# Patient Record
Sex: MALE | Race: WHITE | NOT HISPANIC OR LATINO | Employment: OTHER | URBAN - METROPOLITAN AREA
[De-identification: names, ages, dates, MRNs, and addresses within clinical notes are randomized per-mention and may not be internally consistent; named-entity substitution may affect disease eponyms.]

---

## 2020-03-05 ENCOUNTER — OFFICE VISIT (OUTPATIENT)
Dept: DERMATOLOGY | Facility: IMAGING CENTER | Age: 75
End: 2020-03-05
Payer: MEDICARE

## 2020-03-05 VITALS — TEMPERATURE: 98.2 F | BODY MASS INDEX: 28.89 KG/M2 | WEIGHT: 218 LBS | HEIGHT: 73 IN

## 2020-03-05 DIAGNOSIS — L57.0 ACTINIC KERATOSES: ICD-10-CM

## 2020-03-05 DIAGNOSIS — L30.9 DERMATITIS: ICD-10-CM

## 2020-03-05 PROCEDURE — 99203 OFFICE O/P NEW LOW 30 MIN: CPT | Performed by: DERMATOLOGY

## 2020-03-05 RX ORDER — HYDROCHLOROTHIAZIDE 25 MG/1
25 TABLET ORAL DAILY
COMMUNITY
End: 2023-10-23

## 2020-03-05 RX ORDER — TRIAMCINOLONE ACETONIDE 1 MG/G
OINTMENT TOPICAL
Qty: 454 G | Refills: 1 | Status: SHIPPED | OUTPATIENT
Start: 2020-03-05 | End: 2021-06-18

## 2020-03-05 RX ORDER — FLUOROURACIL 50 MG/G
CREAM TOPICAL
Qty: 1 TUBE | Refills: 1 | Status: SHIPPED | OUTPATIENT
Start: 2020-03-05 | End: 2021-08-02

## 2020-03-05 RX ORDER — TAMSULOSIN HYDROCHLORIDE 0.4 MG/1
0.4 CAPSULE ORAL 2 TIMES DAILY
COMMUNITY

## 2020-03-05 RX ORDER — CLOPIDOGREL BISULFATE 75 MG/1
75 TABLET ORAL DAILY
COMMUNITY
End: 2020-10-26

## 2020-03-05 RX ORDER — ATORVASTATIN CALCIUM 20 MG/1
80 TABLET, FILM COATED ORAL NIGHTLY
COMMUNITY
End: 2020-10-26 | Stop reason: SDUPTHER

## 2020-03-05 RX ORDER — CLOBETASOL PROPIONATE 0.5 MG/G
CREAM TOPICAL 2 TIMES DAILY
COMMUNITY
End: 2021-06-18

## 2020-03-05 RX ORDER — LIDOCAINE 50 MG/G
1 PATCH TOPICAL EVERY 24 HOURS
COMMUNITY
End: 2021-08-02

## 2020-03-05 RX ORDER — LORAZEPAM 1 MG/1
0.5 TABLET ORAL EVERY 4 HOURS PRN
COMMUNITY
End: 2021-08-02

## 2020-03-05 RX ORDER — NITROGLYCERIN 0.4 MG/1
0.4 TABLET SUBLINGUAL
COMMUNITY
End: 2020-10-26

## 2020-03-05 RX ORDER — PANTOPRAZOLE SODIUM 40 MG/1
40 TABLET, DELAYED RELEASE ORAL DAILY
COMMUNITY

## 2020-03-05 RX ORDER — ZOLPIDEM TARTRATE 5 MG/1
10 TABLET ORAL NIGHTLY PRN
COMMUNITY
End: 2021-08-02

## 2020-03-05 RX ORDER — AMLODIPINE BESYLATE AND ATORVASTATIN CALCIUM 10; 80 MG/1; MG/1
1 TABLET, FILM COATED ORAL NIGHTLY
COMMUNITY
End: 2020-10-26

## 2020-03-05 RX ORDER — LISINOPRIL 20 MG/1
20 TABLET ORAL DAILY
COMMUNITY
End: 2023-12-22

## 2020-03-05 RX ORDER — CETIRIZINE HYDROCHLORIDE 10 MG/1
10 TABLET ORAL DAILY
COMMUNITY
End: 2021-09-01

## 2020-03-05 RX ORDER — CYCLOBENZAPRINE HCL 10 MG
10 TABLET ORAL 3 TIMES DAILY PRN
COMMUNITY

## 2020-03-05 SDOH — HEALTH STABILITY: MENTAL HEALTH: HOW MANY STANDARD DRINKS CONTAINING ALCOHOL DO YOU HAVE ON A TYPICAL DAY?: 1 OR 2

## 2020-03-05 SDOH — HEALTH STABILITY: MENTAL HEALTH: HOW OFTEN DO YOU HAVE A DRINK CONTAINING ALCOHOL?: 2-3 TIMES A WEEK

## 2020-03-05 NOTE — PROGRESS NOTES
DERMATOLOGY NOTE  NEW VISIT       Chief complaint: Establish Care       Carl Blount is a 75 y.o. male who presents for   Wants scalp and head looked at.  Also has a rash.    HPI/location: LT neck  Time present: 6 weeks  Painful lesion: No  Itching lesion: No  Enlarging lesion: No  Anything make it better or worse?no    HPI:possible eczema on Arms and legs  Onset: almost 1 year  Symptoms: dry, itchy skin   Aggravating factors: no  Alleviating factors: dove soap  New creams/topicals: no  New medications (up to last 6 months): no  New travel: no  Other exposures: no  Treatments: Cerave and Eucerin     History of skin cancer: Yes, Details: scalp type unknown  History of precancers/actinic keratoses: Yes, Details: cryac  History of biopsies:Yes, Details: see above  History of blistering/severe sunburns:Yes, Details: several  Family history of skin cancer:No  Family history of atypical moles:No      History reviewed. No pertinent past medical history.     History reviewed. No pertinent family history.     Social History     Socioeconomic History   • Marital status:      Spouse name: Not on file   • Number of children: Not on file   • Years of education: Not on file   • Highest education level: Not on file   Occupational History   • Not on file   Social Needs   • Financial resource strain: Not on file   • Food insecurity     Worry: Not on file     Inability: Not on file   • Transportation needs     Medical: Not on file     Non-medical: Not on file   Tobacco Use   • Smoking status: Former Smoker   • Smokeless tobacco: Never Used   Substance and Sexual Activity   • Alcohol use: Yes     Frequency: 2-3 times a week     Drinks per session: 1 or 2   • Drug use: Not on file   • Sexual activity: Not on file   Lifestyle   • Physical activity     Days per week: Not on file     Minutes per session: Not on file   • Stress: Not on file   Relationships   • Social connections     Talks on phone: Not on file     Gets  "together: Not on file     Attends Shinto service: Not on file     Active member of club or organization: Not on file     Attends meetings of clubs or organizations: Not on file     Relationship status: Not on file   • Intimate partner violence     Fear of current or ex partner: Not on file     Emotionally abused: Not on file     Physically abused: Not on file     Forced sexual activity: Not on file   Other Topics Concern   • Not on file   Social History Narrative   • Not on file        No Known Allergies     MEDICATIONS:  Medications relevant to specialty reviewed.    Current Outpatient Medications:   •  triamcinolone acetonide (KENALOG) 0.1 % Ointment, Apply twice daily to affected areas of body until the skin is smooth. Repeat as needed. Do not use on face or genitals., Disp: 454 g, Rfl: 1  •  fluorouracil (EFUDEX) 5 % cream, Twice daily to area on the SCALP for 2-4 weeks until red and scabbed. AVOID eyes., Disp: 1 Tube, Rfl: 1     REVIEW OF SYSTEMS:   Positive for skin (see HPI)  Negative for fevers, chills and cough  All other systems reviewed and are negative.     EXAM:  Temp 36.8 °C (98.2 °F)   Ht 1.854 m (6' 1\")   Wt 98.9 kg (218 lb)   BMI 28.76 kg/m²   Constitutional: Well-developed, well-nourished, and in no distress.   HENT:   Head: normocephalic and atraumatic.  Right Ear: External ear normal.   Left Ear: External ear normal.   Nose: Nose normal.   Eyes: Conjunctivae and lids are normal.   Neck: Normal range of motion. Neck supple.   Pulmonary/Chest: Effort normal.   Neurological: Alert and oriented.    A focused cutaneous exam was completed including: scalp, hair, ears, face, eyelids, lips, neck, chest, abdomen, back  and left and right upper and lower extremities with the following pertinent findings listed below. Remaining above-listed examined areas within normal limits / negative for rashes or lesions.   - scalp, forehead, cheeks, ears, lt neck (submental) with pink gritty papules and diffuse " actinic damage  - bilateral arms, legs and lower back with ill defined pink scaly papules and thin plaques with underlying diffuse xerosis       IMPRESSION / PLAN:    1. Actinic keratoses  - Discussed precancerous nature of the condition, relationship to ultraviolet light exposure and need for safe sun practices and daily broad spectrum sun protection.   - Discussed treatment options including topical therapies, cryotherapy and photodynamic therapy.   - After discussing treatment options, patient wishes to pursue cryotherapy to thicker lesions today and then field treatment as below. Patient was advised that if actinic keratoses persist at one month after treatment, should come in for further evaluation and possible biopsy.     Procedure Note: Informed verbal consent including risk of redness, swelling, pain, blistering, scar, bleeding, infection and need for possible further treatment was obtained. The location(s) was/were identified by the patient and the physician. The lesion(s) was/were treated with cryotherapy for a total of 1 freeze/thaw cycles with the open spray technique. The patient tolerated the procedure well.  Wound care was discussed.   Location: as noted in exam  Total # of lesions treated:  14    - patient to start Efudex 5% cream to the AA of the SCALP BID x 2-3 weeks (pending response). AVOID eye area. Side effects of cream (significant irritation, erythema, scaling, itching, weeping, crusting, pain) discussed.   - Patient instructed to protect the skin from the sun.  - Aquaphor for itching/small open sores  - Patient was instructed to call me at any point if he has any concerns about proceeding with treatment or treatment response.    2. Dermatitis  - xerotic eczema appearing on exam today  - We reviewed dry skin care in detail, including short showers or baths with luke warm water, limited use of fragrance-free soap, generous use of bland fragrance-free emollients within 3 min of exiting the  shower or bath, and fragrance free laundry products.  - triamcinolone 0.1%  ointment BID to affected areas of the body until the skin is smooth.  - Application of topical steroid should be followed immediately by a thick layer of vaseline, hydrolatum, or aquaphor.  - We reviewed risks/benefits/expectations of treatment in detail, including side effects of long term topical steroid use (such as striae, atrophy and telangiectasias).       Return to clinic in: Return in about 3 months (around 6/5/2020) for Field tx f/u. and as needed for any new or changing skin lesions.    Marion Chapman M.D.

## 2020-03-16 ENCOUNTER — TELEPHONE (OUTPATIENT)
Dept: CARDIOLOGY | Facility: MEDICAL CENTER | Age: 75
End: 2020-03-16

## 2020-03-16 NOTE — TELEPHONE ENCOUNTER
Patient called to inform us of previous Cardio, was seen in [place]. Patient states they have records for previous care. Will request records additionally for care.

## 2020-07-07 ENCOUNTER — OFFICE VISIT (OUTPATIENT)
Dept: DERMATOLOGY | Facility: IMAGING CENTER | Age: 75
End: 2020-07-07
Payer: MEDICARE

## 2020-07-07 DIAGNOSIS — L57.0 ACTINIC KERATOSES: ICD-10-CM

## 2020-07-07 DIAGNOSIS — L30.9 DERMATITIS: ICD-10-CM

## 2020-07-07 DIAGNOSIS — D48.5 NEOPLASM OF UNCERTAIN BEHAVIOR OF SKIN: ICD-10-CM

## 2020-07-07 PROCEDURE — 17000 DESTRUCT PREMALG LESION: CPT | Mod: XS | Performed by: DERMATOLOGY

## 2020-07-07 PROCEDURE — 17003 DESTRUCT PREMALG LES 2-14: CPT | Performed by: DERMATOLOGY

## 2020-07-07 PROCEDURE — 11102 TANGNTL BX SKIN SINGLE LES: CPT | Performed by: DERMATOLOGY

## 2020-07-07 PROCEDURE — 99213 OFFICE O/P EST LOW 20 MIN: CPT | Mod: 25 | Performed by: DERMATOLOGY

## 2020-07-07 NOTE — PROGRESS NOTES
DERMATOLOGY NOTE  FOLLOW UP VISIT       Chief complaint: Actinic Keratosis       Carl Blount is a 75 y.o. male who presents for follow up management of AKs.  AK treatment, Used Efudex on scalp once a week because didn't remember the instructions.  Dermatitis on legs gets better with Triamcinolone use x 1 day but always getting new spots.     History of skin cancer: Yes, Details: scalp type unknown  History of precancers/actinic keratoses: Yes, Details: cryac  History of biopsies:Yes, Details: see above  History of blistering/severe sunburns:Yes, Details: several  Family history of skin cancer:No  Family history of atypical moles:No      Past Medical History:   Diagnosis Date   • Hyperlipidemia    • Hypertension         History reviewed. No pertinent family history.     Social History     Socioeconomic History   • Marital status:      Spouse name: Not on file   • Number of children: Not on file   • Years of education: Not on file   • Highest education level: Not on file   Occupational History   • Not on file   Social Needs   • Financial resource strain: Not on file   • Food insecurity     Worry: Not on file     Inability: Not on file   • Transportation needs     Medical: Not on file     Non-medical: Not on file   Tobacco Use   • Smoking status: Former Smoker   • Smokeless tobacco: Never Used   Substance and Sexual Activity   • Alcohol use: Yes     Frequency: 2-3 times a week     Drinks per session: 1 or 2   • Drug use: Not on file   • Sexual activity: Not on file   Lifestyle   • Physical activity     Days per week: Not on file     Minutes per session: Not on file   • Stress: Not on file   Relationships   • Social connections     Talks on phone: Not on file     Gets together: Not on file     Attends Sikhism service: Not on file     Active member of club or organization: Not on file     Attends meetings of clubs or organizations: Not on file     Relationship status: Not on file   • Intimate partner  violence     Fear of current or ex partner: Not on file     Emotionally abused: Not on file     Physically abused: Not on file     Forced sexual activity: Not on file   Other Topics Concern   • Not on file   Social History Narrative   • Not on file        No Known Allergies     MEDICATIONS:  Medications relevant to specialty reviewed.    Current Outpatient Medications:   •  triamcinolone acetonide (KENALOG) 0.1 % Ointment, Apply twice daily to affected areas of body until the skin is smooth. Repeat as needed. Do not use on face or genitals., Disp: 454 g, Rfl: 1  •  fluorouracil (EFUDEX) 5 % cream, Twice daily to area on the SCALP for 2-4 weeks until red and scabbed. AVOID eyes., Disp: 1 Tube, Rfl: 1  •  amlodipine-atorvastatatin (CADUET) 10-80 MG per tablet, Take 1 Tab by mouth every evening., Disp: , Rfl:   •  atorvastatin (LIPITOR) 20 MG Tab, Take 80 mg by mouth every evening., Disp: , Rfl:   •  cetirizine (ZYRTEC) 10 MG Tab, Take 10 mg by mouth every day., Disp: , Rfl:   •  clopidogrel (PLAVIX) 75 MG Tab, Take 75 mg by mouth every day., Disp: , Rfl:   •  cyclobenzaprine (FLEXERIL) 10 MG Tab, Take 10 mg by mouth 3 times a day as needed., Disp: , Rfl:   •  hydroCHLOROthiazide (HYDRODIURIL) 25 MG Tab, Take 25 mg by mouth every day., Disp: , Rfl:   •  clobetasol (TEMOVATE) 0.05 % Cream, Apply  to affected area(s) 2 times a day., Disp: , Rfl:   •  lidocaine (LIDODERM) 5 % Patch, Apply 1 Patch to skin as directed every 24 hours., Disp: , Rfl:   •  lisinopril (PRINIVIL) 20 MG Tab, Take 20 mg by mouth every day., Disp: , Rfl:   •  LORazepam (ATIVAN) 1 MG Tab, Take 0.5 mg by mouth every four hours as needed for Anxiety., Disp: , Rfl:   •  pantoprazole (PROTONIX) 40 MG Tablet Delayed Response, Take 40 mg by mouth every day., Disp: , Rfl:   •  nitroglycerin (NITROSTAT) 0.4 MG SL Tab, Place 0.4 mg under tongue every 5 minutes as needed for Chest Pain., Disp: , Rfl:   •  rivaroxaban (XARELTO) 20 MG Tab tablet, Take 20 mg by  mouth with dinner., Disp: , Rfl:   •  tamsulosin (FLOMAX) 0.4 MG capsule, Take 0.4 mg by mouth ONE-HALF HOUR AFTER BREAKFAST., Disp: , Rfl:   •  zolpidem (AMBIEN) 5 MG Tab, Take 5 mg by mouth at bedtime as needed for Sleep., Disp: , Rfl:      REVIEW OF SYSTEMS:   Positive for skin (see HPI)  Negative for fevers, chills and cough     EXAM:  There were no vitals taken for this visit.  Constitutional: Well-developed, well-nourished, and in no distress.   HENT: Head normocephalic and atraumatic.   Neurological: Alert and oriented.    A focused skin exam was performed including the affected areas of the head (including face), neck and bilateral upper and lower extremities. Notable findings on exam today listed below. Remaining above-listed examined areas within normal limits / negative for rashes or lesions.  - left hand with 0.9 cm pink hyperkeratotic papule  - scalp, forehead with pink gritty papules and diffuse actinic damage  - bilateral arms, legs with ill defined pink scaly papules and thin plaques with underlying diffuse xerosis    IMPRESSION / PLAN:    1. Actinic keratoses  - Discussed precancerous nature of the condition, relationship to ultraviolet light exposure and need for safe sun practices and daily broad spectrum sun protection.   - Discussed treatment options including topical therapies, cryotherapy and photodynamic therapy.   - After discussing treatment options, patient wishes to pursue cryotherapy to thicker lesions today and then field tx as below. Patient was advised that if actinic keratoses persist at one month after treatment, should come in for further evaluation and possible biopsy.     Procedure Note: Informed verbal consent including risk of redness, swelling, pain, blistering, scar, bleeding, infection and need for possible further treatment was obtained. The location(s) was/were identified by the patient and the physician. The lesion(s) was/were treated with cryotherapy for a total of 1  freeze/thaw cycles with the open spray technique. The patient tolerated the procedure well.  Wound care was discussed.   Location: as noted in exam  Total # of lesions treated:  5    - patient to start Efudex 5% cream to the AA of the SCALP BID x 2-3 weeks (pending response). AVOID eye area. Side effects of cream (significant irritation, erythema, scaling, itching, weeping, crusting, pain) discussed.   - Patient instructed to protect the skin from the sun.  - Aquaphor for itching/small open sores  - Patient was instructed to call me at any point if he has any concerns about proceeding with treatment or treatment response.    2. Dermatitis  - xerotic eczema appearing on exam today  - We reviewed dry skin care in detail, including short showers or baths with luke warm water, limited use of fragrance-free soap, generous use of bland fragrance-free emollients within 3 min of exiting the shower or bath, and fragrance free laundry products.  - cont triamcinolone 0.1%  ointment BID to affected areas of the body until the skin is smooth.  - Application of topical steroid should be followed immediately by a thick layer of vaseline, hydrolatum, or aquaphor.  - We reviewed risks/benefits/expectations of treatment in detail, including side effects of long term topical steroid use (such as striae, atrophy and telangiectasias).    3. Neoplasm of uncertain behavior of skin  - Discussed monitoring vs biopsy, patient agreeable to biopsy today, see procedure note below  - Biopsy Procedure Note: biopsy by shave technique  - Location: left hand  - Size: as noted in exam  - Total specimens sent for pathology: 1  - Photo taken with patient permission (see media tab)  - Preoperative diagnosis: r/o scc vs hak  - Plan if positive for scc then WLE    Shave bx x1   Risks, benefits and alternatives of procedure discussed, verbal consent obtained for photo and written informed consent obtained for procedure. Time out completed. Area of biopsy  prepped with alcohol. Anesthesia with 1% lidocaine with epinephrine administered with 30 gauge needle. Shave biopsy of the site performed. Hemostasis achieved with aluminum chloride. Vaseline applied to wound with bandage. Patient tolerated procedure well and there were no complications. The specimen was sent to the pathology lab by the staff. Wound care was discussed.       Return to clinic in: Return 3 mo for field tx follow up. and as needed for any new or changing skin lesions.    Marion Chapman M.D.

## 2020-07-13 ENCOUNTER — TELEPHONE (OUTPATIENT)
Dept: DERMATOLOGY | Facility: IMAGING CENTER | Age: 75
End: 2020-07-13

## 2020-07-13 NOTE — TELEPHONE ENCOUNTER
Called patient. Informed that pathology was consistent with a precancerous lesion (AK). Can treat with cryo at 3 mo follow up or if patient prefers may treat with Efudex at home while treating scalp and will re-eval at 3 mo follow up.     See media tab for path report (D-path).

## 2020-07-20 ENCOUNTER — TELEPHONE (OUTPATIENT)
Dept: CARDIOLOGY | Facility: MEDICAL CENTER | Age: 75
End: 2020-07-20

## 2020-07-20 NOTE — TELEPHONE ENCOUNTER
Received clearance request from [facility] for pt's EGD, procedure date TBD. They would also like clearance for pt to hold his OAC. Pt has appt to establish with  VR on 7/24. Will address at that time. Added to appt notes.

## 2020-07-21 ENCOUNTER — TELEPHONE (OUTPATIENT)
Dept: CARDIOLOGY | Facility: MEDICAL CENTER | Age: 75
End: 2020-07-21

## 2020-07-21 NOTE — TELEPHONE ENCOUNTER
Called patient to see if he has undergone any cardiac testing recently to get records prior to new patient appt with [facility]. Unable to reach patient, left voicemail for call back.    Of note, records from [facility] (Dr. Maurer, cardiologist) are available through [facility].

## 2020-07-24 ENCOUNTER — OFFICE VISIT (OUTPATIENT)
Dept: CARDIOLOGY | Facility: MEDICAL CENTER | Age: 75
End: 2020-07-24
Payer: MEDICARE

## 2020-07-24 VITALS
BODY MASS INDEX: 30.39 KG/M2 | OXYGEN SATURATION: 95 % | DIASTOLIC BLOOD PRESSURE: 72 MMHG | RESPIRATION RATE: 16 BRPM | SYSTOLIC BLOOD PRESSURE: 140 MMHG | HEART RATE: 88 BPM | HEIGHT: 73 IN | WEIGHT: 229.28 LBS

## 2020-07-24 DIAGNOSIS — Z01.810 PREOPERATIVE CARDIOVASCULAR EXAMINATION: ICD-10-CM

## 2020-07-24 DIAGNOSIS — E78.2 MIXED HYPERLIPIDEMIA: ICD-10-CM

## 2020-07-24 DIAGNOSIS — Z98.890 HISTORY OF CAROTID ENDARTERECTOMY: ICD-10-CM

## 2020-07-24 DIAGNOSIS — I65.23 BILATERAL CAROTID ARTERY STENOSIS: ICD-10-CM

## 2020-07-24 DIAGNOSIS — I25.10 CORONARY ARTERY DISEASE INVOLVING NATIVE HEART WITHOUT ANGINA PECTORIS, UNSPECIFIED VESSEL OR LESION TYPE: ICD-10-CM

## 2020-07-24 DIAGNOSIS — I77.72 ILIAC DISSECTION (HCC): ICD-10-CM

## 2020-07-24 DIAGNOSIS — I10 ESSENTIAL HYPERTENSION: ICD-10-CM

## 2020-07-24 DIAGNOSIS — Z86.73 HISTORY OF CARDIOEMBOLIC CEREBROVASCULAR ACCIDENT (CVA): ICD-10-CM

## 2020-07-24 LAB — EKG IMPRESSION: NORMAL

## 2020-07-24 PROCEDURE — 93000 ELECTROCARDIOGRAM COMPLETE: CPT | Performed by: INTERNAL MEDICINE

## 2020-07-24 PROCEDURE — 99205 OFFICE O/P NEW HI 60 MIN: CPT | Performed by: INTERNAL MEDICINE

## 2020-07-24 RX ORDER — METOPROLOL SUCCINATE 25 MG/1
25 TABLET, EXTENDED RELEASE ORAL DAILY
Qty: 90 TAB | Refills: 3 | Status: SHIPPED | OUTPATIENT
Start: 2020-07-24 | End: 2021-02-22 | Stop reason: SDUPTHER

## 2020-07-24 ASSESSMENT — ENCOUNTER SYMPTOMS
NEAR-SYNCOPE: 0
WEIGHT GAIN: 0
ORTHOPNEA: 0
COUGH: 0
ABDOMINAL PAIN: 0
DIZZINESS: 0
CONSTIPATION: 0
BLURRED VISION: 0
HEARTBURN: 0
PND: 0
DECREASED APPETITE: 0
FEVER: 0
SYNCOPE: 0
CLAUDICATION: 0
IRREGULAR HEARTBEAT: 0
BACK PAIN: 0
DYSPNEA ON EXERTION: 0
FLANK PAIN: 0
NAUSEA: 0
DEPRESSION: 0
DIARRHEA: 0
WEIGHT LOSS: 0
SHORTNESS OF BREATH: 0
ALTERED MENTAL STATUS: 0
PALPITATIONS: 0
VOMITING: 0

## 2020-07-24 NOTE — TELEPHONE ENCOUNTER
Per VR, pt explained to him that he hasn't even seen [facility] and he's not sure if he'll even need EGD. VR recommends waiting until pt sees [facility]. If they do decide to proceed, pt will need bridging to hold Xarelto.

## 2020-07-24 NOTE — PATIENT INSTRUCTIONS
Metoprolol extended-release capsules  What is this medicine?  METOPROLOL (me TOE proe lole) is a beta-blocker. Beta-blockers reduce the workload on the heart and help it to beat more regularly. This medicine is used to treat high blood pressure and to prevent chest pain. It is also used after a heart attack to prevent an additional heart attack from occurring.  This medicine may be used for other purposes; ask your health care provider or pharmacist if you have questions.  COMMON BRAND NAME(S): SCOTT  What should I tell my health care provider before I take this medicine?  They need to know if you have any of these conditions:  · diabetes  · heart disease  · liver disease  · lung or breathing disease, like asthma  · pheochromocytoma  · thyroid disease  · an unusual or allergic reaction to metoprolol, other beta-blockers, medicines, foods, dyes, or preservatives  · pregnant or trying to get pregnant  · breast-feeding  How should I use this medicine?  Take this medicine by mouth. The capsules can be swallowed whole or opened carefully and the contents sprinkled over a small amount (teaspoonful) of soft food, such as applesauce, pudding, or yogurt. This mixture must be swallowed within 60 minutes and not stored for future use. Do not chew this medicine. You can take it with or without food. If it upsets your stomach, take it with food. Take your medicine at regular intervals. Do not take it more often than directed. Do not stop taking except on your doctor's advice.  Talk to your pediatrician regarding the use of this medicine in children. While this drug may be prescribed for children as young as 6 for selected conditions, precautions do apply.  Overdosage: If you think you have taken too much of this medicine contact a poison control center or emergency room at once.  NOTE: This medicine is only for you. Do not share this medicine with others.  What if I miss a dose?  If you miss a dose, take it as soon as you  can. If it is almost time for your next dose, take only that dose. Do not take double or extra doses.  What may interact with this medicine?  This medicine may interact with the following medications:  · certain medicines for blood pressure, heart disease, irregular heart beat  · epinephrine  · fluoxetine  · MAOIs like Carbex, Eldepryl, Marplan, Nardil, and Parnate  · paroxetine  · reserpine  This list may not describe all possible interactions. Give your health care provider a list of all the medicines, herbs, non-prescription drugs, or dietary supplements you use. Also tell them if you smoke, drink alcohol, or use illegal drugs. Some items may interact with your medicine.  What should I watch for while using this medicine?  You may get drowsy or dizzy. Do not drive, use machinery, or do anything that needs mental alertness until you know how this medicine affects you. Do not stand or sit up quickly, especially if you are an older patient. This reduces the risk of dizzy or fainting spells. Alcohol may interfere with the effect of this medicine. Avoid alcoholic drinks.  Visit your doctor or health care professional for regular checks on your progress. Check your blood pressure as directed. Ask your doctor or health care professional what your blood pressure should be and when you should contact him or her.  Do not treat yourself for coughs, colds, or pain while you are using this medicine without asking your doctor or health care professional for advice. Some ingredients may increase your blood pressure.  This medicine may increase blood sugar. Ask your healthcare provider if changes in diet or medicines are needed if you have diabetes.  What side effects may I notice from receiving this medicine?  Side effects that you should report to your doctor or health care professional as soon as possible:  · allergic reactions like skin rash, itching or hives, swelling of the face, lips, or tongue  · cold hands or  feet  · signs and symptoms of high blood sugar such as being more thirsty or hungry or having to urinate more than normal. You may also feel very tired or have blurry vision.  · signs and symptoms of low blood pressure like dizziness; feeling faint or lightheaded, falls; unusually weak or tired  · signs of worsening heart failure like breathing problems, swelling in your legs and feet  · suicidal thoughts or other mood changes  · unusually slow heartbeat  Side effects that usually do not require medical attention (report these to your doctor or health care professional if they continue or are bothersome):  · anxious  · change in sex drive or performance  · diarrhea  · headache  · trouble sleeping  · upset stomach  This list may not describe all possible side effects. Call your doctor for medical advice about side effects. You may report side effects to FDA at 0-063-FDA-2474.  Where should I keep my medicine?  Keep out of the reach of children.  Store at room temperature between 15 and 30 degrees C (59 and 86 degrees F). Throw away any unused medicine after the expiration date.  NOTE: This sheet is a summary. It may not cover all possible information. If you have questions about this medicine, talk to your doctor, pharmacist, or health care provider.  © 2020 [employer] (2019-10-08 11:07:10)

## 2020-07-24 NOTE — PROGRESS NOTES
Cardiology Note    Chief Complaint   Patient presents with   • Hypertension     New patient        History of Present Illness: Carl Blount is a 75 y.o. male PMH CAD/PCI LONG RCA 4/2018, L CEA c/b dissection chronic, CVA ischemic multiple, paroxysmal atrial fibrillation found on loop recorder, HLD, HTN, chronic R iliac dissection who presents to establish care after moving to [place].     No cardiac complaints this visit. Active with walking. Compliant with medications and denies adverse effects. He is pending GI scopes and wanting aid in managing anticoagulation. In the past has used lovenox to bridge doac to procedure.     Review of Systems   Constitution: Negative for decreased appetite, fever, malaise/fatigue, weight gain and weight loss.   HENT: Negative for congestion and nosebleeds.    Eyes: Negative for blurred vision.   Cardiovascular: Negative for chest pain, claudication, dyspnea on exertion, irregular heartbeat, leg swelling, near-syncope, orthopnea, palpitations, paroxysmal nocturnal dyspnea and syncope.   Respiratory: Negative for cough and shortness of breath.    Endocrine: Negative for cold intolerance and heat intolerance.   Skin: Negative for rash.   Musculoskeletal: Negative for back pain.   Gastrointestinal: Negative for abdominal pain, constipation, diarrhea, heartburn, melena, nausea and vomiting.   Genitourinary: Negative for dysuria, flank pain and hematuria.   Neurological: Negative for dizziness.   Psychiatric/Behavioral: Negative for altered mental status and depression.         Past Medical History:   Diagnosis Date   • Hyperlipidemia    • Hypertension          History reviewed. No pertinent surgical history.      Current Outpatient Medications   Medication Sig Dispense Refill   • metoprolol SR (TOPROL XL) 25 MG TABLET SR 24 HR Take 1 Tab by mouth every day. 90 Tab 3   • triamcinolone acetonide (KENALOG) 0.1 % Ointment Apply twice daily to affected areas of body until the skin is  smooth. Repeat as needed. Do not use on face or genitals. 454 g 1   • fluorouracil (EFUDEX) 5 % cream Twice daily to area on the SCALP for 2-4 weeks until red and scabbed. AVOID eyes. 1 Tube 1   • amlodipine-atorvastatatin (CADUET) 10-80 MG per tablet Take 1 Tab by mouth every evening.     • atorvastatin (LIPITOR) 20 MG Tab Take 80 mg by mouth every evening.     • cetirizine (ZYRTEC) 10 MG Tab Take 10 mg by mouth every day.     • clopidogrel (PLAVIX) 75 MG Tab Take 75 mg by mouth every day.     • cyclobenzaprine (FLEXERIL) 10 MG Tab Take 10 mg by mouth 3 times a day as needed.     • hydroCHLOROthiazide (HYDRODIURIL) 25 MG Tab Take 25 mg by mouth every day.     • clobetasol (TEMOVATE) 0.05 % Cream Apply  to affected area(s) 2 times a day.     • lidocaine (LIDODERM) 5 % Patch Apply 1 Patch to skin as directed every 24 hours.     • lisinopril (PRINIVIL) 20 MG Tab Take 20 mg by mouth every day.     • LORazepam (ATIVAN) 1 MG Tab Take 0.5 mg by mouth every four hours as needed for Anxiety.     • rivaroxaban (XARELTO) 20 MG Tab tablet Take 20 mg by mouth with dinner.     • tamsulosin (FLOMAX) 0.4 MG capsule Take 0.4 mg by mouth ONE-HALF HOUR AFTER BREAKFAST.     • zolpidem (AMBIEN) 5 MG Tab Take 5 mg by mouth at bedtime as needed for Sleep.     • pantoprazole (PROTONIX) 40 MG Tablet Delayed Response Take 40 mg by mouth every day.     • nitroglycerin (NITROSTAT) 0.4 MG SL Tab Place 0.4 mg under tongue every 5 minutes as needed for Chest Pain.       No current facility-administered medications for this visit.          No Known Allergies      History reviewed. No pertinent family history.      Social History     Socioeconomic History   • Marital status:      Spouse name: Not on file   • Number of children: Not on file   • Years of education: Not on file   • Highest education level: Not on file   Occupational History   • Not on file   Social Needs   • Financial resource strain: Not on file   • Food insecurity     Worry:  "Not on file     Inability: Not on file   • Transportation needs     Medical: Not on file     Non-medical: Not on file   Tobacco Use   • Smoking status: Former Smoker   • Smokeless tobacco: Never Used   Substance and Sexual Activity   • Alcohol use: Yes     Frequency: 2-3 times a week     Drinks per session: 1 or 2   • Drug use: Not on file   • Sexual activity: Not on file   Lifestyle   • Physical activity     Days per week: Not on file     Minutes per session: Not on file   • Stress: Not on file   Relationships   • Social connections     Talks on phone: Not on file     Gets together: Not on file     Attends [employer] service: Not on file     Active member of club or organization: Not on file     Attends meetings of clubs or organizations: Not on file     Relationship status: Not on file   • Intimate partner violence     Fear of current or ex partner: Not on file     Emotionally abused: Not on file     Physically abused: Not on file     Forced sexual activity: Not on file   Other Topics Concern   • Not on file   Social History Narrative   • Not on file         Physical Exam:  Ambulatory Vitals  /72 (BP Location: Left arm, Patient Position: Sitting, BP Cuff Size: Adult)   Pulse 88   Resp 16   Ht 1.854 m (6' 1\")   Wt 104 kg (229 lb 4.5 oz)   SpO2 95%    BP Readings from Last 4 Encounters:   07/24/20 140/72     Weight/BMI:   Vitals:    07/24/20 1416   BP: 140/72   Weight: 104 kg (229 lb 4.5 oz)   Height: 1.854 m (6' 1\")    Body mass index is 30.25 kg/m².  Wt Readings from Last 4 Encounters:   07/24/20 104 kg (229 lb 4.5 oz)   03/05/20 98.9 kg (218 lb)       Physical Exam   Constitutional: He is oriented to person, place, and time and well-developed, well-nourished, and in no distress. No distress.   HENT:   Head: Normocephalic and atraumatic.   Eyes: Pupils are equal, round, and reactive to light. Conjunctivae are normal.   Neck: Normal range of motion. Neck supple. No JVD present.   Cardiovascular: Normal " rate, regular rhythm, normal heart sounds and intact distal pulses. Exam reveals no gallop and no friction rub.   No murmur heard.  Pulmonary/Chest: Effort normal and breath sounds normal. No respiratory distress. He has no wheezes. He has no rales. He exhibits no tenderness.   Abdominal: Soft. Bowel sounds are normal. He exhibits no distension.   Musculoskeletal:         General: No edema.   Neurological: He is alert and oriented to person, place, and time.   Skin: Skin is warm and dry.   Psychiatric: Affect and judgment normal.       Lab Data Review:  No results found for: CHOLSTRLTOT, LDL, HDL, TRIGLYCERIDE    No results found for: SODIUM, POTASSIUM, CHLORIDE, CO2, GLUCOSE, BUN, CREATININE, BUNCREATRAT, GLOMRATE  CrCl cannot be calculated (No successful lab value found.).  No results found for: ALKPHOSPHAT, ASTSGOT, ALTSGPT, TBILIRUBIN   No results found for: WBC, HCT  No results found for: HBA1C  No components found for: TROP    Outside labs 06/2020  Tot chol 128, trig 40, HDL 57, LDL 63  U/a wnl, a1c 5.7, cbc wnl, bmp wnl, micro alb / creat <3.6 mg/g wnl, TSH wnl    Cardiac Imaging and Procedures Review:      [facility] 2/2018 outside study  1. Quality of study: good.  2. There s is TID.  3. The LV volumes are enlarged..  4. SPECT images demonstrate infarct of the inferolateral wall with minor ama-infarct ischemia.  5. The gate stress LVEF is 53%    [facility] 04/2018 outside study  Procedure Performed:  1. R femoral artery access  2. Left heart catheterization with selective coronary angiography  3. PCI ostial RCA - XIENCE 2.25 x 12mm post dilated NC trek 2.5 x 12 to 20 narciso  4. Perclose R CFA  Summary of Findings:  1. 99.9% ostial RCA  2. Left main no disease  3. LAD minimal disease  4. LCx no disease  5. LVEDP 8mmHg  6. Chronic dissection R external iliac artery dual lumen    Carotid artery ultrasound 2013 [facility] Shanks outside study  1.  Compared to carotid arterial ultrasonography performed 2/15/2012-     a)   Left carotid  arterial bifurcation has returned to normal  appearance status post endarterectomy.     b)   Mild-moderate degree right carotid bulb and proximal right  internal carotid arterial plaque without evidence of hemodynamically  significant disease is present and not substantially changed.    c)  There remains normal antegrade flow within the cervical right  and left vertebral arteries.    CTA neck s/p CEA 2012  1. Postoperative change compatible with interval left carotid  endarterectomy. The left common and internal carotid arteries appear  patent along their course.  2. Focal dissection flap is present within the proximal left external  carotid artery, without evidence of distal occlusion.  3. Stable atherosclerotic disease and stenosis of the right common  and internal carotid arteries, as described above.  4. Stable atherosclerotic disease of the origin of the right  vertebral artery.    TTE 2012  Interpretation Summary  1.  Negative contrast study for interatrial shunt. 2.    Mild LVH with normal LV size and systolic function. No   significant valvular abnormality noted. 3.  No prior   study for comparison.    Medical Decision Making:  Problem List Items Addressed This Visit     Essential hypertension    Relevant Medications    metoprolol SR (TOPROL XL) 25 MG TABLET SR 24 HR    Other Relevant Orders    EKG (Completed)    Bilateral carotid artery stenosis    Relevant Medications    metoprolol SR (TOPROL XL) 25 MG TABLET SR 24 HR    Other Relevant Orders    US-CAROTID DOPPLER BILAT    Coronary artery disease involving native heart without angina pectoris    Relevant Medications    metoprolol SR (TOPROL XL) 25 MG TABLET SR 24 HR    History of cardioembolic cerebrovascular accident (CVA)    Preoperative cardiovascular examination    Mixed hyperlipidemia    Relevant Medications    metoprolol SR (TOPROL XL) 25 MG TABLET SR 24 HR    History of carotid endarterectomy    Iliac dissection (HCC) history    Relevant Medications     metoprolol SR (TOPROL XL) 25 MG TABLET SR 24 HR        CAD / HLD / hx CVA - asymptomatic. Would like to d/c plavix and simply continue on daoc. Readdress next visit, patient not sure yet. Continue statin. LDL at goal <70.    Paroxysmal AF - chadsvasc 6 - continue xarelto for cva prevention. Added metoprolol for rate control. He will need bridging with lovenox for procedures; 1mg/kg bid or option 1.5mg/kg daily. Although, patient not sure if needs screening GI scopes as he is now 75 and he will further discuss with GI.    HTN - elevated. Goal 120/80. He will check at home. Did add metop for now and will reassess. Titrate acei next visit.    Carotid / iliac chronic dissections - serial testing.     It was my pleasure to meet with Mr. Blount.    50 min spent reviewing outside records.

## 2020-07-29 ENCOUNTER — HOSPITAL ENCOUNTER (OUTPATIENT)
Dept: RADIOLOGY | Facility: MEDICAL CENTER | Age: 75
End: 2020-07-29
Attending: INTERNAL MEDICINE
Payer: MEDICARE

## 2020-07-29 DIAGNOSIS — I65.23 BILATERAL CAROTID ARTERY STENOSIS: ICD-10-CM

## 2020-07-29 PROCEDURE — 93880 EXTRACRANIAL BILAT STUDY: CPT

## 2020-07-29 PROCEDURE — 93880 EXTRACRANIAL BILAT STUDY: CPT | Mod: 26 | Performed by: INTERNAL MEDICINE

## 2020-08-05 ENCOUNTER — OFFICE VISIT (OUTPATIENT)
Dept: DERMATOLOGY | Facility: IMAGING CENTER | Age: 75
End: 2020-08-05
Payer: MEDICARE

## 2020-08-05 DIAGNOSIS — L57.0 ACTINIC KERATOSES: ICD-10-CM

## 2020-08-05 PROCEDURE — 99213 OFFICE O/P EST LOW 20 MIN: CPT | Mod: 25 | Performed by: DERMATOLOGY

## 2020-08-05 PROCEDURE — 17004 DESTROY PREMAL LESIONS 15/>: CPT | Performed by: DERMATOLOGY

## 2020-08-05 NOTE — PROGRESS NOTES
DERMATOLOGY NOTE  FOLLOW UP VISIT       Chief complaint: Follow-Up, Actinic Keratosis, and Skin Lesion     Completed 2 week course of efudex to scalp x 1 week ago. Doing well. Feels it cleared up a lot of rough spots. Also has some lesions of concern.  HPI:  3 skin lesions on left temple ,  Left jaw gabriela and right jaw line .   All non healing scaly , rough   Time present: 2-3 months   Painful lesion: No  Itching lesion: No  Enlarging lesion: No  Anything make it better or worse?no     History of skin cancer: Yes, Details: scalp type unknown  History of precancers/actinic keratoses: Yes, Details: cryac  History of biopsies:Yes, Details: see above  History of blistering/severe sunburns:Yes, Details: several  Family history of skin cancer:No  Family history of atypical moles:No      Past Medical History:   Diagnosis Date   • Hyperlipidemia    • Hypertension         History reviewed. No pertinent family history.     Social History     Socioeconomic History   • Marital status:      Spouse name: Not on file   • Number of children: Not on file   • Years of education: Not on file   • Highest education level: Not on file   Occupational History   • Not on file   Social Needs   • Financial resource strain: Not on file   • Food insecurity     Worry: Not on file     Inability: Not on file   • Transportation needs     Medical: Not on file     Non-medical: Not on file   Tobacco Use   • Smoking status: Former Smoker   • Smokeless tobacco: Never Used   Substance and Sexual Activity   • Alcohol use: Yes     Frequency: 2-3 times a week     Drinks per session: 1 or 2   • Drug use: Not on file   • Sexual activity: Not on file   Lifestyle   • Physical activity     Days per week: Not on file     Minutes per session: Not on file   • Stress: Not on file   Relationships   • Social connections     Talks on phone: Not on file     Gets together: Not on file     Attends [employer] service: Not on file     Active member of club or  organization: Not on file     Attends meetings of clubs or organizations: Not on file     Relationship status: Not on file   • Intimate partner violence     Fear of current or ex partner: Not on file     Emotionally abused: Not on file     Physically abused: Not on file     Forced sexual activity: Not on file   Other Topics Concern   • Not on file   Social History Narrative   • Not on file        No Known Allergies     MEDICATIONS:  Medications relevant to specialty reviewed.    Current Outpatient Medications:   •  metoprolol SR (TOPROL XL) 25 MG TABLET SR 24 HR, Take 1 Tab by mouth every day., Disp: 90 Tab, Rfl: 3  •  triamcinolone acetonide (KENALOG) 0.1 % Ointment, Apply twice daily to affected areas of body until the skin is smooth. Repeat as needed. Do not use on face or genitals., Disp: 454 g, Rfl: 1  •  fluorouracil (EFUDEX) 5 % cream, Twice daily to area on the SCALP for 2-4 weeks until red and scabbed. AVOID eyes., Disp: 1 Tube, Rfl: 1  •  amlodipine-atorvastatatin (CADUET) 10-80 MG per tablet, Take 1 Tab by mouth every evening., Disp: , Rfl:   •  atorvastatin (LIPITOR) 20 MG Tab, Take 80 mg by mouth every evening., Disp: , Rfl:   •  cetirizine (ZYRTEC) 10 MG Tab, Take 10 mg by mouth every day., Disp: , Rfl:   •  clopidogrel (PLAVIX) 75 MG Tab, Take 75 mg by mouth every day., Disp: , Rfl:   •  cyclobenzaprine (FLEXERIL) 10 MG Tab, Take 10 mg by mouth 3 times a day as needed., Disp: , Rfl:   •  hydroCHLOROthiazide (HYDRODIURIL) 25 MG Tab, Take 25 mg by mouth every day., Disp: , Rfl:   •  clobetasol (TEMOVATE) 0.05 % Cream, Apply  to affected area(s) 2 times a day., Disp: , Rfl:   •  lidocaine (LIDODERM) 5 % Patch, Apply 1 Patch to skin as directed every 24 hours., Disp: , Rfl:   •  lisinopril (PRINIVIL) 20 MG Tab, Take 20 mg by mouth every day., Disp: , Rfl:   •  LORazepam (ATIVAN) 1 MG Tab, Take 0.5 mg by mouth every four hours as needed for Anxiety., Disp: , Rfl:   •  pantoprazole (PROTONIX) 40 MG Tablet  Delayed Response, Take 40 mg by mouth every day., Disp: , Rfl:   •  nitroglycerin (NITROSTAT) 0.4 MG SL Tab, Place 0.4 mg under tongue every 5 minutes as needed for Chest Pain., Disp: , Rfl:   •  rivaroxaban (XARELTO) 20 MG Tab tablet, Take 20 mg by mouth with dinner., Disp: , Rfl:   •  tamsulosin (FLOMAX) 0.4 MG capsule, Take 0.4 mg by mouth ONE-HALF HOUR AFTER BREAKFAST., Disp: , Rfl:   •  zolpidem (AMBIEN) 5 MG Tab, Take 5 mg by mouth at bedtime as needed for Sleep., Disp: , Rfl:      REVIEW OF SYSTEMS:   Positive for skin (see HPI)  Negative for fevers, chills and cough     EXAM:  There were no vitals taken for this visit.  Constitutional: Well-developed, well-nourished, and in no distress.   HENT: Head normocephalic and atraumatic.   Neurological: Alert and oriented.    A focused skin exam was performed including the affected areas of the head (including face), neck and bilateral upper extremities. Notable findings on exam today listed below. Remaining above-listed examined areas within normal limits / negative for rashes or lesions.    - left hand with healing biopsy site (still with small scab)  - scalp with scattered erythematous papules and some areas of mild crusting  - b/l temples and cheeks, jawline, forehead with with pink gritty papules and diffuse underlying actinic damage    IMPRESSION / PLAN:    1. Actinic keratoses  - Discussed precancerous nature of the condition, relationship to ultraviolet light exposure and need for safe sun practices and daily broad spectrum sun protection.   - Discussed treatment options including topical therapies, cryotherapy and photodynamic therapy.   - After discussing treatment options, patient wishes to pursue cryotherapy to thicker lesions today and then field tx as below. Patient was advised that if actinic keratoses persist at one month after treatment, should come in for further evaluation and possible biopsy.     Procedure Note: Informed verbal consent including  risk of redness, swelling, pain, blistering, scar, bleeding, infection and need for possible further treatment was obtained. The location(s) was/were identified by the patient and the physician. The lesion(s) was/were treated with cryotherapy for a total of 1 freeze/thaw cycles with the open spray technique. The patient tolerated the procedure well.  Wound care was discussed.   Location: as noted in exam  Total # of lesions treated:  15 (on face)    - patient to repeat Efudex 5% cream to the AA of the SCALP BID x 2 more weeks, then allow to heal. Then do treatment x2 weeks to face. AVOID eye area. Side effects of cream (significant irritation, erythema, scaling, itching, weeping, crusting, pain) discussed.   - Patient instructed to protect the skin from the sun.  - Aquaphor for itching/small open sores  - Patient was instructed to call me at any point if he has any concerns about proceeding with treatment or treatment response.    2. Biopsy proven AK - left hand  - healing biopsy site today  - will re-eval at next follow up and treat with cryo if there is any residual    3. Dermatitis - not discussed today  - xerotic eczema   - using triamcinolone 0.1%  ointment       Return to clinic in: Return 2-3 months as scheduled for field treatment follow up. and as needed for any new or changing skin lesions.    Marion Chapman M.D.

## 2020-08-11 NOTE — TELEPHONE ENCOUNTER
Received another clearance request from [facility]. Called pt to discuss. He still has not had appt with [facility] and plans to call them this week. He hasn't decided if he wants to proceed, but he is aware that if he does proceed and needs to hold Xarelto, he will need bridging with Lovenox. Faxed note to [facility] at [phone] notifying that we will a/w pt's decision.

## 2020-09-17 ENCOUNTER — TELEPHONE (OUTPATIENT)
Dept: CARDIOLOGY | Facility: MEDICAL CENTER | Age: 75
End: 2020-09-17

## 2020-09-17 NOTE — TELEPHONE ENCOUNTER
I called Marcin to clarify the [facility] surgery request form and the Lovenox/Coumadin bridging.  Marcin says that he is checking into bridging- he asked his PCP and may have this set up through [facility]; otherwise, he will call and let us know that he needs [facility] to do it.   I informed him that Dr. Bose would be back next week and he will be able to sign the clearance form at that time.  The procedure will not be scheduled until these details are taken care of.

## 2020-09-22 NOTE — TELEPHONE ENCOUNTER
[facility] calling in for instructions regarding lovenox, coumadin bridging. Deferred to anti-coag clinic.

## 2020-09-22 NOTE — TELEPHONE ENCOUNTER
Pt transferred by Maricruz. He is calling for instructions for Lovenox as [facility] is requesting this information from [facility]. Resent [facility] message per pt request w/ [facility] instructions from the last office visit note.

## 2020-09-23 ENCOUNTER — TELEPHONE (OUTPATIENT)
Dept: CARDIOLOGY | Facility: MEDICAL CENTER | Age: 75
End: 2020-09-23

## 2020-09-23 NOTE — TELEPHONE ENCOUNTER
Pt called in yesterday w/ more questions, possibly regarding lovenox instructions. LVM today asking pt to call back if he still needed assistance.

## 2020-10-26 ENCOUNTER — OFFICE VISIT (OUTPATIENT)
Dept: CARDIOLOGY | Facility: MEDICAL CENTER | Age: 75
End: 2020-10-26
Payer: MEDICARE

## 2020-10-26 VITALS
OXYGEN SATURATION: 91 % | BODY MASS INDEX: 30.75 KG/M2 | DIASTOLIC BLOOD PRESSURE: 60 MMHG | HEART RATE: 72 BPM | WEIGHT: 232 LBS | HEIGHT: 73 IN | SYSTOLIC BLOOD PRESSURE: 102 MMHG

## 2020-10-26 DIAGNOSIS — I10 ESSENTIAL HYPERTENSION: ICD-10-CM

## 2020-10-26 DIAGNOSIS — I25.10 CORONARY ARTERY DISEASE INVOLVING NATIVE HEART WITHOUT ANGINA PECTORIS, UNSPECIFIED VESSEL OR LESION TYPE: ICD-10-CM

## 2020-10-26 DIAGNOSIS — I48.0 PAROXYSMAL ATRIAL FIBRILLATION (HCC): ICD-10-CM

## 2020-10-26 DIAGNOSIS — E78.2 MIXED HYPERLIPIDEMIA: ICD-10-CM

## 2020-10-26 DIAGNOSIS — I65.23 BILATERAL CAROTID ARTERY STENOSIS: ICD-10-CM

## 2020-10-26 DIAGNOSIS — Z86.73 HISTORY OF CARDIOEMBOLIC CEREBROVASCULAR ACCIDENT (CVA): ICD-10-CM

## 2020-10-26 PROCEDURE — 99214 OFFICE O/P EST MOD 30 MIN: CPT | Performed by: INTERNAL MEDICINE

## 2020-10-26 RX ORDER — ATORVASTATIN CALCIUM 80 MG/1
80 TABLET, FILM COATED ORAL
Qty: 90 TAB | Refills: 3 | Status: SHIPPED | OUTPATIENT
Start: 2020-10-26 | End: 2021-02-04 | Stop reason: SDUPTHER

## 2020-10-26 ASSESSMENT — ENCOUNTER SYMPTOMS
FEVER: 0
PALPITATIONS: 0
ALTERED MENTAL STATUS: 0
IRREGULAR HEARTBEAT: 0
NAUSEA: 0
WEIGHT GAIN: 0
VOMITING: 0
SHORTNESS OF BREATH: 0
PND: 0
NEAR-SYNCOPE: 0
DEPRESSION: 0
FLANK PAIN: 0
SYNCOPE: 0
CLAUDICATION: 0
DYSPNEA ON EXERTION: 0
DIZZINESS: 0
ABDOMINAL PAIN: 0
BACK PAIN: 0
COUGH: 0
BLURRED VISION: 0
ORTHOPNEA: 0
CONSTIPATION: 0
HEARTBURN: 0
DECREASED APPETITE: 0
DIARRHEA: 0
WEIGHT LOSS: 0

## 2020-10-26 NOTE — PROGRESS NOTES
Cardiology Note    Chief Complaint   Patient presents with   • Coronary Artery Disease     F/V Dx: Coronary artery disease involving native heart without angina pectoris   • Carotid Artery Stenosis     F/V Dx: Bilateral & History of carotid endarterectomy   • Hyperlipidemia     F/V Dx: Mixed    • HTN (Controlled)       History of Present Illness: Carl Blount is a 75 y.o. male PMH CAD/PCI LONG RCA 4/2018, L CEA c/b dissection chronic, CVA ischemic multiple, paroxysmal atrial fibrillation found on loop recorder, HLD, HTN, chronic R iliac dissection who presents to establish care after moving to [place].     Still pending GI scopes. No cardiac complaints this visit. Less bruising since stopping plavix and continuing doac only. Compliant with medications and denies adverse effects. Home blood pressure reads higher than in office. He will take machine to next physician's appointment to compare. Not much activity. He will look into finding recumbent stationary bike.     Review of Systems   Constitution: Negative for decreased appetite, fever, malaise/fatigue, weight gain and weight loss.   HENT: Negative for congestion and nosebleeds.    Eyes: Negative for blurred vision.   Cardiovascular: Negative for chest pain, claudication, dyspnea on exertion, irregular heartbeat, leg swelling, near-syncope, orthopnea, palpitations, paroxysmal nocturnal dyspnea and syncope.   Respiratory: Negative for cough and shortness of breath.    Endocrine: Negative for cold intolerance and heat intolerance.   Skin: Negative for rash.   Musculoskeletal: Negative for back pain.   Gastrointestinal: Negative for abdominal pain, constipation, diarrhea, heartburn, melena, nausea and vomiting.   Genitourinary: Negative for dysuria, flank pain and hematuria.   Neurological: Negative for dizziness.   Psychiatric/Behavioral: Negative for altered mental status and depression.         Past Medical History:   Diagnosis Date   • Hyperlipidemia    •  Hypertension          History reviewed. No pertinent surgical history.      Current Outpatient Medications   Medication Sig Dispense Refill   • atorvastatin (LIPITOR) 80 MG tablet Take 1 Tab by mouth every bedtime. 90 Tab 3   • metoprolol SR (TOPROL XL) 25 MG TABLET SR 24 HR Take 1 Tab by mouth every day. 90 Tab 3   • triamcinolone acetonide (KENALOG) 0.1 % Ointment Apply twice daily to affected areas of body until the skin is smooth. Repeat as needed. Do not use on face or genitals. 454 g 1   • fluorouracil (EFUDEX) 5 % cream Twice daily to area on the SCALP for 2-4 weeks until red and scabbed. AVOID eyes. 1 Tube 1   • cetirizine (ZYRTEC) 10 MG Tab Take 10 mg by mouth every day.     • cyclobenzaprine (FLEXERIL) 10 MG Tab Take 10 mg by mouth 3 times a day as needed.     • hydroCHLOROthiazide (HYDRODIURIL) 25 MG Tab Take 25 mg by mouth every day.     • clobetasol (TEMOVATE) 0.05 % Cream Apply  to affected area(s) 2 times a day.     • lidocaine (LIDODERM) 5 % Patch Apply 1 Patch to skin as directed every 24 hours.     • lisinopril (PRINIVIL) 20 MG Tab Take 20 mg by mouth every day.     • LORazepam (ATIVAN) 1 MG Tab Take 0.5 mg by mouth every four hours as needed for Anxiety.     • pantoprazole (PROTONIX) 40 MG Tablet Delayed Response Take 40 mg by mouth every day.     • rivaroxaban (XARELTO) 20 MG Tab tablet Take 20 mg by mouth with dinner.     • tamsulosin (FLOMAX) 0.4 MG capsule Take 0.4 mg by mouth ONE-HALF HOUR AFTER BREAKFAST.     • zolpidem (AMBIEN) 5 MG Tab Take 10 mg by mouth at bedtime as needed for Sleep.       No current facility-administered medications for this visit.          No Known Allergies      History reviewed. No pertinent family history.      Social History     Socioeconomic History   • Marital status:      Spouse name: Not on file   • Number of children: Not on file   • Years of education: Not on file   • Highest education level: Not on file   Occupational History   • Not on file   Social  "Needs   • Financial resource strain: Not on file   • Food insecurity     Worry: Not on file     Inability: Not on file   • Transportation needs     Medical: Not on file     Non-medical: Not on file   Tobacco Use   • Smoking status: Former Smoker   • Smokeless tobacco: Never Used   Substance and Sexual Activity   • Alcohol use: Yes     Frequency: 2-3 times a week     Drinks per session: 1 or 2   • Drug use: Not on file   • Sexual activity: Not on file   Lifestyle   • Physical activity     Days per week: Not on file     Minutes per session: Not on file   • Stress: Not on file   Relationships   • Social connections     Talks on phone: Not on file     Gets together: Not on file     Attends [employer] service: Not on file     Active member of club or organization: Not on file     Attends meetings of clubs or organizations: Not on file     Relationship status: Not on file   • Intimate partner violence     Fear of current or ex partner: Not on file     Emotionally abused: Not on file     Physically abused: Not on file     Forced sexual activity: Not on file   Other Topics Concern   • Not on file   Social History Narrative   • Not on file         Physical Exam:  Ambulatory Vitals  /60 (BP Location: Left arm, Patient Position: Sitting, BP Cuff Size: Adult)   Pulse 72   Ht 1.854 m (6' 1\")   Wt 105.2 kg (232 lb)   SpO2 91%    BP Readings from Last 4 Encounters:   10/26/20 102/60   07/24/20 140/72     Weight/BMI:   Vitals:    10/26/20 1036   BP: 102/60   Weight: 105.2 kg (232 lb)   Height: 1.854 m (6' 1\")    Body mass index is 30.61 kg/m².  Wt Readings from Last 4 Encounters:   10/26/20 105.2 kg (232 lb)   07/24/20 104 kg (229 lb 4.5 oz)   03/05/20 98.9 kg (218 lb)       Physical Exam   Constitutional: He is oriented to person, place, and time and well-developed, well-nourished, and in no distress. No distress.   HENT:   Head: Normocephalic and atraumatic.   Eyes: Pupils are equal, round, and reactive to light. " Conjunctivae are normal.   Neck: Normal range of motion. Neck supple. No JVD present.   Cardiovascular: Normal rate, regular rhythm, normal heart sounds and intact distal pulses. Exam reveals no gallop and no friction rub.   No murmur heard.  Pulmonary/Chest: Effort normal and breath sounds normal. No respiratory distress. He has no wheezes. He has no rales. He exhibits no tenderness.   Abdominal: Soft. Bowel sounds are normal. He exhibits no distension.   Musculoskeletal:         General: No edema.   Neurological: He is alert and oriented to person, place, and time.   Skin: Skin is warm and dry.   Psychiatric: Affect and judgment normal.       Lab Data Review:  No results found for: CHOLSTRLTOT, LDL, HDL, TRIGLYCERIDE    No results found for: SODIUM, POTASSIUM, CHLORIDE, CO2, GLUCOSE, BUN, CREATININE, BUNCREATRAT, GLOMRATE  CrCl cannot be calculated (No successful lab value found.).  No results found for: ALKPHOSPHAT, ASTSGOT, ALTSGPT, TBILIRUBIN   No results found for: WBC, HCT  No results found for: HBA1C  No components found for: TROP    Outside labs 06/2020  Tot chol 128, trig 40, HDL 57, LDL 63  U/a wnl, a1c 5.7, cbc wnl, bmp wnl, micro alb / creat <3.6 mg/g wnl, TSH wnl    Cardiac Imaging and Procedures Review:      Carotid doppler 7/29/20  FINDINGS   RIGHT:   Moderate appearing calcific plaque of the bifurcation extending into the    internal carotid yet velocities are consistent with < 50% stenosis of the    internal carotid artery.    LEFT:   Patent internal carotid artery with no hemodynamically significant plaque    following endarterectomy.   Bilateral subclavian and vertebral artery waveforms are antegrade and    waveforms are normal in character and velocity.     [facility]I 2/2018 outside study  1. Quality of study: good.  2. There s is TID.  3. The LV volumes are enlarged..  4. SPECT images demonstrate infarct of the inferolateral wall with minor ama-infarct ischemia.  5. The gate stress LVEF is 53%    [facility]  04/2018 outside study  Procedure Performed:  1. R femoral artery access  2. Left heart catheterization with selective coronary angiography  3. PCI ostial RCA - XIENCE 2.25 x 12mm post dilated NC trek 2.5 x 12 to 20 narciso  4. Perclose R CFA  Summary of Findings:  1. 99.9% ostial RCA  2. Left main no disease  3. LAD minimal disease  4. LCx no disease  5. LVEDP 8mmHg  6. Chronic dissection R external iliac artery dual lumen    Carotid artery ultrasound 2013 [facility] outside study  1.  Compared to carotid arterial ultrasonography performed 2/15/2012-     a)   Left carotid arterial bifurcation has returned to normal  appearance status post endarterectomy.     b)   Mild-moderate degree right carotid bulb and proximal right  internal carotid arterial plaque without evidence of hemodynamically  significant disease is present and not substantially changed.    c)  There remains normal antegrade flow within the cervical right  and left vertebral arteries.    CTA neck s/p CEA 2012  1. Postoperative change compatible with interval left carotid  endarterectomy. The left common and internal carotid arteries appear  patent along their course.  2. Focal dissection flap is present within the proximal left external  carotid artery, without evidence of distal occlusion.  3. Stable atherosclerotic disease and stenosis of the right common  and internal carotid arteries, as described above.  4. Stable atherosclerotic disease of the origin of the right  vertebral artery.    TTE 2012  Interpretation Summary  1.  Negative contrast study for interatrial shunt. 2.    Mild LVH with normal LV size and systolic function. No   significant valvular abnormality noted. 3.  No prior   study for comparison.    Medical Decision Making:  Problem List Items Addressed This Visit     Essential hypertension    Relevant Medications    atorvastatin (LIPITOR) 80 MG tablet    Bilateral carotid artery stenosis    Relevant Medications    atorvastatin (LIPITOR) 80  MG tablet    Coronary artery disease involving native heart without angina pectoris    Relevant Medications    atorvastatin (LIPITOR) 80 MG tablet    History of cardioembolic cerebrovascular accident (CVA)    Mixed hyperlipidemia    Relevant Medications    atorvastatin (LIPITOR) 80 MG tablet    Paroxysmal atrial fibrillation (HCC)    Relevant Medications    atorvastatin (LIPITOR) 80 MG tablet        CAD / HLD / hx CVA - asymptomatic. Continue doac. No additional antiplatelet needed. Continue statin. LDL at goal <70.    Paroxysmal AF - chadsvasc 6 - continue xarelto for cva prevention. Continue metoprolol for rate control. He will need bridging with lovenox for procedures; 1mg/kg bid or option 1.5mg/kg daily.     HTN - controlled. Goal 120/80. Continue current regimen.    Carotid / iliac chronic dissections - serial testing.     It was my pleasure to meet with Mr. Blount.

## 2020-11-05 ENCOUNTER — OFFICE VISIT (OUTPATIENT)
Dept: DERMATOLOGY | Facility: IMAGING CENTER | Age: 75
End: 2020-11-05
Payer: MEDICARE

## 2020-11-05 DIAGNOSIS — D48.5 NEOPLASM OF UNCERTAIN BEHAVIOR OF SKIN: ICD-10-CM

## 2020-11-05 DIAGNOSIS — L57.0 ACTINIC KERATOSES: ICD-10-CM

## 2020-11-05 PROCEDURE — 99213 OFFICE O/P EST LOW 20 MIN: CPT | Mod: 25 | Performed by: DERMATOLOGY

## 2020-11-05 PROCEDURE — 11102 TANGNTL BX SKIN SINGLE LES: CPT | Mod: 59 | Performed by: DERMATOLOGY

## 2020-11-05 PROCEDURE — 17004 DESTROY PREMAL LESIONS 15/>: CPT | Performed by: DERMATOLOGY

## 2020-11-05 RX ORDER — BETAMETHASONE DIPROPIONATE 0.05 %
OINTMENT (GRAM) TOPICAL
Qty: 45 G | Refills: 2 | Status: SHIPPED | OUTPATIENT
Start: 2020-11-05 | End: 2021-06-18

## 2020-11-05 NOTE — PROGRESS NOTES
DERMATOLOGY NOTE  FOLLOW UP VISIT       Chief complaint: Follow-Up and Actinic Keratosis     AK follow up on scalp. Feels like it is healing decently, however after using efudex they come back. He uses for 1.5-2 weeks and they seem to go away but then come back. Freezing seems to work better.    History of skin cancer: Yes, Details: scalp type unknown  History of precancers/actinic keratoses: Yes, Details: cryac  History of biopsies:Yes, Details: see above  History of blistering/severe sunburns:Yes, Details: several  Family history of skin cancer:No  Family history of atypical moles:No      Past Medical History:   Diagnosis Date   • Hyperlipidemia    • Hypertension         History reviewed. No pertinent family history.     Social History     Socioeconomic History   • Marital status:      Spouse name: Not on file   • Number of children: Not on file   • Years of education: Not on file   • Highest education level: Not on file   Occupational History   • Not on file   Social Needs   • Financial resource strain: Not on file   • Food insecurity     Worry: Not on file     Inability: Not on file   • Transportation needs     Medical: Not on file     Non-medical: Not on file   Tobacco Use   • Smoking status: Former Smoker   • Smokeless tobacco: Never Used   Substance and Sexual Activity   • Alcohol use: Yes     Frequency: 2-3 times a week     Drinks per session: 1 or 2   • Drug use: Not on file   • Sexual activity: Not on file   Lifestyle   • Physical activity     Days per week: Not on file     Minutes per session: Not on file   • Stress: Not on file   Relationships   • Social connections     Talks on phone: Not on file     Gets together: Not on file     Attends [employer] service: Not on file     Active member of club or organization: Not on file     Attends meetings of clubs or organizations: Not on file     Relationship status: Not on file   • Intimate partner violence     Fear of current or ex partner: Not on file      Emotionally abused: Not on file     Physically abused: Not on file     Forced sexual activity: Not on file   Other Topics Concern   • Not on file   Social History Narrative   • Not on file        No Known Allergies     MEDICATIONS:  Medications relevant to specialty reviewed.    Current Outpatient Medications:   •  atorvastatin (LIPITOR) 80 MG tablet, Take 1 Tab by mouth every bedtime., Disp: 90 Tab, Rfl: 3  •  metoprolol SR (TOPROL XL) 25 MG TABLET SR 24 HR, Take 1 Tab by mouth every day., Disp: 90 Tab, Rfl: 3  •  triamcinolone acetonide (KENALOG) 0.1 % Ointment, Apply twice daily to affected areas of body until the skin is smooth. Repeat as needed. Do not use on face or genitals., Disp: 454 g, Rfl: 1  •  fluorouracil (EFUDEX) 5 % cream, Twice daily to area on the SCALP for 2-4 weeks until red and scabbed. AVOID eyes., Disp: 1 Tube, Rfl: 1  •  cetirizine (ZYRTEC) 10 MG Tab, Take 10 mg by mouth every day., Disp: , Rfl:   •  cyclobenzaprine (FLEXERIL) 10 MG Tab, Take 10 mg by mouth 3 times a day as needed., Disp: , Rfl:   •  hydroCHLOROthiazide (HYDRODIURIL) 25 MG Tab, Take 25 mg by mouth every day., Disp: , Rfl:   •  clobetasol (TEMOVATE) 0.05 % Cream, Apply  to affected area(s) 2 times a day., Disp: , Rfl:   •  lidocaine (LIDODERM) 5 % Patch, Apply 1 Patch to skin as directed every 24 hours., Disp: , Rfl:   •  lisinopril (PRINIVIL) 20 MG Tab, Take 20 mg by mouth every day., Disp: , Rfl:   •  LORazepam (ATIVAN) 1 MG Tab, Take 0.5 mg by mouth every four hours as needed for Anxiety., Disp: , Rfl:   •  pantoprazole (PROTONIX) 40 MG Tablet Delayed Response, Take 40 mg by mouth every day., Disp: , Rfl:   •  rivaroxaban (XARELTO) 20 MG Tab tablet, Take 20 mg by mouth with dinner., Disp: , Rfl:   •  tamsulosin (FLOMAX) 0.4 MG capsule, Take 0.4 mg by mouth ONE-HALF HOUR AFTER BREAKFAST., Disp: , Rfl:   •  zolpidem (AMBIEN) 5 MG Tab, Take 10 mg by mouth at bedtime as needed for Sleep., Disp: , Rfl:      REVIEW OF SYSTEMS:    Positive for skin (see HPI)  Negative for fevers, chills and cough     EXAM:  There were no vitals taken for this visit.  Constitutional: Well-developed, well-nourished, and in no distress.   HENT: Head normocephalic and atraumatic.   Neurological: Alert and oriented.    A focused skin exam was performed including the affected areas of the head (including face), neck and bilateral upper extremities. Notable findings on exam today listed below. Remaining above-listed examined areas within normal limits / negative for rashes or lesions.    - right forearm with 1 cm pink hyperkeratotic papule  - scalp, forehead and temples with scattered pink gritty papules and diffuse underlying actinic damage  - scalp with well healed scar(s) and no evidence of recurrence on inspection or palpation  - lower legs with ill defined pink scaly papules and plaques with excoriations    IMPRESSION / PLAN:    1. Neoplasm of uncertain behavior of skin  - Discussed monitoring vs biopsy, patient agreeable to biopsy today, see procedure note below  - Biopsy Procedure Note: biopsy by shave technique  - Location: right forearm  - Size: as noted in exam  - Total specimens sent for pathology: 1  - Photo taken with patient permission (see media tab)  - Preoperative diagnosis: r/o scc vs hak  - Plan if scc then wle. Is Ak then recheck at 6 week follow up.    Shave bx x1   Risks, benefits and alternatives of procedure discussed, verbal consent obtained for photo and written informed consent obtained for procedure. Time out completed. Area of biopsy prepped with alcohol. Anesthesia with 1% lidocaine with epinephrine administered with 30 gauge needle. Shave biopsy of the site performed. Hemostasis achieved with aluminum chloride. Vaseline applied to wound with bandage. Patient tolerated procedure well and there were no complications. The specimen was sent to the pathology lab by the staff. Wound care was discussed.      2. Actinic keratoses  -  previously treated with several round of Efudex for 2 week on scalp - gets some AKs but come back quickly, patient thinks cryo works better, will try serial cryo every 6-8 weeks until actinic damage improved    Procedure Note: Informed verbal consent including risk of redness, swelling, pain, blistering, scar, bleeding, infection and need for possible further treatment was obtained. The location(s) was/were identified by the patient and the physician. The lesion(s) was/were treated with cryotherapy for a total of 1 freeze/thaw cycles with the open spray technique. The patient tolerated the procedure well.  Wound care was discussed.   Location: as noted in exam  Total # of lesions treated:  15+    3. 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 and as needed for any new or changing skin lesions.    Marion Chapman M.D.

## 2020-11-10 ENCOUNTER — TELEPHONE (OUTPATIENT)
Dept: DERMATOLOGY | Facility: IMAGING CENTER | Age: 75
End: 2020-11-10

## 2020-11-10 NOTE — TELEPHONE ENCOUNTER
Called patient. No answer. Left voicemail for patient to call back for results.    Pathology was consistent with a malignant lesion (squamous cell carcinoma, invasive). Further treatment is recommended with wide local excision (WLE).     See media tab for path report (D-path).

## 2020-11-10 NOTE — TELEPHONE ENCOUNTER
Patient returned call and per Dr. Chapman, was given pathology results from 11/5/20 and recommendation for WLE in office.  Patient expressed understanding and was agreeable to plan of care.      PA will be obtained and we will contact patient to schedule WLE with Dr. Taylor.

## 2020-11-11 ENCOUNTER — TELEPHONE (OUTPATIENT)
Dept: DERMATOLOGY | Facility: IMAGING CENTER | Age: 75
End: 2020-11-11

## 2020-11-17 ENCOUNTER — OFFICE VISIT (OUTPATIENT)
Dept: DERMATOLOGY | Facility: IMAGING CENTER | Age: 75
End: 2020-11-17
Payer: MEDICARE

## 2020-11-17 DIAGNOSIS — D48.5 NEOPLASM OF UNCERTAIN BEHAVIOR OF SKIN: ICD-10-CM

## 2020-11-17 PROCEDURE — 11102 TANGNTL BX SKIN SINGLE LES: CPT | Performed by: NURSE PRACTITIONER

## 2020-11-17 ASSESSMENT — ENCOUNTER SYMPTOMS
FEVER: 0
CHILLS: 0

## 2020-11-17 NOTE — PROGRESS NOTES
Dermatology Return Patient Visit    Chief Complaint   Patient presents with   • Skin Lesion       Subjective:     HPI:   Carl Blount is a 75 y.o. 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 Repeat as prn. Avoid face or genitals. 45 g 2   • atorvastatin (LIPITOR) 80 MG tablet Take 1 Tab by mouth every bedtime. 90 Tab 3   • metoprolol SR (TOPROL XL) 25 MG TABLET SR 24 HR Take 1 Tab by mouth every day. 90 Tab 3   • triamcinolone acetonide (KENALOG) 0.1 % Ointment Apply twice daily to affected areas of body until the skin is smooth. Repeat as needed. Do not use on face or genitals. 454 g 1   • fluorouracil (EFUDEX) 5 % cream Twice daily to area on the SCALP for 2-4 weeks until red and scabbed. AVOID eyes. 1 Tube 1   • cetirizine (ZYRTEC) 10 MG Tab Take 10 mg by mouth every day.     • cyclobenzaprine (FLEXERIL) 10 MG Tab Take 10 mg by mouth 3 times a day as needed.     • hydroCHLOROthiazide (HYDRODIURIL) 25 MG Tab Take 25 mg by mouth every day.     • clobetasol (TEMOVATE) 0.05 % Cream Apply  to affected area(s) 2 times a day.     • lidocaine (LIDODERM) 5 % Patch Apply 1 Patch to  skin as directed every 24 hours.     • lisinopril (PRINIVIL) 20 MG Tab Take 20 mg by mouth every day.     • LORazepam (ATIVAN) 1 MG Tab Take 0.5 mg by mouth every four hours as needed for Anxiety.     • pantoprazole (PROTONIX) 40 MG Tablet Delayed Response Take 40 mg by mouth every day.     • rivaroxaban (XARELTO) 20 MG Tab tablet Take 20 mg by mouth with dinner.     • tamsulosin (FLOMAX) 0.4 MG capsule Take 0.4 mg by mouth ONE-HALF HOUR AFTER BREAKFAST.     • zolpidem (AMBIEN) 5 MG Tab Take 10 mg by mouth at bedtime as needed for Sleep.       No current facility-administered medications on file prior to visit.        No Known Allergies    History reviewed. No pertinent family history.    Social History     Socioeconomic History   • Marital status:      Spouse name: Not on file   • Number of children: Not on file   • Years of education: Not on file   • Highest education level: Not on file   Occupational History   • Not on file   Social Needs   • Financial resource strain: Not on file   • Food insecurity     Worry: Not on file     Inability: Not on file   • Transportation needs     Medical: Not on file     Non-medical: Not on file   Tobacco Use   • Smoking status: Former Smoker   • Smokeless tobacco: Never Used   Substance and Sexual Activity   • Alcohol use: Yes     Frequency: 2-3 times a week     Drinks per session: 1 or 2   • Drug use: Not on file   • Sexual activity: Not on file   Lifestyle   • Physical activity     Days per week: Not on file     Minutes per session: Not on file   • Stress: Not on file   Relationships   • Social connections     Talks on phone: Not on file     Gets together: Not on file     Attends Confucianism service: Not on file     Active member of club or organization: Not on file     Attends meetings of clubs or organizations: Not on file     Relationship status: Not on file   • Intimate partner violence     Fear of current or ex partner: Not on file     Emotionally abused: Not on file      Physically abused: Not on file     Forced sexual activity: Not on file   Other Topics Concern   • Not on file   Social History Narrative   • Not on file       Review of Systems   Constitutional: Negative for chills and fever.   Skin: Negative for itching and rash.        Objective:     A focused cutaneous exam was completed including: left lower ext and face above mask with the following pertinent findings listed below. Remaining above-listed examined areas within normal limits / negative for rashes or lesions.      There were no vitals taken for this visit.    Physical Exam   Constitutional: He is oriented to person, place, and time and well-developed, well-nourished, and in no distress. No distress.   HENT:   Head: Normocephalic.   Pulmonary/Chest: Effort normal.   Neurological: He is alert and oriented to person, place, and time.   Skin: Skin is warm and dry. No rash noted. No erythema.        Psychiatric: Mood normal.       DATA: none applicable to review    Assessment and Plan:     1. Neoplasm of uncertain behavior of skin  Procedure Note   Procedure: Biopsy by shave technique  Location: left lower leg  Size: as noted in exam  Preoperative diagnosis:SCc, KA, vs. other  Risks, benefits and alternatives of procedure discussed and written informed consent obtained for procedure and verbal consent for photos. Time out completed. Area of biopsy prepped with alcohol. Anesthesia with 1% lidocaine with epinephrine administered with 30 gauge needle. Shave biopsy of the site performed. Hemostasis achieved with pressure and aluminum chloride. Vaseline applied to wound with bandage. Patient tolerated procedure well and there were no complications. The specimen was sent to the pathology lab by the staff. Wound care was discussed.        Followup: Return for Pending biopsy results or for any growing and changing skin lesions.    DONOVAN Esqueda.

## 2020-11-25 ENCOUNTER — TELEPHONE (OUTPATIENT)
Dept: DERMATOLOGY | Facility: IMAGING CENTER | Age: 75
End: 2020-11-25

## 2020-11-25 NOTE — TELEPHONE ENCOUNTER
Pt notified of path results, scanned into chart. Pt aware referral will be faxed to [facility]. Waiting for [facility] to place referral.

## 2020-12-01 ENCOUNTER — TELEPHONE (OUTPATIENT)
Dept: DERMATOLOGY | Facility: IMAGING CENTER | Age: 75
End: 2020-12-01

## 2020-12-01 DIAGNOSIS — C44.729 SQUAMOUS CELL CARCINOMA OF SKIN OF LEFT LOWER LIMB, INCLUDING HIP: ICD-10-CM

## 2020-12-03 ENCOUNTER — OFFICE VISIT (OUTPATIENT)
Dept: DERMATOLOGY | Facility: IMAGING CENTER | Age: 75
End: 2020-12-03
Payer: MEDICARE

## 2020-12-03 VITALS
WEIGHT: 231 LBS | TEMPERATURE: 97.5 F | SYSTOLIC BLOOD PRESSURE: 126 MMHG | HEIGHT: 73 IN | BODY MASS INDEX: 30.62 KG/M2 | DIASTOLIC BLOOD PRESSURE: 72 MMHG

## 2020-12-03 DIAGNOSIS — C44.622 SCC (SQUAMOUS CELL CARCINOMA), ARM, RIGHT: ICD-10-CM

## 2020-12-03 PROCEDURE — 12032 INTMD RPR S/A/T/EXT 2.6-7.5: CPT | Performed by: DERMATOLOGY

## 2020-12-03 PROCEDURE — 11602 EXC TR-EXT MAL+MARG 1.1-2 CM: CPT | Performed by: DERMATOLOGY

## 2020-12-03 NOTE — PROGRESS NOTES
"PROCEDURE NOTE:    MALIGNANT LESION - WIDE LOCAL EXCISION    After patient received diagnosis of squamous cell carcinoma, invasive, further management was discussed, including wide local excision vs other. Patient opted for wide local excision. Risks, benefits and alternatives of procedure, including, but not limited to scar, bleeding, pain, infection, nerve damage, recurrence of tumor, failed surgery, and need for further surgery, were discussed and written informed consent obtained. Correct site was verified by patient and myself, and verbal time out completed.     Allergies reviewed: Yes  Pacemaker/defibrillator: No  Artificial joints: Yes, 7-8 years ago, hip  Antibiotics given: No  Blood thinners/anticoagulants: Yes  Xarelto, last night    Pre-op diagnosis:SCC (requisition number: see [facility])  Post-op diagnosis: Same  Site:right arm  Pre-op size: 1cm + 4mm margins = 1.8cm  Antibiotics: no    /72   Temp 36.4 °C (97.5 °F)   Ht 1.854 m (6' 1\")   Wt 104.8 kg (231 lb)     Procedure: Area of surgery was prepped with alcohol, marked with 4mm margins, and with sterile marking pen. Anesthesia with 1% lidocaine with epinephrine administered with 30 gauge needle. The area was again cleaned with chlorhexidine swab. With sterile technique, a 15 blade scalpel was used to make an elliptical incision around the tumor to the level of the subcutaneous fat. The tumor was removed. Bleeding was minimal, and hemostasis was achieved with pressure, hyfrecation. Specimen was placed into biopsy container and sent to pathology by staff.    Intermediate Closure:  Buried vertical mattress sutures were placed with 4.0 monocryl to close dead space. 4.0 prolene running epidermal sutures were placed to approximate wound edges.  Vaseline applied to wound with bandage. Patient tolerated procedure well and there were no complications, blood loss was minimal.     Final wound size: 5.4 cm    Bandage was placed with vaseline, telfa, gauze and " tape. Wound care was discussed with the patient, and written instructions were provided. Patient to return to clinic in 10-14 days for suture removal. Patient to call us if any problems or concerns with the procedure site arise prior to scheduled appointment.     Additional follow-up will be planned for ~6 months for total skin exam    Idania Taylor MD

## 2020-12-16 ENCOUNTER — NON-PROVIDER VISIT (OUTPATIENT)
Dept: DERMATOLOGY | Facility: IMAGING CENTER | Age: 75
End: 2020-12-16
Payer: MEDICARE

## 2020-12-16 NOTE — PROGRESS NOTES
Marcin Blount is a 75 y.o. male here for a Non-Provider Visit for Suture Removal.    Wound check of lower left leg biopsy site.  Patient referred to [facility] for [facility]s.   Per HA Powell, applied mupirocin ointment and bandaged.    Sutures were placed by Dr. Taylor on date: 12/3/20  Skin is healed: Yes  Provider notified if skin is not healed, or if there is redness, heat, pain, or drainage from incision: N\A  Sutures removed.   Mastisol and steristips are placed: Yes    Advised to use emollient (vaseline, aquaphor, etc.) as needed, avoid peroxide and antibiotic ointment to reduce irritation.     Path report has been reviewed by provider.  Path report has reviewed with patient.

## 2020-12-22 ENCOUNTER — OFFICE VISIT (OUTPATIENT)
Dept: DERMATOLOGY | Facility: IMAGING CENTER | Age: 75
End: 2020-12-22
Payer: MEDICARE

## 2020-12-22 DIAGNOSIS — L57.0 ACTINIC KERATOSES: ICD-10-CM

## 2020-12-22 PROCEDURE — 17003 DESTRUCT PREMALG LES 2-14: CPT | Performed by: NURSE PRACTITIONER

## 2020-12-22 PROCEDURE — 17000 DESTRUCT PREMALG LESION: CPT | Performed by: NURSE PRACTITIONER

## 2020-12-22 ASSESSMENT — ENCOUNTER SYMPTOMS
FEVER: 0
CHILLS: 0

## 2020-12-22 NOTE — PROGRESS NOTES
Dermatology Return Patient Visit    Chief Complaint   Patient presents with   • Follow-Up   • Actinic Keratosis       Subjective:     HPI:   Carl Blount is a 75 y.o. male presenting for    Here today for AK follow up.  Comes for serial AKs cyro on face and scalp  Declines field treatment.     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, SCC 12/3/20, invasive SCC excised on 12/21/2020 at [facility]  History of precancers/actinic keratoses: Yes, Details: cryac  History of biopsies:Yes, Details: see above  History of blistering/severe sunburns:Yes, Details: several  Family history of skin cancer:No  Family history of atypical moles:No           Past Medical History:   Diagnosis Date   • Hyperlipidemia    • Hypertension        Current Outpatient Medications on File Prior to Visit   Medication Sig Dispense Refill   • betamethasone dipropionate (DIPROLENE) 0.05 % Ointment Apply twice daily to rash areas of the arms and leg until the skin is smooth or up to 3 weeks, then stop 1 week. Repeat as prn. Avoid face or genitals. 45 g 2   • atorvastatin (LIPITOR) 80 MG tablet Take 1 Tab by mouth every bedtime. 90 Tab 3   • metoprolol SR (TOPROL XL) 25 MG TABLET SR 24 HR Take 1 Tab by mouth every day. 90 Tab 3   • triamcinolone acetonide (KENALOG) 0.1 % Ointment Apply twice daily to affected areas of body until the skin is smooth. Repeat as needed. Do not use on face or genitals. 454 g 1   • fluorouracil (EFUDEX) 5 % cream Twice daily to area on the SCALP for 2-4 weeks until red and scabbed. AVOID eyes. 1 Tube 1   • cetirizine (ZYRTEC) 10 MG Tab Take 10 mg by mouth every day.     • cyclobenzaprine (FLEXERIL) 10 MG Tab Take 10 mg by mouth 3 times a day as needed.     • hydroCHLOROthiazide (HYDRODIURIL) 25 MG Tab Take 25 mg by mouth every day.     • clobetasol (TEMOVATE) 0.05 % Cream Apply  to affected area(s) 2 times a day.     • lidocaine (LIDODERM) 5 % Patch Apply 1 Patch to skin as  directed every 24 hours.     • lisinopril (PRINIVIL) 20 MG Tab Take 20 mg by mouth every day.     • LORazepam (ATIVAN) 1 MG Tab Take 0.5 mg by mouth every four hours as needed for Anxiety.     • pantoprazole (PROTONIX) 40 MG Tablet Delayed Response Take 40 mg by mouth every day.     • rivaroxaban (XARELTO) 20 MG Tab tablet Take 20 mg by mouth with dinner.     • tamsulosin (FLOMAX) 0.4 MG capsule Take 0.4 mg by mouth ONE-HALF HOUR AFTER BREAKFAST.     • zolpidem (AMBIEN) 5 MG Tab Take 10 mg by mouth at bedtime as needed for Sleep.       No current facility-administered medications on file prior to visit.        No Known Allergies    History reviewed. No pertinent family history.    Social History     Socioeconomic History   • Marital status:      Spouse name: Not on file   • Number of children: Not on file   • Years of education: Not on file   • Highest education level: Not on file   Occupational History   • Not on file   Social Needs   • Financial resource strain: Not on file   • Food insecurity     Worry: Not on file     Inability: Not on file   • Transportation needs     Medical: Not on file     Non-medical: Not on file   Tobacco Use   • Smoking status: Former Smoker   • Smokeless tobacco: Never Used   Substance and Sexual Activity   • Alcohol use: Yes     Frequency: 2-3 times a week     Drinks per session: 1 or 2   • Drug use: Not on file   • Sexual activity: Not on file   Lifestyle   • Physical activity     Days per week: Not on file     Minutes per session: Not on file   • Stress: Not on file   Relationships   • Social connections     Talks on phone: Not on file     Gets together: Not on file     Attends Scientology service: Not on file     Active member of club or organization: Not on file     Attends meetings of clubs or organizations: Not on file     Relationship status: Not on file   • Intimate partner violence     Fear of current or ex partner: Not on file     Emotionally abused: Not on file      Physically abused: Not on file     Forced sexual activity: Not on file   Other Topics Concern   • Not on file   Social History Narrative   • Not on file       Review of Systems   Constitutional: Negative for chills and fever.   Skin: Negative for itching and rash.        Objective:     A focused cutaneous exam was completed including: scalp, hair, ears, face, eyelids and lips with the following pertinent findings listed below. Remaining above-listed examined areas within normal limits / negative for rashes or lesions.      There were no vitals taken for this visit.    Physical Exam   Constitutional: He is oriented to person, place, and time and well-developed, well-nourished, and in no distress. No distress.   HENT:   Head: Normocephalic.   Ill-defined erythematous gritty/scaly papules over the forehead cheek scalp     Pulmonary/Chest: Effort normal.   Neurological: He is alert and oriented to person, place, and time.   Skin: Skin is warm and dry. No rash noted. No erythema.   Psychiatric: Mood normal.       DATA: none applicable to review    Assessment and Plan:     1. Actinic keratoses  CRYOTHERAPY:  Risks (including, but not limited to: hypo or hyperpigmentation, redness, blister, blood blister, recurrence, need for further treatment, infection, scar) and benefits of cryotherapy discussed. Patient verbally agreed to proceed with treatment. 2 cryotherapy freeze thaw cycles of 10 seconds were applied to 9 lesions on scalp and face with cryac. Patient tolerated procedure well. Aftercare instructions given.    I have performed a physical exam and reviewed and updated ROS and Plan today (12/22/2020). In review of dermatology visit (11/17/2020), there are no changes except as documented above.     Followup: Return in about 3 months (around 3/22/2021) for AKs re-check. sooner for any growing or changing skin lesions.    DONOVAN Esqueda.

## 2020-12-22 NOTE — PROGRESS NOTES
Dermatology Return Patient Visit    Chief Complaint   Patient presents with   • Follow-Up   • Actinic Keratosis       Subjective:     HPI:   Carl Blount is a 75 y.o. male presenting for    HPI    Past Medical History:   Diagnosis Date   • Hyperlipidemia    • Hypertension        Current Outpatient Medications on File Prior to Visit   Medication Sig Dispense Refill   • betamethasone dipropionate (DIPROLENE) 0.05 % Ointment Apply twice daily to rash areas of the arms and leg until the skin is smooth or up to 3 weeks, then stop 1 week. Repeat as prn. Avoid face or genitals. 45 g 2   • atorvastatin (LIPITOR) 80 MG tablet Take 1 Tab by mouth every bedtime. 90 Tab 3   • metoprolol SR (TOPROL XL) 25 MG TABLET SR 24 HR Take 1 Tab by mouth every day. 90 Tab 3   • triamcinolone acetonide (KENALOG) 0.1 % Ointment Apply twice daily to affected areas of body until the skin is smooth. Repeat as needed. Do not use on face or genitals. 454 g 1   • fluorouracil (EFUDEX) 5 % cream Twice daily to area on the SCALP for 2-4 weeks until red and scabbed. AVOID eyes. 1 Tube 1   • cetirizine (ZYRTEC) 10 MG Tab Take 10 mg by mouth every day.     • cyclobenzaprine (FLEXERIL) 10 MG Tab Take 10 mg by mouth 3 times a day as needed.     • hydroCHLOROthiazide (HYDRODIURIL) 25 MG Tab Take 25 mg by mouth every day.     • clobetasol (TEMOVATE) 0.05 % Cream Apply  to affected area(s) 2 times a day.     • lidocaine (LIDODERM) 5 % Patch Apply 1 Patch to skin as directed every 24 hours.     • lisinopril (PRINIVIL) 20 MG Tab Take 20 mg by mouth every day.     • LORazepam (ATIVAN) 1 MG Tab Take 0.5 mg by mouth every four hours as needed for Anxiety.     • pantoprazole (PROTONIX) 40 MG Tablet Delayed Response Take 40 mg by mouth every day.     • rivaroxaban (XARELTO) 20 MG Tab tablet Take 20 mg by mouth with dinner.     • tamsulosin (FLOMAX) 0.4 MG capsule Take 0.4 mg by mouth ONE-HALF HOUR AFTER BREAKFAST.     • zolpidem (AMBIEN) 5 MG Tab Take 10  mg by mouth at bedtime as needed for Sleep.       No current facility-administered medications on file prior to visit.        No Known Allergies    History reviewed. No pertinent family history.    Social History     Socioeconomic History   • Marital status:      Spouse name: Not on file   • Number of children: Not on file   • Years of education: Not on file   • Highest education level: Not on file   Occupational History   • Not on file   Social Needs   • Financial resource strain: Not on file   • Food insecurity     Worry: Not on file     Inability: Not on file   • Transportation needs     Medical: Not on file     Non-medical: Not on file   Tobacco Use   • Smoking status: Former Smoker   • Smokeless tobacco: Never Used   Substance and Sexual Activity   • Alcohol use: Yes     Frequency: 2-3 times a week     Drinks per session: 1 or 2   • Drug use: Not on file   • Sexual activity: Not on file   Lifestyle   • Physical activity     Days per week: Not on file     Minutes per session: Not on file   • Stress: Not on file   Relationships   • Social connections     Talks on phone: Not on file     Gets together: Not on file     Attends Baptist service: Not on file     Active member of club or organization: Not on file     Attends meetings of clubs or organizations: Not on file     Relationship status: Not on file   • Intimate partner violence     Fear of current or ex partner: Not on file     Emotionally abused: Not on file     Physically abused: Not on file     Forced sexual activity: Not on file   Other Topics Concern   • Not on file   Social History Narrative   • Not on file       ROS     Objective:     A {kmskinexamfullfocused:20740} exam was completed including: scalp, hair, ears, face, eyelids, conjunctiva, lips, gums/tongue/oropharynx***, neck, {KMTSECB:20696}, abdomen, back, left and right upper extremities (including {KMTSEhands:20697}), left and right lower extremities (including {kmtsefeet:20698}),  buttocks, [facility]197} external genitalia (patient refusal***) with the following pertinent findings listed below. Remaining above-listed examined areas within normal limits / negative for rashes or lesions.    There were no vitals taken for this visit.    Physical Exam    DATA: none applicable to review***    Assessment and Plan:     There are no diagnoses linked to this encounter.    Followup: No follow-ups on file.    Shadi Pierce, Med Ass't

## 2021-01-12 DIAGNOSIS — Z23 NEED FOR VACCINATION: ICD-10-CM

## 2021-01-25 ENCOUNTER — OFFICE VISIT (OUTPATIENT)
Dept: CARDIOLOGY | Facility: MEDICAL CENTER | Age: 76
End: 2021-01-25
Payer: MEDICARE

## 2021-01-25 VITALS
WEIGHT: 233 LBS | HEIGHT: 73 IN | OXYGEN SATURATION: 95 % | HEART RATE: 60 BPM | DIASTOLIC BLOOD PRESSURE: 64 MMHG | SYSTOLIC BLOOD PRESSURE: 110 MMHG | BODY MASS INDEX: 30.88 KG/M2

## 2021-01-25 DIAGNOSIS — I10 ESSENTIAL HYPERTENSION: ICD-10-CM

## 2021-01-25 DIAGNOSIS — Z86.73 HISTORY OF CARDIOEMBOLIC CEREBROVASCULAR ACCIDENT (CVA): ICD-10-CM

## 2021-01-25 DIAGNOSIS — E78.2 MIXED HYPERLIPIDEMIA: ICD-10-CM

## 2021-01-25 DIAGNOSIS — I77.72 ILIAC DISSECTION (HCC): ICD-10-CM

## 2021-01-25 DIAGNOSIS — I25.10 CORONARY ARTERY DISEASE INVOLVING NATIVE HEART WITHOUT ANGINA PECTORIS, UNSPECIFIED VESSEL OR LESION TYPE: ICD-10-CM

## 2021-01-25 DIAGNOSIS — Z98.890 HISTORY OF CAROTID ENDARTERECTOMY: ICD-10-CM

## 2021-01-25 DIAGNOSIS — G45.9 TIA (TRANSIENT ISCHEMIC ATTACK): ICD-10-CM

## 2021-01-25 DIAGNOSIS — I65.23 BILATERAL CAROTID ARTERY STENOSIS: ICD-10-CM

## 2021-01-25 DIAGNOSIS — I48.0 PAROXYSMAL ATRIAL FIBRILLATION (HCC): ICD-10-CM

## 2021-01-25 PROCEDURE — 99213 OFFICE O/P EST LOW 20 MIN: CPT | Performed by: INTERNAL MEDICINE

## 2021-01-25 ASSESSMENT — ENCOUNTER SYMPTOMS
DIARRHEA: 0
SYNCOPE: 0
WEIGHT GAIN: 0
ALTERED MENTAL STATUS: 0
NEAR-SYNCOPE: 0
ORTHOPNEA: 0
IRREGULAR HEARTBEAT: 0
PND: 0
CONSTIPATION: 0
WEIGHT LOSS: 0
DIZZINESS: 0
CLAUDICATION: 0
DEPRESSION: 0
ABDOMINAL PAIN: 0
BACK PAIN: 0
VOMITING: 0
PALPITATIONS: 0
HEARTBURN: 0
DECREASED APPETITE: 0
SHORTNESS OF BREATH: 0
BLURRED VISION: 0
FEVER: 0
FLANK PAIN: 0
COUGH: 0
DYSPNEA ON EXERTION: 0
NAUSEA: 0

## 2021-01-25 NOTE — PATIENT INSTRUCTIONS
"Transient Ischemic Attack  A transient ischemic attack (TIA) is a \"warning stroke\" that causes stroke-like symptoms that then go away quickly. The symptoms of a TIA come on suddenly, and they last less than 24 hours. Unlike a stroke, a TIA does not cause permanent damage to the brain. It is important to know the symptoms of a TIA and what to do. Seek medical care right away, even if your symptoms go away.  Having a TIA is a sign that you are at higher risk for a permanent stroke. Lifestyle changes and medical treatments can help prevent a stroke.  What are the causes?  This condition is caused by a temporary blockage in an artery in the head or neck. The blockage does not allow the brain to get the blood supply it needs and can cause various symptoms. The blockage can be caused by:  · Fatty buildup in an artery in the head or neck (atherosclerosis).  · A blood clot.  · Tearing of an artery (dissection).  · Inflammation of an artery (vasculitis).  Sometimes the cause is not known.  What increases the risk?  Certain factors may make you more likely to develop this condition. Some of these factors are things that you can change, such as:  · Obesity.  · Using products that contain nicotine or tobacco, such as cigarettes and e-cigarettes.  · Taking oral birth control, especially if you also use tobacco.  · Lack of physical inactivity.  · Excessive use of alcohol.  · Use of drugs, especially cocaine and methamphetamine.  Other risk factors include:  · High blood pressure (hypertension).  · High cholesterol.  · Diabetes mellitus.  · Heart disease (coronary artery disease).  · Atrial fibrillation.  · Being  or .  · Being over the age of 60.  · Being male.  · Family history of stroke.  · Previous history of blood clots, stroke, TIA, or heart attack.  · Sickle cell disease.  · Being a woman with a history of preeclampsia.  · Migraine headache.  · Sleep apnea.  · Chronic inflammatory diseases, such as " rheumatoid arthritis or lupus.  · Blood clotting disorders (hypercoagulable state).  What are the signs or symptoms?  Symptoms of this condition are the same as those of a stroke, but they are temporary. The symptoms develop suddenly, and they go away quickly, usually within minutes to hours. Symptoms may include sudden:  · Weakness or numbness in your face, arm, or leg, especially on one side of your body.  · Trouble walking or difficulty moving your arms or legs.  · Trouble speaking, understanding speech, or both (aphasia).  · Vision changes in one or both eyes. These include double vision, blurred vision, or loss of vision.  · Dizziness.  · Confusion.  · Loss of balance or coordination.  · Nausea and vomiting.  · Severe headache with no known cause.  If possible, make note of the exact time that you last felt like your normal self and what time your symptoms started. Tell your health care provider.  How is this diagnosed?  This condition may be diagnosed based on:  · Your symptoms and medical history.  · A physical exam.  · Imaging tests, usually a CT or MRI scan of the brain.  · Blood tests.  You may also have other tests, including:  · Electrocardiogram (ECG).  · Echocardiogram.  · Carotid ultrasound.  · A scan of the brain circulation (CT angiogram or MRI angiogram).  · Continuous heart monitoring.  How is this treated?  The goal of treatment is to reduce the risk for a subsequent stroke. Treatment may include stroke prevention therapies such as:  · Changes to diet or lifestyle to decrease your risk. Lifestyle changes may include exercising and stopping smoking.  · Medicines to thin the blood (antiplatelets or anticoagulants).  · Blood pressure medicines.  · Medicines to reduce cholesterol.  · Treating other health conditions, such as diabetes or atrial fibrillation.  If testing shows that you have narrowing in the arteries to your brain, your health care provider may recommend a procedure, such as:  · Carotid  endarterectomy. This is a surgery to remove the blockage from your artery.  · Carotid angioplasty and stenting. This is a procedure to open or widen an artery in the neck using a metal mesh tube (stent). The stent helps keep the artery open by supporting the artery walls.  Follow these instructions at home:  Medicines    · Take over-the-counter and prescription medicines only as told by your health care provider.  · If you were told to take a medicine to thin your blood, such as aspirin or an anticoagulant, take it exactly as told by your health care provider.  ? Taking too much blood-thinning medicine can cause bleeding.  ? If you do not take enough blood-thinning medicine, you will not have the protection that you need against a stroke and other problems.  Eating and drinking

## 2021-01-25 NOTE — PROGRESS NOTES
Cardiology Note    Chief Complaint   Patient presents with   • Hypertension       History of Present Illness: Carl Blount is a 76 y.o. male PMH CAD/PCI LONG RCA 4/2018, L CEA c/b L ECA chronic dissection, CVA ischemic multiple / TIA, paroxysmal atrial fibrillation found on loop recorder, HLD, HTN, chronic R iliac dissection who presents to establish care after moving to [place].     Recounts last month felt aphasic for a few seconds while at the dinner table. Resolved without residual symptoms. No unilateral weakness. Did not seek medical assistance. No other cardiac complaints. Not limited in daily activities.     Review of Systems   Constitution: Negative for decreased appetite, fever, malaise/fatigue, weight gain and weight loss.   HENT: Negative for congestion and nosebleeds.    Eyes: Negative for blurred vision.   Cardiovascular: Negative for chest pain, claudication, dyspnea on exertion, irregular heartbeat, leg swelling, near-syncope, orthopnea, palpitations, paroxysmal nocturnal dyspnea and syncope.   Respiratory: Negative for cough and shortness of breath.    Endocrine: Negative for cold intolerance and heat intolerance.   Skin: Negative for rash.   Musculoskeletal: Negative for back pain.   Gastrointestinal: Negative for abdominal pain, constipation, diarrhea, heartburn, melena, nausea and vomiting.   Genitourinary: Negative for dysuria, flank pain and hematuria.   Neurological: Negative for dizziness.   Psychiatric/Behavioral: Negative for altered mental status and depression.         Past Medical History:   Diagnosis Date   • Hyperlipidemia    • Hypertension          History reviewed. No pertinent surgical history.      Current Outpatient Medications   Medication Sig Dispense Refill   • betamethasone dipropionate (DIPROLENE) 0.05 % Ointment Apply twice daily to rash areas of the arms and leg until the skin is smooth or up to 3 weeks, then stop 1 week. Repeat as prn. Avoid face or genitals. 45 g 2    • atorvastatin (LIPITOR) 80 MG tablet Take 1 Tab by mouth every bedtime. 90 Tab 3   • metoprolol SR (TOPROL XL) 25 MG TABLET SR 24 HR Take 1 Tab by mouth every day. 90 Tab 3   • triamcinolone acetonide (KENALOG) 0.1 % Ointment Apply twice daily to affected areas of body until the skin is smooth. Repeat as needed. Do not use on face or genitals. 454 g 1   • fluorouracil (EFUDEX) 5 % cream Twice daily to area on the SCALP for 2-4 weeks until red and scabbed. AVOID eyes. 1 Tube 1   • cetirizine (ZYRTEC) 10 MG Tab Take 10 mg by mouth every day.     • cyclobenzaprine (FLEXERIL) 10 MG Tab Take 10 mg by mouth 3 times a day as needed.     • hydroCHLOROthiazide (HYDRODIURIL) 25 MG Tab Take 25 mg by mouth every day.     • clobetasol (TEMOVATE) 0.05 % Cream Apply  to affected area(s) 2 times a day.     • lidocaine (LIDODERM) 5 % Patch Apply 1 Patch to skin as directed every 24 hours.     • lisinopril (PRINIVIL) 20 MG Tab Take 20 mg by mouth every day.     • LORazepam (ATIVAN) 1 MG Tab Take 0.5 mg by mouth every four hours as needed for Anxiety.     • pantoprazole (PROTONIX) 40 MG Tablet Delayed Response Take 40 mg by mouth every day.     • rivaroxaban (XARELTO) 20 MG Tab tablet Take 20 mg by mouth with dinner.     • tamsulosin (FLOMAX) 0.4 MG capsule Take 0.4 mg by mouth ONE-HALF HOUR AFTER BREAKFAST.     • zolpidem (AMBIEN) 5 MG Tab Take 10 mg by mouth at bedtime as needed for Sleep.       No current facility-administered medications for this visit.          No Known Allergies      History reviewed. No pertinent family history.      Social History     Socioeconomic History   • Marital status:      Spouse name: Not on file   • Number of children: Not on file   • Years of education: Not on file   • Highest education level: Not on file   Occupational History   • Not on file   Social Needs   • Financial resource strain: Not on file   • Food insecurity     Worry: Not on file     Inability: Not on file   • Transportation  "needs     Medical: Not on file     Non-medical: Not on file   Tobacco Use   • Smoking status: Former Smoker   • Smokeless tobacco: Never Used   Substance and Sexual Activity   • Alcohol use: Yes     Frequency: 2-3 times a week     Drinks per session: 1 or 2   • Drug use: Not on file   • Sexual activity: Not on file   Lifestyle   • Physical activity     Days per week: Not on file     Minutes per session: Not on file   • Stress: Not on file   Relationships   • Social connections     Talks on phone: Not on file     Gets together: Not on file     Attends L[employer] service: Not on file     Active member of club or organization: Not on file     Attends meetings of clubs or organizations: Not on file     Relationship status: Not on file   • Intimate partner violence     Fear of current or ex partner: Not on file     Emotionally abused: Not on file     Physically abused: Not on file     Forced sexual activity: Not on file   Other Topics Concern   • Not on file   Social History Narrative   • Not on file         Physical Exam:  Ambulatory Vitals  /64 (BP Location: Left arm, Patient Position: Sitting, BP Cuff Size: Adult)   Pulse 60   Ht 1.854 m (6' 1\")   Wt 105.7 kg (233 lb)   SpO2 95%    BP Readings from Last 4 Encounters:   01/25/21 110/64   12/03/20 126/72   10/26/20 102/60   07/24/20 140/72     Weight/BMI:   Vitals:    01/25/21 1305   BP: 110/64   Weight: 105.7 kg (233 lb)   Height: 1.854 m (6' 1\")    Body mass index is 30.74 kg/m².  Wt Readings from Last 4 Encounters:   01/25/21 105.7 kg (233 lb)   12/03/20 104.8 kg (231 lb)   10/26/20 105.2 kg (232 lb)   07/24/20 104 kg (229 lb 4.5 oz)       Physical Exam   Constitutional: He is oriented to person, place, and time and well-developed, well-nourished, and in no distress. No distress.   HENT:   Head: Normocephalic and atraumatic.   Eyes: Pupils are equal, round, and reactive to light. Conjunctivae are normal.   Neck: Normal range of motion. Neck supple. No JVD " present.   Cardiovascular: Normal rate, regular rhythm, normal heart sounds and intact distal pulses. Exam reveals no gallop and no friction rub.   No murmur heard.  Pulmonary/Chest: Effort normal and breath sounds normal. No respiratory distress. He has no wheezes. He has no rales. He exhibits no tenderness.   Abdominal: Soft. Bowel sounds are normal. He exhibits no distension.   Musculoskeletal:         General: No edema.   Neurological: He is alert and oriented to person, place, and time.   Skin: Skin is warm and dry.   Psychiatric: Affect and judgment normal.       Lab Data Review:  No results found for: CHOLSTRLTOT, LDL, HDL, TRIGLYCERIDE    No results found for: SODIUM, POTASSIUM, CHLORIDE, CO2, GLUCOSE, BUN, CREATININE, BUNCREATRAT, GLOMRATE  CrCl cannot be calculated (No successful lab value found.).  No results found for: ALKPHOSPHAT, ASTSGOT, ALTSGPT, TBILIRUBIN   No results found for: WBC, HCT  No results found for: HBA1C  No components found for: TROP    Outside labs 06/2020  Tot chol 128, trig 40, HDL 57, LDL 63  U/a wnl, a1c 5.7, cbc wnl, bmp wnl, micro alb / creat <3.6 mg/g wnl, TSH wnl    Cardiac Imaging and Procedures Review:      Carotid doppler 7/29/20  FINDINGS   RIGHT:   Moderate appearing calcific plaque of the bifurcation extending into the    internal carotid yet velocities are consistent with < 50% stenosis of the    internal carotid artery.    LEFT:   Patent internal carotid artery with no hemodynamically significant plaque    following endarterectomy.   Bilateral subclavian and vertebral artery waveforms are antegrade and    waveforms are normal in character and velocity.     [facility] 2/2018 outside study  1. Quality of study: good.  2. There s is TID.  3. The LV volumes are enlarged..  4. SPECT images demonstrate infarct of the inferolateral wall with minor ama-infarct ischemia.  5. The gate stress LVEF is 53%    [facility] 04/2018 outside study  Procedure Performed:  1. R femoral artery access  2.  Left heart catheterization with selective coronary angiography  3. PCI ostial RCA - XIENCE 2.25 x 12mm post dilated NC trek 2.5 x 12 to 20 narciso  4. Perclose R CFA  Summary of Findings:  1. 99.9% ostial RCA  2. Left main no disease  3. LAD minimal disease  4. LCx no disease  5. LVEDP 8mmHg  6. Chronic dissection R external iliac artery dual lumen    Carotid artery ultrasound 2013 [facility] outside study  1.  Compared to carotid arterial ultrasonography performed 2/15/2012-     a)   Left carotid arterial bifurcation has returned to normal  appearance status post endarterectomy.     b)   Mild-moderate degree right carotid bulb and proximal right  internal carotid arterial plaque without evidence of hemodynamically  significant disease is present and not substantially changed.    c)  There remains normal antegrade flow within the cervical right  and left vertebral arteries.    CTA neck s/p CEA 2012  1. Postoperative change compatible with interval left carotid  endarterectomy. The left common and internal carotid arteries appear  patent along their course.  2. Focal dissection flap is present within the proximal left external  carotid artery, without evidence of distal occlusion.  3. Stable atherosclerotic disease and stenosis of the right common  and internal carotid arteries, as described above.  4. Stable atherosclerotic disease of the origin of the right  vertebral artery.    TTE 2012  Interpretation Summary  1.  Negative contrast study for interatrial shunt. 2.    Mild LVH with normal LV size and systolic function. No   significant valvular abnormality noted. 3.  No prior   study for comparison.    Medical Decision Making:  Problem List Items Addressed This Visit     Essential hypertension    Relevant Orders    REFERRAL TO FOLLOW-UP WITH PRIMARY CARE    Bilateral carotid artery stenosis    Relevant Orders    CT-CTA HEAD AND NECK W W/O POST PROCESS    Coronary artery disease involving native heart without angina  pectoris    History of cardioembolic cerebrovascular accident (CVA)    Relevant Orders    REFERRAL TO FOLLOW-UP WITH PRIMARY CARE    Mixed hyperlipidemia    Relevant Orders    LIPID PANEL    REFERRAL TO FOLLOW-UP WITH PRIMARY CARE    History of carotid endarterectomy    Iliac dissection (HCC) history    Relevant Orders    CTA ABDOMEN PELVIS W & W/O POST PROCESS    Paroxysmal atrial fibrillation (HCC)    Relevant Orders    REFERRAL TO FOLLOW-UP WITH PRIMARY CARE    TIA (transient ischemic attack)    Relevant Orders    REFERRAL TO NEUROLOGY        CAD / HLD / hx CVA - TIA-like symptom although from description nonspecific. Refer to neurology. Repeat carotid CTA. Continue doac. No additional antiplatelet needed. Continue statin. LDL at goal <70.    Paroxysmal AF - chadsvasc 6 - continue xarelto for cva prevention. Continue metoprolol for rate control.     HTN - controlled. Goal 120/80. Continue current regimen.    Carotid / iliac chronic dissections - repeat CTA.     It was my pleasure to meet with Mr. Blount.

## 2021-01-27 ENCOUNTER — PATIENT MESSAGE (OUTPATIENT)
Dept: CARDIOLOGY | Facility: MEDICAL CENTER | Age: 76
End: 2021-01-27

## 2021-01-27 DIAGNOSIS — I10 ESSENTIAL HYPERTENSION: ICD-10-CM

## 2021-01-27 DIAGNOSIS — I25.10 CORONARY ARTERY DISEASE INVOLVING NATIVE HEART WITHOUT ANGINA PECTORIS, UNSPECIFIED VESSEL OR LESION TYPE: ICD-10-CM

## 2021-01-27 DIAGNOSIS — G45.9 TIA (TRANSIENT ISCHEMIC ATTACK): ICD-10-CM

## 2021-01-28 NOTE — PATIENT COMMUNICATION
You  Juice Davis M.D. Yesterday (2:45 PM)     Your last couple notes mention that pt is pending GI scopes, and that bridging will be needed when he does complete. Does he need to complete the CTA head and CTA abdomen prior to proceeding?   Thanks!      Juice Davis M.D.  You 12 minutes ago (3:10 PM)     Yea, lets get the CT scans first. He had some TIA symptoms. Sounds like scope is elective and can delay. If they need the full 3 days then would bridge with lovenox. If they are ok with just one day off doac then no need. Thanks Melissa!

## 2021-01-29 ENCOUNTER — PATIENT MESSAGE (OUTPATIENT)
Dept: CARDIOLOGY | Facility: MEDICAL CENTER | Age: 76
End: 2021-01-29

## 2021-01-29 NOTE — PATIENT COMMUNICATION
Called pt. He was concerned because he thought 2/5 was next Tuesday and he wasn't going to have enough time to complete the labs prior. He now realizes that 2/5 is next Friday and he has all next week to complete the labs.

## 2021-01-29 NOTE — PATIENT COMMUNICATION
FW: LABS  Received: Today  Message Contents   TRINITY Monroy R.N. Hey Michaela, can we add BMP for this patient? Thank you!    Previous Messages    ----- Message -----   From: Kerrie Cameron   Sent: 1/27/2021   4:32 PM PST   To: Juice Davis M.D.   Subject: LABS                                             Hello,     Patient needs creatinine levels for the CTA and CT. Please submit a lab request for the patient     Thank you

## 2021-02-02 ENCOUNTER — HOSPITAL ENCOUNTER (OUTPATIENT)
Dept: LAB | Facility: MEDICAL CENTER | Age: 76
End: 2021-02-02
Attending: INTERNAL MEDICINE
Payer: MEDICARE

## 2021-02-02 ENCOUNTER — PATIENT MESSAGE (OUTPATIENT)
Dept: CARDIOLOGY | Facility: MEDICAL CENTER | Age: 76
End: 2021-02-02

## 2021-02-02 DIAGNOSIS — I25.10 CORONARY ARTERY DISEASE INVOLVING NATIVE HEART WITHOUT ANGINA PECTORIS, UNSPECIFIED VESSEL OR LESION TYPE: ICD-10-CM

## 2021-02-02 DIAGNOSIS — G45.9 TIA (TRANSIENT ISCHEMIC ATTACK): ICD-10-CM

## 2021-02-02 DIAGNOSIS — I10 ESSENTIAL HYPERTENSION: ICD-10-CM

## 2021-02-02 DIAGNOSIS — E78.2 MIXED HYPERLIPIDEMIA: ICD-10-CM

## 2021-02-02 PROCEDURE — 80048 BASIC METABOLIC PNL TOTAL CA: CPT

## 2021-02-02 PROCEDURE — 36415 COLL VENOUS BLD VENIPUNCTURE: CPT

## 2021-02-02 PROCEDURE — 80061 LIPID PANEL: CPT

## 2021-02-03 LAB
ANION GAP SERPL CALC-SCNC: 8 MMOL/L (ref 7–16)
BUN SERPL-MCNC: 15 MG/DL (ref 8–22)
CALCIUM SERPL-MCNC: 8.6 MG/DL (ref 8.5–10.5)
CHLORIDE SERPL-SCNC: 100 MMOL/L (ref 96–112)
CHOLEST SERPL-MCNC: 103 MG/DL (ref 100–199)
CO2 SERPL-SCNC: 28 MMOL/L (ref 20–33)
CREAT SERPL-MCNC: 0.8 MG/DL (ref 0.5–1.4)
FASTING STATUS PATIENT QL REPORTED: NORMAL
GLUCOSE SERPL-MCNC: 135 MG/DL (ref 65–99)
HDLC SERPL-MCNC: 53 MG/DL
LDLC SERPL CALC-MCNC: 37 MG/DL
POTASSIUM SERPL-SCNC: 3.8 MMOL/L (ref 3.6–5.5)
SODIUM SERPL-SCNC: 136 MMOL/L (ref 135–145)
TRIGL SERPL-MCNC: 66 MG/DL (ref 0–149)

## 2021-02-04 RX ORDER — ATORVASTATIN CALCIUM 80 MG/1
40 TABLET, FILM COATED ORAL
Qty: 90 TAB | Refills: 3 | COMMUNITY
Start: 2021-02-04 | End: 2021-06-18 | Stop reason: SDUPTHER

## 2021-02-05 ENCOUNTER — HOSPITAL ENCOUNTER (OUTPATIENT)
Dept: RADIOLOGY | Facility: MEDICAL CENTER | Age: 76
End: 2021-02-05
Attending: INTERNAL MEDICINE
Payer: MEDICARE

## 2021-02-05 DIAGNOSIS — I65.23 BILATERAL CAROTID ARTERY STENOSIS: ICD-10-CM

## 2021-02-05 PROCEDURE — 70496 CT ANGIOGRAPHY HEAD: CPT

## 2021-02-05 PROCEDURE — 70498 CT ANGIOGRAPHY NECK: CPT

## 2021-02-05 PROCEDURE — 700117 HCHG RX CONTRAST REV CODE 255: Performed by: INTERNAL MEDICINE

## 2021-02-05 RX ADMIN — IOHEXOL 80 ML: 350 INJECTION, SOLUTION INTRAVENOUS at 12:14

## 2021-02-08 ENCOUNTER — TELEPHONE (OUTPATIENT)
Dept: CARDIOLOGY | Facility: MEDICAL CENTER | Age: 76
End: 2021-02-08

## 2021-02-08 DIAGNOSIS — Z98.890 HISTORY OF CAROTID ENDARTERECTOMY: ICD-10-CM

## 2021-02-08 DIAGNOSIS — I65.23 BILATERAL CAROTID ARTERY STENOSIS: ICD-10-CM

## 2021-02-08 NOTE — TELEPHONE ENCOUNTER
CT-CTA NECK WITH & W/O-POST PROCESSING    [employer] Released Result Comments  Written by Juice Davis M.D. on 2/8/2021 10:30 AM  Mr Blount,   Attempted to call you. Your carotid and vertebral disease appears to have progressed. Will work on setting you up with neurology.   Best,   Dr Ryan Davis M.D.  MIGEL Tyson,   Looks like I previously referred to neurology. Would you mind calling our [facility] department and accelerate his visit?   Thank you!      Called [facility]  at [phone]. The soonest available appt they have is 3/4 at 11am. They scheduled pt for this appt, and if he needs to reschedule or has questions he can call [phone]. She says this is unfortunately the soonest available they have, and it wouldn't make a difference if we changed the referral to urgent.     Called and s/w pt and his wife. Notified of progressing vertebral and carotid disease. Notified of neurology appt on 3/4 and provided with number if they need to reschedule. They request that VR try to call them again when possible. They are currently in the car and will be home in about 20 mins.

## 2021-02-08 NOTE — TELEPHONE ENCOUNTER
Juice Davis M.D.  You 41 minutes ago (12:55 PM)     Ok, thanks Melissa! Spoke to patient about the CT scans.      Juice Davis M.D.  You 18 minutes ago (1:18 PM)     Actually lets have him see vascular surgery too for good measure. Patient said that wouldn't upset him at all. Thanks Melissa!      Vascular surgery referral placed

## 2021-02-22 DIAGNOSIS — I10 ESSENTIAL HYPERTENSION: ICD-10-CM

## 2021-02-22 RX ORDER — METOPROLOL SUCCINATE 25 MG/1
25 TABLET, EXTENDED RELEASE ORAL DAILY
Qty: 90 TABLET | Refills: 3 | Status: SHIPPED | OUTPATIENT
Start: 2021-02-22 | End: 2021-11-29

## 2021-02-22 NOTE — PROGRESS NOTES
Chief Complaint   Patient presents with   • New Patient     stroke       Problem List Items Addressed This Visit     Essential hypertension    History of carotid endarterectomy    Paroxysmal atrial fibrillation (HCC)      Other Visit Diagnoses     Transient neurological symptoms        Relevant Orders    MR-BRAIN-W/O    EC-ECHOCARDIOGRAM COMPLETE W/ CONT    CBC WITH DIFFERENTIAL    HEMOGLOBIN A1C    History of stroke        Relevant Orders    MR-BRAIN-W/O    EC-ECHOCARDIOGRAM COMPLETE W/ CONT    CBC WITH DIFFERENTIAL    HEMOGLOBIN A1C    Altered glucose metabolism        Relevant Orders    HEMOGLOBIN A1C          History of present illness:  Carl Blount 76 y.o. male presents today with wife, Lacie,  for stroke bridge f/u. Referred by cardiology. PMHx: CAD with stent placement, HTN, PAF on xarelto, HLD, hx of stroke in 2012 s/p L CEA    Had one sx of transient aphasia lasting ~15min in Dec 2020 but did not get any evaluation done then. No other stroke like symptoms. Vessel imaging were repeated by cardiology. He is also seeing his PCP regularly but is now looking for another PCP as his provider in [place] left the practice. They report he had a stroke in 2012 and had CEA. He was also found to have PAF and was started on xarelto. No side effects. He does not have residual deficit besides from mild cognitive impairment. He has been compliant. They also reported that he was on plavix until last year when cardiology took him off of it. Used to bruise easily on combination of xarelto and plavix but no bleeding.  They report he had seen vascular surgeon regarding his recent vessel imaging and he was told continue with medical management and will repeat imaging next year. He hasn't had MRI brain done.     No family hx of stroke. No hx of blood clots.    Hx of smoking for over 20 years. Quit in 2006. He drinks alcohol daily (a couple cocktails per pt).      Past medical history:   Past Medical History:    Diagnosis Date   • Hyperlipidemia    • Hypertension        Past surgical history:   History reviewed. No pertinent surgical history.    Family history:   History reviewed. No pertinent family history.    Social history:   Social History     Socioeconomic History   • Marital status:      Spouse name: Not on file   • Number of children: Not on file   • Years of education: Not on file   • Highest education level: Not on file   Occupational History   • Not on file   Tobacco Use   • Smoking status: Former Smoker   • Smokeless tobacco: Never Used   Substance and Sexual Activity   • Alcohol use: Yes   • Drug use: Not on file   • Sexual activity: Not on file   Other Topics Concern   • Not on file   Social History Narrative   • Not on file     Social Determinants of Health     Financial Resource Strain:    • Difficulty of Paying Living Expenses:    Food Insecurity:    • Worried About Running Out of Food in the Last Year:    • Ran Out of Food in the Last Year:    Transportation Needs:    • Lack of Transportation (Medical):    • Lack of Transportation (Non-Medical):    Physical Activity:    • Days of Exercise per Week:    • Minutes of Exercise per Session:    Stress:    • Feeling of Stress :    Social Connections:    • Frequency of Communication with Friends and Family:    • Frequency of Social Gatherings with Friends and Family:    • Attends Taoist Services:    • Active Member of Clubs or Organizations:    • Attends Club or Organization Meetings:    • Marital Status:    Intimate Partner Violence:    • Fear of Current or Ex-Partner:    • Emotionally Abused:    • Physically Abused:    • Sexually Abused:        Current medications:   Current Outpatient Medications   Medication   • metoprolol SR (TOPROL XL) 25 MG TABLET SR 24 HR   • atorvastatin (LIPITOR) 80 MG tablet   • betamethasone dipropionate (DIPROLENE) 0.05 % Ointment   • triamcinolone acetonide (KENALOG) 0.1 % Ointment   • fluorouracil (EFUDEX) 5 % cream   •  "cetirizine (ZYRTEC) 10 MG Tab   • cyclobenzaprine (FLEXERIL) 10 MG Tab   • hydroCHLOROthiazide (HYDRODIURIL) 25 MG Tab   • clobetasol (TEMOVATE) 0.05 % Cream   • lidocaine (LIDODERM) 5 % Patch   • lisinopril (PRINIVIL) 20 MG Tab   • LORazepam (ATIVAN) 1 MG Tab   • pantoprazole (PROTONIX) 40 MG Tablet Delayed Response   • rivaroxaban (XARELTO) 20 MG Tab tablet   • tamsulosin (FLOMAX) 0.4 MG capsule   • zolpidem (AMBIEN) 5 MG Tab     No current facility-administered medications for this visit.       Medication Allergy:  No Known Allergies    Review of systems:   General: Denies fevers or chills, or nightsweats, or generalized fatigue.    Head: Denies headaches or dizziness or lightheadedness  EENT: Denies vision changes, vision loss or pain, nasal secretion, nasal bleeding, difficulty swallowing, hearing loss, tinnitus, vertigo, ear pain  Respiratory: Denies shortness of breath, cough, sputum, or wheezing  Cardiac: Denies chest pain, palpitations, edema or syncope  Gastrointestinal: Denies nausea, vomiting, no abdominal pain or change in bowel habits, no melena or hematochezia  Urinary: Denies dysuria, frequency, hesitancy, or incontinence.  Dermatologic:  Denies new rash  Musculoskeletal: Denies muscle pain or swelling, no atrophy, no neck and back pain or stiffness.   Neurologic: Denies facial droopiness, muscle weakness (focal or generalized), paresthesias, ataxia,  memory loss, abnormal movements, seizures, loss of consciousness, or episodes of confusion. +transient aphasia  Psychiatric: Denies anxiety, depression, mood swings, suicidal or homicidal thoughts       Physical examination:   Vitals:    03/04/21 1046   BP: 118/62   BP Location: Right arm   Patient Position: Sitting   BP Cuff Size: Adult   Pulse: 87   Temp: 36.4 °C (97.5 °F)   TempSrc: Temporal   SpO2: 96%   Weight: 106 kg (234 lb 5.6 oz)   Height: 1.854 m (6' 1\")     General: Patient in no acute distress, pleasant and cooperative.  HEENT: " Normocephalic, no signs of acute trauma.   moist conjunctivae. Nares are patent. Oropharynx clear without lesions and normal  hard and soft palates.   Neck: Supple, No carotid bruits. There is normal range of motion.   Resp: clear to auscultation bilaterally. No wheezes or crackles.   CV: RRR, no murmurs.   Skin: no signs of acute rashes or trauma.   Musculoskeletal: joints exhibit full range of motion  Psychiatric: No hallucinatory behavior. No symptoms of depression or suicidal ideation. Mood and affect appear normal on exam.     NEUROLOGICAL EXAM:   Mental status, orientation: Awake, alert and fully oriented.   Speech and language: speech is clear and fluent. The patient is able to name, repeat and comprehend.   Memory: There is intact recollection of recent and remote events.   Cranial nerve exam:   CN I: Not examined   CN II: PERRL.  CN III, IV, VI: EOMI; no nystagmus   CN V: Facial sensation intact bilaterally   CN VII: face symmetric   CN VIII: hearing intact to finger rub bilaterally   CN IX, X: palate elevates symmetrically   CN XI: Symmetric shoulder shrug  CN XII: tongue midline. No signs of tongue biting or fasciculations   Motor exam: Strength is 5/5 in all extremities. Tone is normal. No abnormal movements were seen on exam.   Sensory exam reveals normal sense of light touch in all extremities.   Deep tendon reflexes:  2+ throughout. Plantar responses are flexor. There is no clonus.   Coordination: shows a normal finger-nose-finger. Normal rapidly alternating movements.   Gait: The patient was able to get up from seated position on first attempt without requiring assistance. Found to be steady when walking. Movements were fluid with normal arm swing. The patient was able to turn without difficulties or tendency to fall. Romberg exam mildly       ANCILLARY DATA REVIEWED:     Lab Data Review:  Reviewed in chart.     Records reviewed:  Reviewed in chart.    Imaging:  CTA head 2/5/21  1.  Moderate chronic  left MCA territory infarct with asymmetrically small M2 segment left MCA branch.  2.  No large vessel occlusion or aneurysm.    CTA neck 2/5/21  1.  Severe proximal/mid right internal carotid artery calcified plaque with 65-70% diameter stenosis.  2.  No significant left carotid artery stenosis.  3.  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  4.  Severe stenosis at the origin of the right vertebral artery.  5.  Moderate proximal left vertebral artery stenosis.      ASSESSMENT AND PLAN:    1. Transient neurological symptoms  - MR-BRAIN-W/O; Future  - EC-ECHOCARDIOGRAM COMPLETE W/ CONT; Future  - CBC WITH DIFFERENTIAL; Future  - HEMOGLOBIN A1C; Future    2. History of stroke  - MR-BRAIN-W/O; Future  - EC-ECHOCARDIOGRAM COMPLETE W/ CONT; Future  - CBC WITH DIFFERENTIAL; Future  - HEMOGLOBIN A1C; Future    3. Altered glucose metabolism  - HEMOGLOBIN A1C; Future    4. Paroxysmal atrial fibrillation (HCC)    5. Essential hypertension    6. History of carotid endarterectomy    ABCD2 Score  Age ?60 years   No = 0  Yes = +1 1   BP ? 140/90 mm/hg (initial either SBP ?140 or DBP ?90         No = 0     Yes = +1 0   Clinical Features of the TIA        Unilateral weakness +2        Speech disturbance w/o weakness +1        Other Symptoms   = 0 1   Durations of symptoms:        < 10 minutes    =    0         10-59 minutes =   +1         ?60 minutes    =    +2 1   History of diabetes              No   =     0               Yes =  +1 0   Total Score 3         CLINICAL DECISION:  One episode of aphasia in Dec 2020 lasting ~15min (recrudescence vs TIA). Vessel imaging showed severe proximal/mid right internal carotid artery calcified plaque with 65-70% diameter stenosis, severe stenosis at the origin of the right vertebral artery and moderate proximal left vertebral artery stenosis. Also noted moderate chronic L MCA  infarct likely related to hx L ICA stenosis s/p CEA. He did see vascular surgeon and no recommendation for surgical intervention for now. They are considering getting a second opinion regarding this. He is currently just on xarelto for PAF. Used to be on both xarelto and plavix but plavix was d/c'd by cardiology. He may need to be on antiplatelet given his atherosclerosis and they are aware aware of risk of bleeding on the combination. Will defer this to vascular. We have discussed risk factors including HTN (controlled), HLD (controlled), PAF on xarelto, CAD and altered glucose metabolism.     Plan:  - MRI brain and ECHO for stroke work-up.    - Lab work- CBC, A1c    -Continue current medications as prescribed by his providers. Pt and wife will clarify with cardiology as to why he was taken off plavix.     -Education given to help control risk factors including:  /80, LDL 70 or less, HBA1C 7 or less.     -Needs close fu with PCP for optimal control of risk factors.     -Discussed lifestyle modification including diet, exercise and weight loss.  Limiting intake of sugar and carbohydrates. Discussed alcohol cessation but not to stop abruptly.         FOLLOW-UP:   Return in about 2 weeks (around 3/18/2021).        EDUCATION AND COUNSELING:  -Usefulness of anti-platelets in secondary stroke prevention was discussed. The side effects, including increased risk for bleeding (both traumatic and spontaneous) were reviewed with the patient at length.   -Recommended goal for LDL is 70 or less. Side effects of statin, including idiosyncratic reactions as well as more common myalgias, and liver injury  were discussed. Monitoring of LFT’s will be deferred to the patient’s primary care physician.   -Secondary stroke prevention education was provided, including; lifestyle modification with healthy diet, and exercise, as well as recommendations for optimal control of risk factors.   -Close follow up with PCP is recommended.    -Compliance with medications was discussed in detail.   -Smoking cessation education was provided for greater than 3 minutes.   -The patient/family was educated on recognition of stroke symptoms. The patient/family instructed to call 911 EMERGENTLY upon presentation of any of these symptoms/signs, to be taken to nearest emergency department for evaluation and acute care.       The patient understands and agrees that due to the complexity of his/her diagnosis, results of any testing and further recommendations will typically be discussed/made during a face to face encounter in my office. The patient and/or family further understands it is their responsibility to keep proper follow up.         Alla Batista, MSN, APRN, FNP-C  [facility]  Office: [phone]  Fax: [phone]

## 2021-03-04 ENCOUNTER — OFFICE VISIT (OUTPATIENT)
Dept: NEUROLOGY | Facility: MEDICAL CENTER | Age: 76
End: 2021-03-04
Attending: NURSE PRACTITIONER
Payer: MEDICARE

## 2021-03-04 VITALS
HEIGHT: 73 IN | TEMPERATURE: 97.5 F | SYSTOLIC BLOOD PRESSURE: 118 MMHG | DIASTOLIC BLOOD PRESSURE: 62 MMHG | HEART RATE: 87 BPM | OXYGEN SATURATION: 96 % | WEIGHT: 234.35 LBS | BODY MASS INDEX: 31.06 KG/M2

## 2021-03-04 DIAGNOSIS — I48.0 PAROXYSMAL ATRIAL FIBRILLATION (HCC): ICD-10-CM

## 2021-03-04 DIAGNOSIS — Z86.73 HISTORY OF STROKE: ICD-10-CM

## 2021-03-04 DIAGNOSIS — I70.90 ATHEROSCLEROSIS: ICD-10-CM

## 2021-03-04 DIAGNOSIS — R29.818 TRANSIENT NEUROLOGICAL SYMPTOMS: ICD-10-CM

## 2021-03-04 DIAGNOSIS — E66.9 OBESITY (BMI 30-39.9): ICD-10-CM

## 2021-03-04 DIAGNOSIS — R73.09 ALTERED GLUCOSE METABOLISM: ICD-10-CM

## 2021-03-04 DIAGNOSIS — Z98.890 HISTORY OF CAROTID ENDARTERECTOMY: ICD-10-CM

## 2021-03-04 DIAGNOSIS — I10 ESSENTIAL HYPERTENSION: ICD-10-CM

## 2021-03-04 PROCEDURE — 99214 OFFICE O/P EST MOD 30 MIN: CPT | Performed by: NURSE PRACTITIONER

## 2021-03-04 PROCEDURE — 99212 OFFICE O/P EST SF 10 MIN: CPT | Performed by: NURSE PRACTITIONER

## 2021-03-04 ASSESSMENT — PATIENT HEALTH QUESTIONNAIRE - PHQ9: CLINICAL INTERPRETATION OF PHQ2 SCORE: 0

## 2021-03-09 ENCOUNTER — PATIENT MESSAGE (OUTPATIENT)
Dept: CARDIOLOGY | Facility: MEDICAL CENTER | Age: 76
End: 2021-03-09

## 2021-03-15 NOTE — PROGRESS NOTES
Chief Complaint   Patient presents with   • Follow-Up     Transient neurological symptoms       Problem List Items Addressed This Visit     History of carotid endarterectomy    Paroxysmal atrial fibrillation (HCC)      Other Visit Diagnoses     History of stroke        Relevant Medications    clopidogrel (PLAVIX) 75 MG Tab    Atherosclerosis        Relevant Medications    clopidogrel (PLAVIX) 75 MG Tab    Altered glucose metabolism              Interim History:  Carl Blount 76 y.o. male presents today with wife for f/u.     They report they have been having issues with getting f/u appointment and getting back from Dr. Turpin, vascular. They report that cardiology has approved to restart plavix but not long term. They have not pursued 2nd opinion with another vascular surgeon. He has been stable and has had no other stroke like symptoms. He is still drinking alcohol but has cut back per wife. He was not able to get the MRI done here as he has trouble laying flat. His Echo is pending in April.       Past medical history:   Past Medical History:   Diagnosis Date   • Hyperlipidemia    • Hypertension        Past surgical history:   History reviewed. No pertinent surgical history.    Family history:   History reviewed. No pertinent family history.    Social history:   Social History     Socioeconomic History   • Marital status:      Spouse name: Not on file   • Number of children: Not on file   • Years of education: Not on file   • Highest education level: Not on file   Occupational History   • Not on file   Tobacco Use   • Smoking status: Former Smoker   • Smokeless tobacco: Never Used   Substance and Sexual Activity   • Alcohol use: Yes   • Drug use: Not on file   • Sexual activity: Not on file   Other Topics Concern   • Not on file   Social History Narrative   • Not on file     Social Determinants of Health     Financial Resource Strain:    • Difficulty of Paying Living Expenses:    Food Insecurity:     • Worried About Running Out of Food in the Last Year:    • Ran Out of Food in the Last Year:    Transportation Needs:    • Lack of Transportation (Medical):    • Lack of Transportation (Non-Medical):    Physical Activity:    • Days of Exercise per Week:    • Minutes of Exercise per Session:    Stress:    • Feeling of Stress :    Social Connections:    • Frequency of Communication with Friends and Family:    • Frequency of Social Gatherings with Friends and Family:    • Attends Scientologist Services:    • Active Member of Clubs or Organizations:    • Attends Club or Organization Meetings:    • Marital Status:    Intimate Partner Violence:    • Fear of Current or Ex-Partner:    • Emotionally Abused:    • Physically Abused:    • Sexually Abused:        Current medications:   Current Outpatient Medications   Medication   • metoprolol SR (TOPROL XL) 25 MG TABLET SR 24 HR   • atorvastatin (LIPITOR) 80 MG tablet   • betamethasone dipropionate (DIPROLENE) 0.05 % Ointment   • triamcinolone acetonide (KENALOG) 0.1 % Ointment   • fluorouracil (EFUDEX) 5 % cream   • cetirizine (ZYRTEC) 10 MG Tab   • cyclobenzaprine (FLEXERIL) 10 MG Tab   • hydroCHLOROthiazide (HYDRODIURIL) 25 MG Tab   • clobetasol (TEMOVATE) 0.05 % Cream   • lidocaine (LIDODERM) 5 % Patch   • lisinopril (PRINIVIL) 20 MG Tab   • LORazepam (ATIVAN) 1 MG Tab   • pantoprazole (PROTONIX) 40 MG Tablet Delayed Response   • rivaroxaban (XARELTO) 20 MG Tab tablet   • tamsulosin (FLOMAX) 0.4 MG capsule   • zolpidem (AMBIEN) 5 MG Tab     No current facility-administered medications for this visit.       Medication Allergy:  No Known Allergies    Review of systems:   General: Denies fevers or chills, or nightsweats, or generalized fatigue.    Head: Denies headaches or dizziness or lightheadedness  EENT: Denies vision changes, vision loss or pain, nasal secretion, nasal bleeding, difficulty swallowing, hearing loss, tinnitus, vertigo, ear pain  Neurologic: Denies facial  droopiness, muscle weakness (focal or generalized), paresthesias, ataxia, change in speech or language, memory loss, abnormal movements, seizures, loss of consciousness, or episodes of confusion.   Psychiatric: Denies anxiety, depression, mood swings, suicidal or homicidal thoughts       Physical examination:   Vitals:    03/25/21 1334   BP: 138/68   BP Location: Right arm   Patient Position: Sitting   BP Cuff Size: Adult   Pulse: 62   Resp: 16   Temp: (!) 35.8 °C (96.4 °F)   TempSrc: Temporal   SpO2: 94%   Weight: 103 kg (227 lb 1.2 oz)     General: Patient in no acute distress, pleasant and cooperative.  HEENT: Normocephalic, no signs of acute trauma.   Neck: Supple, No carotid bruits. There is normal range of motion.   Resp: clear to auscultation bilaterally. No wheezes or crackles.   CV: RRR, no murmurs.   Skin: no signs of acute rashes or trauma.   Musculoskeletal: joints exhibit full range of motion  Psychiatric: No hallucinatory behavior. No symptoms of depression or suicidal ideation. Mood and affect appear normal on exam.     NEUROLOGICAL EXAM:   Mental status, orientation: Awake, alert and fully oriented.   Speech and language: speech is clear and fluent. The patient is able to name, repeat and comprehend.   Memory: There is intact recollection of recent and remote events.   Cranial nerve exam:   CN I: Not examined   CN II: PERRL. Fundoscopic exam was unremarkable.  CN III, IV, VI: EOMI; no nystagmus   CN V: Facial sensation intact bilaterally   CN VII: face symmetric   CN VIII: hearing intact to finger rub bilaterally   CN IX, X: palate elevates symmetrically   CN XI: Symmetric shoulder shrug  CN XII: tongue midline. No signs of tongue biting or fasciculations   Motor exam: Strength is 5/5 in all extremities. Tone is normal. No abnormal movements were seen on exam.   Sensory exam reveals normal sense of light touch in all extremities.   Gait: The patient was able to get up from seated position on first  attempt without requiring assistance. Found to be steady when walking.     ANCILLARY DATA REVIEWED:     Lab Data Review:  Reviewed in chart. CBC, a1c    Records reviewed:  Reviewed in chart.    Imaging:  CTA head 2/5/21  1.  Moderate chronic left MCA territory infarct with asymmetrically small M2 segment left MCA branch.  2.  No large vessel occlusion or aneurysm.     CTA neck 2/5/21  1.  Severe proximal/mid right internal carotid artery calcified plaque with 65-70% diameter stenosis.  2.  No significant left carotid artery stenosis.  3.  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  4.  Severe stenosis at the origin of the right vertebral artery.  5.  Moderate proximal left vertebral artery stenosis.      ASSESSMENT AND PLAN:    1. History of stroke  - clopidogrel (PLAVIX) 75 MG Tab; Take 1 tablet by mouth every day.  Dispense: 30 tablet; Refill: 0    2. Atherosclerosis  - clopidogrel (PLAVIX) 75 MG Tab; Take 1 tablet by mouth every day.  Dispense: 30 tablet; Refill: 0    3. Altered glucose metabolism    4. Paroxysmal atrial fibrillation (HCC)    5. History of carotid endarterectomy    6. Carotid stenosis, right          CLINICAL DECISION:  One episode of aphasia in Dec 2020 lasting ~15min (recrudescence vs TIA). Vessel imaging showed severe proximal/mid right internal carotid artery calcified plaque with 65-70% diameter stenosis, severe stenosis at the origin of the right vertebral artery and moderate proximal left vertebral artery stenosis. Also noted moderate chronic L MCA infarct likely related to hx L ICA stenosis s/p CEA. He did see vascular surgeon and no recommendation for surgical intervention for now. They are considering getting a second opinion regarding this. He is currently on xarelto for PAF. Used to be on both xarelto and plavix but plavix was d/c'd by cardiology. He may need to be on  antiplatelet given his atherosclerosis and they are aware aware of risk of bleeding on the combination. Discuss caution from falls. Cardiology has agreed to restart plavix but they have not heard back from vascular. Pt had requested plavix refill while they try to schedule an appointment with vascular.  We have discussed risk factors including HTN (controlled), HLD (controlled), PAF on xarelto, CAD and altered glucose metabolism (recent A1c 6.1)     Plan:  - MRI brain and ECHO for stroke work-up. They will look for open MRI for now. - Echo pending.      -Given 30 day refill of plavix until they can see vascular. Discussed bleeding risk especially that pt also drinks alcohol.      -Education given to help control risk factors including:  /80, LDL 70 or less, HBA1C 7 or less.      -Needs close fu with PCP for optimal control of risk factors.      -Discussed lifestyle modification including diet, exercise and weight loss.  Limiting intake of sugar and carbohydrates. Discussed alcohol cessation but not to stop abruptly.       FOLLOW-UP:   Return in about 2 months (around 5/25/2021).        EDUCATION AND COUNSELING:  -Usefulness of anti-platelets in secondary stroke prevention was discussed. The side effects, including increased risk for bleeding (both traumatic and spontaneous) were reviewed with the patient at length.   -Recommended goal for LDL is 70 or less. Side effects of statin, including idiosyncratic reactions as well as more common myalgias, and liver injury  were discussed. Monitoring of LFT’s will be deferred to the patient’s primary care physician.   -Secondary stroke prevention education was provided, including; lifestyle modification with healthy diet, and exercise, as well as recommendations for optimal control of risk factors.   -Close follow up with PCP is recommended.   -Compliance with medications was discussed in detail.   -Smoking cessation education was provided for greater than 3 minutes.    -The patient/family was educated on recognition of stroke symptoms. The patient/family instructed to call 911 EMERGENTLY upon presentation of any of these symptoms/signs, to be taken to nearest emergency department for evaluation and acute care.       The patient understands and agrees that due to the complexity of his/her diagnosis, results of any testing and further recommendations will typically be discussed/made during a face to face encounter in my office. The patient and/or family further understands it is their responsibility to keep proper follow up.         Alla Batista, MSN, APRN, FNP-C  [facility]  Office: [phone]  Fax: [phone]

## 2021-03-15 NOTE — PATIENT COMMUNICATION
You  DG Vanessa 3 days ago     Nhan Gold,     I just wanted to clarify what the plan was for Plavix? Were you waiting to hear back from vascular surgery? Were you planning on having vascular, cardiology, or neurology prescribing?     Thank you!      DONOVAN Vanessa.  You 3 days ago     Nhan Torres,     Yes, waiting for vascular's input. Pt was on plavix before. Im okay with cardiology or vascular to prescribe her this as pt will not be following with me long term. Thanks     Alla

## 2021-03-16 ENCOUNTER — HOSPITAL ENCOUNTER (OUTPATIENT)
Dept: RADIOLOGY | Facility: MEDICAL CENTER | Age: 76
End: 2021-03-16
Attending: NURSE PRACTITIONER
Payer: MEDICARE

## 2021-03-16 DIAGNOSIS — R29.818 TRANSIENT NEUROLOGICAL SYMPTOMS: ICD-10-CM

## 2021-03-16 DIAGNOSIS — Z86.73 HISTORY OF STROKE: ICD-10-CM

## 2021-03-22 ENCOUNTER — HOSPITAL ENCOUNTER (OUTPATIENT)
Dept: LAB | Facility: MEDICAL CENTER | Age: 76
End: 2021-03-22
Attending: NURSE PRACTITIONER
Payer: MEDICARE

## 2021-03-22 DIAGNOSIS — R29.818 TRANSIENT NEUROLOGICAL SYMPTOMS: ICD-10-CM

## 2021-03-22 DIAGNOSIS — R73.09 ALTERED GLUCOSE METABOLISM: ICD-10-CM

## 2021-03-22 DIAGNOSIS — Z86.73 HISTORY OF STROKE: ICD-10-CM

## 2021-03-22 LAB
BASOPHILS # BLD AUTO: 0.9 % (ref 0–1.8)
BASOPHILS # BLD: 0.06 K/UL (ref 0–0.12)
EOSINOPHIL # BLD AUTO: 0.22 K/UL (ref 0–0.51)
EOSINOPHIL NFR BLD: 3.4 % (ref 0–6.9)
ERYTHROCYTE [DISTWIDTH] IN BLOOD BY AUTOMATED COUNT: 44.4 FL (ref 35.9–50)
EST. AVERAGE GLUCOSE BLD GHB EST-MCNC: 128 MG/DL
HBA1C MFR BLD: 6.1 % (ref 4–5.6)
HCT VFR BLD AUTO: 44.3 % (ref 42–52)
HGB BLD-MCNC: 15 G/DL (ref 14–18)
IMM GRANULOCYTES # BLD AUTO: 0.02 K/UL (ref 0–0.11)
IMM GRANULOCYTES NFR BLD AUTO: 0.3 % (ref 0–0.9)
LYMPHOCYTES # BLD AUTO: 1.93 K/UL (ref 1–4.8)
LYMPHOCYTES NFR BLD: 29.9 % (ref 22–41)
MCH RBC QN AUTO: 32.5 PG (ref 27–33)
MCHC RBC AUTO-ENTMCNC: 33.9 G/DL (ref 33.7–35.3)
MCV RBC AUTO: 95.9 FL (ref 81.4–97.8)
MONOCYTES # BLD AUTO: 0.84 K/UL (ref 0–0.85)
MONOCYTES NFR BLD AUTO: 13 % (ref 0–13.4)
NEUTROPHILS # BLD AUTO: 3.38 K/UL (ref 1.82–7.42)
NEUTROPHILS NFR BLD: 52.5 % (ref 44–72)
NRBC # BLD AUTO: 0 K/UL
NRBC BLD-RTO: 0 /100 WBC
PLATELET # BLD AUTO: 190 K/UL (ref 164–446)
PMV BLD AUTO: 11.1 FL (ref 9–12.9)
RBC # BLD AUTO: 4.62 M/UL (ref 4.7–6.1)
WBC # BLD AUTO: 6.5 K/UL (ref 4.8–10.8)

## 2021-03-22 PROCEDURE — 85025 COMPLETE CBC W/AUTO DIFF WBC: CPT

## 2021-03-22 PROCEDURE — 36415 COLL VENOUS BLD VENIPUNCTURE: CPT | Mod: GA

## 2021-03-22 PROCEDURE — 83036 HEMOGLOBIN GLYCOSYLATED A1C: CPT | Mod: GA

## 2021-03-25 ENCOUNTER — OFFICE VISIT (OUTPATIENT)
Dept: NEUROLOGY | Facility: MEDICAL CENTER | Age: 76
End: 2021-03-25
Attending: NURSE PRACTITIONER
Payer: MEDICARE

## 2021-03-25 VITALS
HEART RATE: 62 BPM | TEMPERATURE: 96.4 F | RESPIRATION RATE: 16 BRPM | BODY MASS INDEX: 29.96 KG/M2 | OXYGEN SATURATION: 94 % | DIASTOLIC BLOOD PRESSURE: 68 MMHG | SYSTOLIC BLOOD PRESSURE: 138 MMHG | WEIGHT: 227.07 LBS

## 2021-03-25 DIAGNOSIS — I70.90 ATHEROSCLEROSIS: ICD-10-CM

## 2021-03-25 DIAGNOSIS — Z98.890 HISTORY OF CAROTID ENDARTERECTOMY: ICD-10-CM

## 2021-03-25 DIAGNOSIS — I48.0 PAROXYSMAL ATRIAL FIBRILLATION (HCC): ICD-10-CM

## 2021-03-25 DIAGNOSIS — I65.21 CAROTID STENOSIS, RIGHT: ICD-10-CM

## 2021-03-25 DIAGNOSIS — R73.09 ALTERED GLUCOSE METABOLISM: ICD-10-CM

## 2021-03-25 DIAGNOSIS — Z86.73 HISTORY OF STROKE: ICD-10-CM

## 2021-03-25 PROCEDURE — 99214 OFFICE O/P EST MOD 30 MIN: CPT | Performed by: NURSE PRACTITIONER

## 2021-03-25 PROCEDURE — 99212 OFFICE O/P EST SF 10 MIN: CPT | Performed by: NURSE PRACTITIONER

## 2021-03-25 RX ORDER — CLOPIDOGREL BISULFATE 75 MG/1
75 TABLET ORAL DAILY
Qty: 30 TABLET | Refills: 0 | Status: SHIPPED | OUTPATIENT
Start: 2021-03-25 | End: 2021-04-06

## 2021-03-25 RX ORDER — TRIAMCINOLONE ACETONIDE 1 MG/G
1 CREAM TOPICAL PRN
COMMUNITY
Start: 2021-02-23 | End: 2021-03-25

## 2021-03-25 RX ORDER — LORAZEPAM 0.5 MG/1
0.5 TABLET ORAL PRN
COMMUNITY
Start: 2021-01-06 | End: 2021-03-25

## 2021-04-06 ENCOUNTER — PATIENT MESSAGE (OUTPATIENT)
Dept: CARDIOLOGY | Facility: MEDICAL CENTER | Age: 76
End: 2021-04-06

## 2021-04-06 RX ORDER — ASPIRIN 81 MG/1
81 TABLET, CHEWABLE ORAL
Qty: 100 TABLET | COMMUNITY
End: 2022-04-24

## 2021-04-19 ENCOUNTER — HOSPITAL ENCOUNTER (OUTPATIENT)
Dept: CARDIOLOGY | Facility: MEDICAL CENTER | Age: 76
End: 2021-04-19
Attending: NURSE PRACTITIONER
Payer: MEDICARE

## 2021-04-19 DIAGNOSIS — Z86.73 HISTORY OF STROKE: ICD-10-CM

## 2021-04-19 DIAGNOSIS — R29.818 TRANSIENT NEUROLOGICAL SYMPTOMS: ICD-10-CM

## 2021-04-19 LAB
LV EJECT FRACT  99904: 50
LV EJECT FRACT MOD 2C 99903: 35.19
LV EJECT FRACT MOD 4C 99902: 52.38
LV EJECT FRACT MOD BP 99901: 43.42

## 2021-04-19 PROCEDURE — 93306 TTE W/DOPPLER COMPLETE: CPT

## 2021-04-19 PROCEDURE — 93306 TTE W/DOPPLER COMPLETE: CPT | Mod: 26 | Performed by: INTERNAL MEDICINE

## 2021-04-19 PROCEDURE — 700117 HCHG RX CONTRAST REV CODE 255: Performed by: NURSE PRACTITIONER

## 2021-04-19 RX ADMIN — HUMAN ALBUMIN MICROSPHERES AND PERFLUTREN 3 ML: 10; .22 INJECTION, SOLUTION INTRAVENOUS at 16:03

## 2021-05-11 NOTE — PROGRESS NOTES
Chief Complaint   Patient presents with   • Follow-Up     History of stroke       Problem List Items Addressed This Visit     History of carotid endarterectomy    Paroxysmal atrial fibrillation (HCC)      Other Visit Diagnoses     History of stroke        Atherosclerosis        Altered glucose metabolism              Interim History:  Carl Blount 76 y.o. male presents today with wife for f/u.     Pt is doing well. No further events.They have f/u with Dr. Turpin and his plavix was switched to baby asa daily. They are not sure of the duration of the asa. He will have a repeat imaging in the future. He had his Echo and MRI brain done. Wife also brought a copy of previous MRI brain when he had a stroke in 2012. He is still drinking alcohol and has no plans on quitting. He states he is due for colonoscopy but wants to postpone this for now until he sees cardiology as he is not sure if he is willing to take the risk. He states he has a PCP in the [place] area and is wanting to establish with one here but does not want a referral for now. No other issues.     Past medical history:   Past Medical History:   Diagnosis Date   • Hyperlipidemia    • Hypertension        Past surgical history:   History reviewed. No pertinent surgical history.    Family history:   History reviewed. No pertinent family history.    Social history:   Social History     Socioeconomic History   • Marital status:      Spouse name: Not on file   • Number of children: Not on file   • Years of education: Not on file   • Highest education level: Not on file   Occupational History   • Not on file   Tobacco Use   • Smoking status: Former Smoker   • Smokeless tobacco: Never Used   Substance and Sexual Activity   • Alcohol use: Yes   • Drug use: Not on file   • Sexual activity: Not on file   Other Topics Concern   • Not on file   Social History Narrative   • Not on file     Social Determinants of Health     Financial Resource Strain:    •  Difficulty of Paying Living Expenses:    Food Insecurity:    • Worried About Running Out of Food in the Last Year:    • Ran Out of Food in the Last Year:    Transportation Needs:    • Lack of Transportation (Medical):    • Lack of Transportation (Non-Medical):    Physical Activity:    • Days of Exercise per Week:    • Minutes of Exercise per Session:    Stress:    • Feeling of Stress :    Social Connections:    • Frequency of Communication with Friends and Family:    • Frequency of Social Gatherings with Friends and Family:    • Attends Holiness Services:    • Active Member of Clubs or Organizations:    • Attends Club or Organization Meetings:    • Marital Status:    Intimate Partner Violence:    • Fear of Current or Ex-Partner:    • Emotionally Abused:    • Physically Abused:    • Sexually Abused:        Current medications:   Current Outpatient Medications   Medication   • aspirin (ASA) 81 MG Chew Tab chewable tablet   • metoprolol SR (TOPROL XL) 25 MG TABLET SR 24 HR   • atorvastatin (LIPITOR) 80 MG tablet   • betamethasone dipropionate (DIPROLENE) 0.05 % Ointment   • triamcinolone acetonide (KENALOG) 0.1 % Ointment   • fluorouracil (EFUDEX) 5 % cream   • cetirizine (ZYRTEC) 10 MG Tab   • cyclobenzaprine (FLEXERIL) 10 MG Tab   • hydroCHLOROthiazide (HYDRODIURIL) 25 MG Tab   • clobetasol (TEMOVATE) 0.05 % Cream   • lidocaine (LIDODERM) 5 % Patch   • lisinopril (PRINIVIL) 20 MG Tab   • LORazepam (ATIVAN) 1 MG Tab   • pantoprazole (PROTONIX) 40 MG Tablet Delayed Response   • rivaroxaban (XARELTO) 20 MG Tab tablet   • tamsulosin (FLOMAX) 0.4 MG capsule   • zolpidem (AMBIEN) 5 MG Tab     No current facility-administered medications for this visit.       Medication Allergy:  No Known Allergies    Review of systems:   General: Denies fevers or chills, or nightsweats, or generalized fatigue.    Head: Denies headaches or dizziness or lightheadedness  Musculoskeletal: Denies muscle pain or swelling, no atrophy, no neck  and back pain or stiffness.   Neurologic: Denies facial droopiness, muscle weakness (focal or generalized), paresthesias, ataxia, change in speech or language, memory loss, abnormal movements, seizures, loss of consciousness, or episodes of confusion.   Psychiatric: Denies anxiety, depression, mood swings, suicidal or homicidal thoughts       Physical examination:   Vitals:    05/20/21 1058   BP: 138/76   BP Location: Right arm   Patient Position: Sitting   BP Cuff Size: Adult   Pulse: 80   Resp: 16   Temp: (!) 35.6 °C (96 °F)   TempSrc: Temporal   SpO2: 96%   Weight: 105 kg (232 lb)     General: Patient in no acute distress, pleasant and cooperative.  HEENT: Normocephalic, no signs of acute trauma.   Neck: Supple, No carotid bruits. There is normal range of motion.   Resp: clear to auscultation bilaterally. No wheezes or crackles.   CV: RRR, no murmurs.   Musculoskeletal: joints exhibit full range of motion  Psychiatric: No hallucinatory behavior. No symptoms of depression or suicidal ideation. Mood and affect appear normal on exam.     NEUROLOGICAL EXAM:   Mental status, orientation: Awake, alert and fully oriented.   Speech and language: speech is clear and fluent. The patient is able to name, repeat and comprehend.   Memory: There is intact recollection of recent and remote events.   Motor exam: Strength is 5/5 in all extremities. Tone is normal. No abnormal movements were seen on exam.   Sensory exam reveals normal sense of light touch in all extremities.   Gait: The patient was able to get up from seated position on first attempt without requiring assistance. Found to be steady when walking. Movements were fluid with normal arm swing.       ANCILLARY DATA REVIEWED:     Lab Data Review:  Reviewed in chart.     Records reviewed:  Reviewed in chart.    Imaging:  CTA head 2/5/21  1.  Moderate chronic left MCA territory infarct with asymmetrically small M2 segment left MCA branch.  2.  No large vessel occlusion or  aneurysm.     CTA neck 2/5/21  1.  Severe proximal/mid right internal carotid artery calcified plaque with 65-70% diameter stenosis.  2.  No significant left carotid artery stenosis.  3.  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  4.  Severe stenosis at the origin of the right vertebral artery.  5.  Moderate proximal left vertebral artery stenosis.      Echo 4/19/21  Fair quality study, improved with contrast.  Negative bubble study including Valsalva.  Mildly reduced LV systolic function, LV EF   50% with [id number]   variability due to frequent ectopy.  Wall motion is difficult to assess due to beat to beat variation, no   clifton wall motion abnormalities.  Abnormal septal motion consistent with conduction delay.  Right ventricle not well visualized, grossly normal size and function.  Probably mild mitral annular calcification.  The aortic valve is not well visualized, probably trileaflet with   sclerosis.  Unable to estimate RVSP/RAP.    MRI brain w & w/o 3/4/2012-[facility]  Acute left MCA territory infarction, without hemorrhagic conversion.  Absent flow related enhancement in the left intracranial internal carotid artery, with reconstitution of flow distally within the carotid terminus and flow seen in the left FARIEDH and MCA branches, likely from collateral supply from the right side circulation.  Perfusion abnormality of the right MCA territory, larger than the area of infarction, suggesting tissue at risk  Prominent venous structure in the region of infarct likely reflects shunting related to the infarction.    MRI brain 5/13/2021  Post infarct changes along the left insular cortex and operculum with hemosiderin staining and gliosis in the adjacent white matter    No acute ischemia      ASSESSMENT AND PLAN:    1. History of stroke    2. Atherosclerosis    3. Altered glucose  metabolism  4. Paroxysmal atrial fibrillation (HCC)    5. History of carotid endarterectomy            CLINICAL DECISION:  One episode of aphasia in Dec 2020 lasting ~15min (recrudescence vs TIA). LVEF 50%, neg for shunt. Vessel imaging showed severe proximal/mid right internal carotid artery calcified plaque with 65-70% diameter stenosis, severe stenosis at the origin of the right vertebral artery and moderate proximal left vertebral artery stenosis. Also noted moderate chronic L MCA infarct likely related to hx L ICA stenosis s/p CEA. He did see vascular surgeon and no recommendation for surgical intervention for now.  He is currently on xarelto for PAF. Used to be on both xarelto and plavix but plavix was d/c'd by cardiology. He may need to be on antiplatelet given his atherosclerosis and they are aware aware of increased risk of bleeding on the combination. Discuss caution from falls. He was able to see vascular and he recommended for pt to be on asa instead of plavix. Duration unknown. He is aware of his risk factors including HTN (controlled), HLD (controlled), PAF on xarelto, CAD and altered glucose metabolism (recent A1c 6.1).  Repeat MRI brain did not show any new areas of infarction.        Plan:  -Continue current medications as prescribed by his providers. Discussed compliance.      -Education given to help control risk factors including:  /80, LDL 70 or less, HBA1C 7 or less.      -Needs close fu with PCP for optimal control of risk factors.      -Discussed lifestyle modification including diet, exercise and weight loss.  Limiting intake of sugar and carbohydrates. Discussed alcohol cessation but not to stop abruptly.     -Continue f/u with cardiology and vascular surgeon.          FOLLOW-UP:   Return if symptoms worsen or fail to improve.        EDUCATION AND COUNSELING:  -Usefulness of anti-platelets in secondary stroke prevention was discussed. The side effects, including increased risk for  bleeding (both traumatic and spontaneous) were reviewed with the patient at length.   -Recommended goal for LDL is 70 or less. Side effects of statin, including idiosyncratic reactions as well as more common myalgias, and liver injury  were discussed. Monitoring of LFT’s will be deferred to the patient’s primary care physician.   -Secondary stroke prevention education was provided, including; lifestyle modification with healthy diet, and exercise, as well as recommendations for optimal control of risk factors.   -Close follow up with PCP is recommended.   -Compliance with medications was discussed in detail.   -Smoking cessation education was provided for greater than 3 minutes.   -The patient/family was educated on recognition of stroke symptoms. The patient/family instructed to call 911 EMERGENTLY upon presentation of any of these symptoms/signs, to be taken to nearest emergency department for evaluation and acute care.       The patient understands and agrees that due to the complexity of his/her diagnosis, results of any testing and further recommendations will typically be discussed/made during a face to face encounter in my office. The patient and/or family further understands it is their responsibility to keep proper follow up.         Alla Batista, MSN, APRN, FNP-C  [facility]  Office: [phone]   Fax: [phone]    BILLING DOCUMENTATION:     Total time spent including chart review before and after visit was 41min. Over 50% of the time of the visit today was spent on counseling and or coordination of care wtih the patient and/or family, with greater than 50% of the total discussing my assessment and plan as stated above.

## 2021-05-20 ENCOUNTER — OFFICE VISIT (OUTPATIENT)
Dept: NEUROLOGY | Facility: MEDICAL CENTER | Age: 76
End: 2021-05-20
Attending: NURSE PRACTITIONER
Payer: MEDICARE

## 2021-05-20 VITALS
OXYGEN SATURATION: 96 % | TEMPERATURE: 96 F | DIASTOLIC BLOOD PRESSURE: 76 MMHG | HEART RATE: 80 BPM | WEIGHT: 232 LBS | RESPIRATION RATE: 16 BRPM | SYSTOLIC BLOOD PRESSURE: 138 MMHG | BODY MASS INDEX: 30.61 KG/M2

## 2021-05-20 DIAGNOSIS — Z86.73 HISTORY OF STROKE: ICD-10-CM

## 2021-05-20 DIAGNOSIS — R73.09 ALTERED GLUCOSE METABOLISM: ICD-10-CM

## 2021-05-20 DIAGNOSIS — I48.0 PAROXYSMAL ATRIAL FIBRILLATION (HCC): ICD-10-CM

## 2021-05-20 DIAGNOSIS — I70.90 ATHEROSCLEROSIS: ICD-10-CM

## 2021-05-20 DIAGNOSIS — Z98.890 HISTORY OF CAROTID ENDARTERECTOMY: ICD-10-CM

## 2021-05-20 PROCEDURE — 99215 OFFICE O/P EST HI 40 MIN: CPT | Performed by: NURSE PRACTITIONER

## 2021-05-20 PROCEDURE — 99212 OFFICE O/P EST SF 10 MIN: CPT | Performed by: NURSE PRACTITIONER

## 2021-05-26 ENCOUNTER — APPOINTMENT (OUTPATIENT)
Dept: RADIOLOGY | Facility: MEDICAL CENTER | Age: 76
End: 2021-05-26
Payer: MEDICARE

## 2021-05-26 ENCOUNTER — HOSPITAL ENCOUNTER (EMERGENCY)
Facility: MEDICAL CENTER | Age: 76
End: 2021-05-26
Attending: EMERGENCY MEDICINE
Payer: MEDICARE

## 2021-05-26 ENCOUNTER — APPOINTMENT (OUTPATIENT)
Dept: RADIOLOGY | Facility: MEDICAL CENTER | Age: 76
End: 2021-05-26
Attending: EMERGENCY MEDICINE
Payer: MEDICARE

## 2021-05-26 VITALS
DIASTOLIC BLOOD PRESSURE: 77 MMHG | SYSTOLIC BLOOD PRESSURE: 183 MMHG | WEIGHT: 232 LBS | RESPIRATION RATE: 32 BRPM | TEMPERATURE: 98 F | HEART RATE: 81 BPM | OXYGEN SATURATION: 97 % | BODY MASS INDEX: 30.75 KG/M2 | HEIGHT: 73 IN

## 2021-05-26 DIAGNOSIS — T14.8XXA MULTIPLE SKIN TEARS: ICD-10-CM

## 2021-05-26 DIAGNOSIS — S09.90XA CLOSED HEAD INJURY, INITIAL ENCOUNTER: ICD-10-CM

## 2021-05-26 DIAGNOSIS — I10 HYPERTENSION, UNSPECIFIED TYPE: ICD-10-CM

## 2021-05-26 DIAGNOSIS — Z79.01 CHRONIC ANTICOAGULATION: ICD-10-CM

## 2021-05-26 LAB
ALBUMIN SERPL BCP-MCNC: 3.9 G/DL (ref 3.2–4.9)
ALBUMIN/GLOB SERPL: 1.3 G/DL
ALP SERPL-CCNC: 91 U/L (ref 30–99)
ALT SERPL-CCNC: 45 U/L (ref 2–50)
ANION GAP SERPL CALC-SCNC: 11 MMOL/L (ref 7–16)
AST SERPL-CCNC: 43 U/L (ref 12–45)
BASOPHILS # BLD AUTO: 0.4 % (ref 0–1.8)
BASOPHILS # BLD: 0.05 K/UL (ref 0–0.12)
BILIRUB SERPL-MCNC: 0.6 MG/DL (ref 0.1–1.5)
BUN SERPL-MCNC: 15 MG/DL (ref 8–22)
CALCIUM SERPL-MCNC: 9 MG/DL (ref 8.5–10.5)
CHLORIDE SERPL-SCNC: 106 MMOL/L (ref 96–112)
CO2 SERPL-SCNC: 24 MMOL/L (ref 20–33)
CREAT SERPL-MCNC: 0.69 MG/DL (ref 0.5–1.4)
EKG IMPRESSION: NORMAL
EOSINOPHIL # BLD AUTO: 0.18 K/UL (ref 0–0.51)
EOSINOPHIL NFR BLD: 1.6 % (ref 0–6.9)
ERYTHROCYTE [DISTWIDTH] IN BLOOD BY AUTOMATED COUNT: 48.6 FL (ref 35.9–50)
GLOBULIN SER CALC-MCNC: 3 G/DL (ref 1.9–3.5)
GLUCOSE SERPL-MCNC: 116 MG/DL (ref 65–99)
HCT VFR BLD AUTO: 44.2 % (ref 42–52)
HGB BLD-MCNC: 14.6 G/DL (ref 14–18)
IMM GRANULOCYTES # BLD AUTO: 0.09 K/UL (ref 0–0.11)
IMM GRANULOCYTES NFR BLD AUTO: 0.8 % (ref 0–0.9)
LYMPHOCYTES # BLD AUTO: 1.82 K/UL (ref 1–4.8)
LYMPHOCYTES NFR BLD: 15.8 % (ref 22–41)
MCH RBC QN AUTO: 32.4 PG (ref 27–33)
MCHC RBC AUTO-ENTMCNC: 33 G/DL (ref 33.7–35.3)
MCV RBC AUTO: 98.2 FL (ref 81.4–97.8)
MONOCYTES # BLD AUTO: 1.1 K/UL (ref 0–0.85)
MONOCYTES NFR BLD AUTO: 9.5 % (ref 0–13.4)
NEUTROPHILS # BLD AUTO: 8.28 K/UL (ref 1.82–7.42)
NEUTROPHILS NFR BLD: 71.9 % (ref 44–72)
NRBC # BLD AUTO: 0 K/UL
NRBC BLD-RTO: 0 /100 WBC
PLATELET # BLD AUTO: 175 K/UL (ref 164–446)
PMV BLD AUTO: 10.3 FL (ref 9–12.9)
POTASSIUM SERPL-SCNC: 3.8 MMOL/L (ref 3.6–5.5)
PROT SERPL-MCNC: 6.9 G/DL (ref 6–8.2)
RBC # BLD AUTO: 4.5 M/UL (ref 4.7–6.1)
SODIUM SERPL-SCNC: 141 MMOL/L (ref 135–145)
WBC # BLD AUTO: 11.5 K/UL (ref 4.8–10.8)

## 2021-05-26 PROCEDURE — 99284 EMERGENCY DEPT VISIT MOD MDM: CPT

## 2021-05-26 PROCEDURE — 80053 COMPREHEN METABOLIC PANEL: CPT

## 2021-05-26 PROCEDURE — 85025 COMPLETE CBC W/AUTO DIFF WBC: CPT

## 2021-05-26 PROCEDURE — A9270 NON-COVERED ITEM OR SERVICE: HCPCS | Performed by: EMERGENCY MEDICINE

## 2021-05-26 PROCEDURE — 73030 X-RAY EXAM OF SHOULDER: CPT | Mod: RT

## 2021-05-26 PROCEDURE — 700102 HCHG RX REV CODE 250 W/ 637 OVERRIDE(OP): Performed by: EMERGENCY MEDICINE

## 2021-05-26 PROCEDURE — 70450 CT HEAD/BRAIN W/O DYE: CPT | Mod: MH

## 2021-05-26 PROCEDURE — 72125 CT NECK SPINE W/O DYE: CPT

## 2021-05-26 PROCEDURE — 93005 ELECTROCARDIOGRAM TRACING: CPT | Performed by: EMERGENCY MEDICINE

## 2021-05-26 RX ORDER — ACETAMINOPHEN 325 MG/1
975 TABLET ORAL ONCE
Status: COMPLETED | OUTPATIENT
Start: 2021-05-26 | End: 2021-05-26

## 2021-05-26 RX ADMIN — ACETAMINOPHEN 975 MG: 325 TABLET, FILM COATED ORAL at 12:10

## 2021-05-26 NOTE — ED NOTES
The patient has been provided with discharge education and information.  The patient was also provided with instructions on follow up care and return precautions.  The patient verbalizes understanding of discharge instructions, follow up care, and return precautions.  All questions have been answered.  NAD, A/Ox4, good color and appropriate at time of discharge.  Patient wheeled chaired out of department.

## 2021-05-26 NOTE — ED NOTES
Received report       Pt is alert and oriented, speaking in full sentences, follows commands and responds appropriately to questions. NAD. Resp are even and unlabored.   Patient and staff wearing appropriate PPE

## 2021-05-26 NOTE — ED TRIAGE NOTES
"Chief Complaint   Patient presents with   • T-5000 FALL     Patient was heard by his wife fall outside his home on the cement. The patient was found lying right lateral on the ground and was \"out of it\". The patient does not remember the event and has repetitive questioning     Patient BIBA for above complaint. TBI alert activated. Patient is currently AOx3, otherwise neurologically intact.    Vitals:    05/26/21 1152   BP: 159/71   Pulse: 80   Resp: 18   Temp: 36.7 °C (98 °F)   SpO2: 98%       "

## 2021-05-26 NOTE — ED PROVIDER NOTES
ED Provider Note    CHIEF COMPLAINT  Head injury    HPI  Carl Blount is a 76 y.o. male who presents with a history of CVA in 2012, on chronic anticoagulation Xarelto.  Today he was walking in his garage and fell, the wife heard him fall and heard him moaning after hitting the ground.  Set off their car alarm.  The patient was found down alert with head injury.  He denies any focal neurologic deficits that are new.  He denies any chest pain or other symptoms.  He is complaining of right shoulder pain that is moderate.  The pain in his shoulder is worse when he tries to move the shoulder.    REVIEW OF SYSTEMS  See HPI for further details. All other systems are negative.     PAST MEDICAL HISTORY   has a past medical history of Afib (HCC), Hyperlipidemia, and Hypertension.    SOCIAL HISTORY  Social History     Tobacco Use   • Smoking status: Former Smoker   • Smokeless tobacco: Never Used   Vaping Use   • Vaping Use: Never used   Substance and Sexual Activity   • Alcohol use: Yes     Comment: 2 drinks/night   • Drug use: Not on file   • Sexual activity: Not on file       SURGICAL HISTORY  patient denies any surgical history    CURRENT MEDICATIONS  Home Medications     Reviewed by Lencho Maynard R.N. (Registered Nurse) on 05/26/21 at 1201  Med List Status: Partial   Medication Last Dose Status   aspirin (ASA) 81 MG Chew Tab chewable tablet  Active   atorvastatin (LIPITOR) 80 MG tablet  Active   betamethasone dipropionate (DIPROLENE) 0.05 % Ointment  Active   cetirizine (ZYRTEC) 10 MG Tab  Active   clobetasol (TEMOVATE) 0.05 % Cream  Active   cyclobenzaprine (FLEXERIL) 10 MG Tab  Active   fluorouracil (EFUDEX) 5 % cream  Active   hydroCHLOROthiazide (HYDRODIURIL) 25 MG Tab  Active   lidocaine (LIDODERM) 5 % Patch  Active   lisinopril (PRINIVIL) 20 MG Tab  Active   LORazepam (ATIVAN) 1 MG Tab  Active   metoprolol SR (TOPROL XL) 25 MG TABLET SR 24 HR  Active   pantoprazole (PROTONIX) 40 MG Tablet Delayed  "Response  Active   rivaroxaban (XARELTO) 20 MG Tab tablet  Active   tamsulosin (FLOMAX) 0.4 MG capsule  Active   triamcinolone acetonide (KENALOG) 0.1 % Ointment  Active   zolpidem (AMBIEN) 5 MG Tab  Active                  ALLERGIES  No Known Allergies    PHYSICAL EXAM  VITAL SIGNS: BP (!) 180/86   Pulse 79   Temp 36.7 °C (98 °F) (Temporal)   Resp (!) 32   Ht 1.854 m (6' 1\")   Wt 105 kg (232 lb)   SpO2 95%   BMI 30.61 kg/m²  @[id number]::@   Pulse ox interpretation: I interpret this pulse ox as normal.  Constitutional: Alert, abrasion to right forehead with no suturable laceration.  HENT: abrasion to right forehead with no suturable laceration., Bilateral external ears normal, Nose normal.   Eyes: Pupils are equal and reactive, Conjunctiva normal, Non-icteric.   Neck: Normal range of motion, No tenderness, Supple, No stridor.   Lymphatic: No lymphadenopathy noted.   Cardiovascular: Regular rate and rhythm, no murmurs.   Thorax & Lungs: Normal breath sounds, No respiratory distress, No wheezing, No chest tenderness.   Abdomen: Bowel sounds normal, Soft, No tenderness, No masses, No pulsatile masses. No peritoneal signs.  Skin: Warm, Dry, No erythema, No rash.   Back: No bony tenderness, No CVA tenderness.   Extremities: Intact distal pulses, skin tear of right forearm with no suturable laceration.  Musculoskeletal: Tenderness of right shoulder, pain with range of motion, no obvious deformity.  Neurologic: Alert , Normal motor function, Normal sensory function, No focal deficits noted.   Psychiatric: Affect normal, Judgment normal, Mood normal.       DIAGNOSTIC STUDIES / PROCEDURES    EKG  This is a 12-lead EKG interpretation by myself.  It is normal sinus rhythm at a rate of 77.  The axis is LAD -58 degrees.  The QRS intervals prolonged 120 consistent with IVCD.  The other intervals are normal.  There are nonspecific changes.  Compared to ECG 07/24/2020 14:22:47  Nonspecific changes, previous IVCD, does not " indicate ischemia nor arrhythmia at this time.    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   1.  No acute intracranial abnormality is identified.   2.  Areas of left MCA territory encephalomalacia with ex vacuo dilatation of the left lateral ventricle.   3.  Right frontoparietal scalp hematoma and laceration.      CT-CSPINE WITHOUT PLUS RECONS   Final Result      1.  Multilevel degenerative changes as above described.   2.  Minimal grade 1 anterolisthesis of C3 on C4.   3.  Carotid atherosclerotic plaque, right greater than left.              COURSE & MEDICAL DECISION MAKING  Pertinent Labs & Imaging studies reviewed. (See chart for details)    Differential diagnosis: Intracranial hemorrhage, right shoulder fracture    The patient meets TBI protocol on Xarelto, he went straight to the CT scanner and there was found to have no intracranial hemorrhage.  Right shoulder x-ray was ordered, the patient was given Tylenol 375 mg p.o.    The patient's shoulder x-ray is negative.  He will be discharged with instructions on head injury, the wife understands to return with any change in mental status or worrisome signs of delayed hemorrhage on Xarelto.  She will keep track of him falling in the side of  Xarelto risk outweighs benefit, they will discuss this with her primary care doctor.      The patient will return for new or worsening symptoms and is stable at the time of discharge.    The patient is referred to a primary physician for blood pressure management, diabetic screening, and for all other preventative health concerns.        DISPOSITION:  Patient will be discharged home in stable condition.    FOLLOW UP:  [facility], Emergency Dept  [address] [id number] [phone]    If symptoms worsen          See your doctor for blood pressure recheck      OUTPATIENT MEDICATIONS:  New Prescriptions    No medications on file      The patient will return for worsening symptoms and is stable at the time of discharge. The patient verbalizes understanding and will comply.    FINAL IMPRESSION  1. Closed head injury, initial encounter     2. Chronic anticoagulation     3. Multiple skin tears     4. Hypertension, unspecified type                Electronically signed by: Ovidio العراقي M.D., 5/26/2021 11:40 AM

## 2021-06-04 ENCOUNTER — HOSPITAL ENCOUNTER (OUTPATIENT)
Dept: HOSPITAL 8 - WOUND | Age: 76
Discharge: HOME | End: 2021-06-04
Attending: INTERNAL MEDICINE
Payer: MEDICARE

## 2021-06-04 DIAGNOSIS — S61.411A: ICD-10-CM
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
## 2021-06-04 DIAGNOSIS — M19.90: ICD-10-CM
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
## 2021-06-04 DIAGNOSIS — Z86.73: ICD-10-CM

## 2021-06-04 DIAGNOSIS — S41.111A: ICD-10-CM

## 2021-06-04 DIAGNOSIS — E78.5: ICD-10-CM

## 2021-06-04 DIAGNOSIS — I48.91: ICD-10-CM

## 2021-06-04 DIAGNOSIS — S01.81XA: ICD-10-CM

## 2021-06-04 DIAGNOSIS — S06.0X1A: Primary | ICD-10-CM

## 2021-06-04 DIAGNOSIS — Z85.828: ICD-10-CM
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
## 2021-06-04 DIAGNOSIS — Z87.891: ICD-10-CM

## 2021-06-04 DIAGNOSIS — I10: ICD-10-CM

## 2021-06-04 DIAGNOSIS — F41.9: ICD-10-CM

## 2021-06-04 DIAGNOSIS — N40.0: ICD-10-CM

## 2021-06-04 DIAGNOSIS — S51.801A: ICD-10-CM
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
## 2021-06-04 DIAGNOSIS — G47.00: ICD-10-CM

## 2021-06-04 PROCEDURE — G0463 HOSPITAL OUTPT CLINIC VISIT: HCPCS

## 2021-06-04 PROCEDURE — 97597 DBRDMT OPN WND 1ST 20 CM/<: CPT

## 2021-06-07 ENCOUNTER — APPOINTMENT (OUTPATIENT)
Dept: LAB | Facility: MEDICAL CENTER | Age: 76
End: 2021-06-07
Attending: INTERNAL MEDICINE
Payer: MEDICARE

## 2021-06-11 ENCOUNTER — HOSPITAL ENCOUNTER (OUTPATIENT)
Dept: HOSPITAL 8 - WOUND | Age: 76
Discharge: HOME | End: 2021-06-11
Attending: INTERNAL MEDICINE
Payer: MEDICARE

## 2021-06-11 DIAGNOSIS — M19.90: ICD-10-CM

## 2021-06-11 DIAGNOSIS — S01.81XD: Primary | ICD-10-CM

## 2021-06-11 DIAGNOSIS — Z86.73: ICD-10-CM
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
## 2021-06-11 DIAGNOSIS — I48.91: ICD-10-CM

## 2021-06-11 DIAGNOSIS — Z85.828: ICD-10-CM

## 2021-06-11 DIAGNOSIS — N40.0: ICD-10-CM

## 2021-06-11 DIAGNOSIS — F41.9: ICD-10-CM

## 2021-06-11 DIAGNOSIS — Z87.891: ICD-10-CM

## 2021-06-11 DIAGNOSIS — E78.5: ICD-10-CM

## 2021-06-11 DIAGNOSIS — I10: ICD-10-CM

## 2021-06-11 DIAGNOSIS — S01.90XD: ICD-10-CM

## 2021-06-11 DIAGNOSIS — Z79.01: ICD-10-CM

## 2021-06-11 DIAGNOSIS — G47.00: ICD-10-CM

## 2021-06-11 PROCEDURE — 11042 DBRDMT SUBQ TIS 1ST 20SQCM/<: CPT

## 2021-06-15 DIAGNOSIS — E78.2 MIXED HYPERLIPIDEMIA: ICD-10-CM

## 2021-06-18 ENCOUNTER — HOSPITAL ENCOUNTER (OUTPATIENT)
Dept: HOSPITAL 8 - WOUND | Age: 76
Discharge: HOME | End: 2021-06-18
Attending: INTERNAL MEDICINE
Payer: MEDICARE

## 2021-06-18 ENCOUNTER — PATIENT MESSAGE (OUTPATIENT)
Dept: CARDIOLOGY | Facility: MEDICAL CENTER | Age: 76
End: 2021-06-18

## 2021-06-18 ENCOUNTER — OFFICE VISIT (OUTPATIENT)
Dept: CARDIOLOGY | Facility: MEDICAL CENTER | Age: 76
End: 2021-06-18
Payer: MEDICARE

## 2021-06-18 VITALS
WEIGHT: 230 LBS | HEIGHT: 73 IN | DIASTOLIC BLOOD PRESSURE: 78 MMHG | OXYGEN SATURATION: 96 % | HEART RATE: 67 BPM | SYSTOLIC BLOOD PRESSURE: 132 MMHG | BODY MASS INDEX: 30.48 KG/M2

## 2021-06-18 DIAGNOSIS — S61.411D: ICD-10-CM

## 2021-06-18 DIAGNOSIS — S51.801D: ICD-10-CM

## 2021-06-18 DIAGNOSIS — I48.0 PAROXYSMAL ATRIAL FIBRILLATION (HCC): ICD-10-CM

## 2021-06-18 DIAGNOSIS — S41.111D: ICD-10-CM

## 2021-06-18 DIAGNOSIS — G45.9 TIA (TRANSIENT ISCHEMIC ATTACK): ICD-10-CM

## 2021-06-18 DIAGNOSIS — Z86.73: ICD-10-CM

## 2021-06-18 DIAGNOSIS — Z87.891: ICD-10-CM

## 2021-06-18 DIAGNOSIS — M19.90: ICD-10-CM

## 2021-06-18 DIAGNOSIS — E78.2 MIXED HYPERLIPIDEMIA: ICD-10-CM

## 2021-06-18 DIAGNOSIS — F41.9: ICD-10-CM

## 2021-06-18 DIAGNOSIS — I25.10 CORONARY ARTERY DISEASE INVOLVING NATIVE HEART WITHOUT ANGINA PECTORIS, UNSPECIFIED VESSEL OR LESION TYPE: ICD-10-CM

## 2021-06-18 DIAGNOSIS — I10: ICD-10-CM

## 2021-06-18 DIAGNOSIS — I65.23 BILATERAL CAROTID ARTERY STENOSIS: ICD-10-CM
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
## 2021-06-18 DIAGNOSIS — N40.0: ICD-10-CM

## 2021-06-18 DIAGNOSIS — I48.91: ICD-10-CM

## 2021-06-18 DIAGNOSIS — Z79.01: ICD-10-CM

## 2021-06-18 DIAGNOSIS — Z85.828: ICD-10-CM

## 2021-06-18 DIAGNOSIS — G47.00: ICD-10-CM

## 2021-06-18 DIAGNOSIS — S01.81XD: Primary | ICD-10-CM

## 2021-06-18 DIAGNOSIS — S06.0X1D: ICD-10-CM

## 2021-06-18 DIAGNOSIS — E78.2: ICD-10-CM

## 2021-06-18 DIAGNOSIS — I10 ESSENTIAL HYPERTENSION: ICD-10-CM

## 2021-06-18 PROCEDURE — 97597 DBRDMT OPN WND 1ST 20 CM/<: CPT

## 2021-06-18 PROCEDURE — 99214 OFFICE O/P EST MOD 30 MIN: CPT | Performed by: INTERNAL MEDICINE

## 2021-06-18 RX ORDER — ATORVASTATIN CALCIUM 80 MG/1
80 TABLET, FILM COATED ORAL
Qty: 90 TABLET | Refills: 3 | Status: SHIPPED | OUTPATIENT
Start: 2021-06-18 | End: 2021-11-29

## 2021-06-18 ASSESSMENT — ENCOUNTER SYMPTOMS
HEARTBURN: 0
FEVER: 0
NEAR-SYNCOPE: 0
ALTERED MENTAL STATUS: 0
VOMITING: 0
DECREASED APPETITE: 0
WEIGHT GAIN: 0
PALPITATIONS: 0
NAUSEA: 0
ABDOMINAL PAIN: 0
PND: 0
CONSTIPATION: 0
DYSPNEA ON EXERTION: 0
DIZZINESS: 0
COUGH: 0
IRREGULAR HEARTBEAT: 0
DEPRESSION: 0
BACK PAIN: 0
SYNCOPE: 0
SHORTNESS OF BREATH: 0
CLAUDICATION: 0
BLURRED VISION: 0
DIARRHEA: 0
ORTHOPNEA: 0
FLANK PAIN: 0
WEIGHT LOSS: 0

## 2021-06-18 ASSESSMENT — FIBROSIS 4 INDEX: FIB4 SCORE: 2.78

## 2021-06-18 NOTE — PROGRESS NOTES
Cardiology Note    Chief Complaint   Patient presents with   • Atrial Fibrillation     F/V Dx: Paroxysmal atrial fibrillation (HCC)   • Transient Ischemic Attack   • Carotid Artery Stenosis     F/V Dx: Bilateral carotid artery stenosis   • HTN (Controlled)   • Coronary Artery Disease     F/V Dx: Coronary artery disease involving native heart without angina pectoris       History of Present Illness: Carl Blount is a 76 y.o. male PMH CAD/PCI LONG RCA 4/2018, L CEA c/b L ECA chronic dissection, CVA ischemic multiple / TIA, paroxysmal atrial fibrillation found on loop recorder, HLD, HTN, chronic R iliac dissection who presents for follow up.     Since last visit, underwent further brain imaging. Was able to follow up with neurology and thereafter vascular surgery. Restarted on aspirin in addition to doac. Unfortunately, he suffered a mechanical fall from standing which resulted in face / head trauma and bruising but no intracranial acute findings on CT. Seen in ED 5/2021.   Today complains of fatigue. He was told to expect this as he likely had a concussion. No cardiac complaints; especially no chest pain/pressure, dyspnea, palpitations, orthopnea, pnd, edema, syncope. Adds that home -135 most of the time.     Review of Systems   Constitutional: Negative for decreased appetite, fever, malaise/fatigue, weight gain and weight loss.   HENT: Negative for congestion and nosebleeds.    Eyes: Negative for blurred vision.   Cardiovascular: Negative for chest pain, claudication, dyspnea on exertion, irregular heartbeat, leg swelling, near-syncope, orthopnea, palpitations, paroxysmal nocturnal dyspnea and syncope.   Respiratory: Negative for cough and shortness of breath.    Endocrine: Negative for cold intolerance and heat intolerance.   Skin: Negative for rash.   Musculoskeletal: Negative for back pain.   Gastrointestinal: Negative for abdominal pain, constipation, diarrhea, heartburn, melena, nausea and  vomiting.   Genitourinary: Negative for dysuria, flank pain and hematuria.   Neurological: Negative for dizziness.   Psychiatric/Behavioral: Negative for altered mental status and depression.         Past Medical History:   Diagnosis Date   • Afib (HCC)    • Hyperlipidemia    • Hypertension          History reviewed. No pertinent surgical history.      Current Outpatient Medications   Medication Sig Dispense Refill   • atorvastatin (LIPITOR) 80 MG tablet Take 1 tablet by mouth at bedtime. 90 tablet 3   • aspirin (ASA) 81 MG Chew Tab chewable tablet Chew 1 tablet every day. 100 tablet    • metoprolol SR (TOPROL XL) 25 MG TABLET SR 24 HR Take 1 tablet by mouth every day. 90 tablet 3   • fluorouracil (EFUDEX) 5 % cream Twice daily to area on the SCALP for 2-4 weeks until red and scabbed. AVOID eyes. 1 Tube 1   • cetirizine (ZYRTEC) 10 MG Tab Take 10 mg by mouth every day.     • cyclobenzaprine (FLEXERIL) 10 MG Tab Take 10 mg by mouth 3 times a day as needed.     • hydroCHLOROthiazide (HYDRODIURIL) 25 MG Tab Take 25 mg by mouth every day.     • lidocaine (LIDODERM) 5 % Patch Apply 1 Patch to skin as directed every 24 hours.     • lisinopril (PRINIVIL) 20 MG Tab Take 20 mg by mouth every day.     • LORazepam (ATIVAN) 1 MG Tab Take 0.5 mg by mouth every four hours as needed for Anxiety.     • pantoprazole (PROTONIX) 40 MG Tablet Delayed Response Take 40 mg by mouth every day.     • rivaroxaban (XARELTO) 20 MG Tab tablet Take 20 mg by mouth with dinner.     • tamsulosin (FLOMAX) 0.4 MG capsule Take 0.4 mg by mouth ONE-HALF HOUR AFTER BREAKFAST.     • zolpidem (AMBIEN) 5 MG Tab Take 10 mg by mouth at bedtime as needed for Sleep.       No current facility-administered medications for this visit.         No Known Allergies      History reviewed. No pertinent family history.      Social History     Socioeconomic History   • Marital status:      Spouse name: Not on file   • Number of children: Not on file   • Years of  "education: Not on file   • Highest education level: Not on file   Occupational History   • Not on file   Tobacco Use   • Smoking status: Former Smoker   • Smokeless tobacco: Never Used   Vaping Use   • Vaping Use: Never used   Substance and Sexual Activity   • Alcohol use: Yes     Comment: 2 drinks/night   • Drug use: Not on file   • Sexual activity: Not on file   Other Topics Concern   • Not on file   Social History Narrative   • Not on file     Social Determinants of Health     Financial Resource Strain:    • Difficulty of Paying Living Expenses:    Food Insecurity:    • Worried About Running Out of Food in the Last Year:    • Ran Out of Food in the Last Year:    Transportation Needs:    • Lack of Transportation (Medical):    • Lack of Transportation (Non-Medical):    Physical Activity:    • Days of Exercise per Week:    • Minutes of Exercise per Session:    Stress:    • Feeling of Stress :    Social Connections:    • Frequency of Communication with Friends and Family:    • Frequency of Social Gatherings with Friends and Family:    • Attends [employer] Services:    • Active Member of Clubs or Organizations:    • Attends Club or Organization Meetings:    • Marital Status:    Intimate Partner Violence:    • Fear of Current or Ex-Partner:    • Emotionally Abused:    • Physically Abused:    • Sexually Abused:          Physical Exam:  Ambulatory Vitals  /78 (BP Location: Left arm, Patient Position: Sitting, BP Cuff Size: Adult)   Pulse 67   Ht 1.854 m (6' 1\")   Wt 104 kg (230 lb)   SpO2 96%    BP Readings from Last 4 Encounters:   06/18/21 132/78   05/26/21 (!) 183/77   05/20/21 138/76   03/25/21 138/68     Weight/BMI:   Vitals:    06/18/21 0842   BP: 132/78   Weight: 104 kg (230 lb)   Height: 1.854 m (6' 1\")    Body mass index is 30.34 kg/m².  Wt Readings from Last 4 Encounters:   06/18/21 104 kg (230 lb)   05/26/21 105 kg (232 lb)   05/20/21 105 kg (232 lb)   03/25/21 103 kg (227 lb 1.2 oz)       Physical " Exam  Constitutional:       General: He is not in acute distress.  HENT:      Head: Normocephalic and atraumatic.   Eyes:      Conjunctiva/sclera: Conjunctivae normal.      Pupils: Pupils are equal, round, and reactive to light.   Neck:      Vascular: No JVD.   Cardiovascular:      Rate and Rhythm: Normal rate and regular rhythm.      Heart sounds: Normal heart sounds. No murmur heard.   No friction rub. No gallop.    Pulmonary:      Effort: Pulmonary effort is normal. No respiratory distress.      Breath sounds: Normal breath sounds. No wheezing or rales.   Chest:      Chest wall: No tenderness.   Abdominal:      General: Bowel sounds are normal. There is no distension.      Palpations: Abdomen is soft.   Musculoskeletal:      Cervical back: Normal range of motion and neck supple.   Skin:     General: Skin is warm and dry.   Neurological:      Mental Status: He is alert and oriented to person, place, and time.   Psychiatric:         Mood and Affect: Affect normal.         Judgment: Judgment normal.         Lab Data Review:  Lab Results   Component Value Date/Time    CHOLSTRLTOT 103 02/02/2021 02:11 PM    LDL 37 02/02/2021 02:11 PM    HDL 53 02/02/2021 02:11 PM    TRIGLYCERIDE 66 02/02/2021 02:11 PM       Lab Results   Component Value Date/Time    SODIUM 141 05/26/2021 11:25 AM    POTASSIUM 3.8 05/26/2021 11:25 AM    CHLORIDE 106 05/26/2021 11:25 AM    CO2 24 05/26/2021 11:25 AM    GLUCOSE 116 (H) 05/26/2021 11:25 AM    BUN 15 05/26/2021 11:25 AM    CREATININE 0.69 05/26/2021 11:25 AM     CrCl cannot be calculated (Patient's most recent lab result is older than the maximum 7 days allowed.).  Lab Results   Component Value Date/Time    ALKPHOSPHAT 91 05/26/2021 11:25 AM    ASTSGOT 43 05/26/2021 11:25 AM    ALTSGPT 45 05/26/2021 11:25 AM    TBILIRUBIN 0.6 05/26/2021 11:25 AM      Lab Results   Component Value Date/Time    WBC 11.5 (H) 05/26/2021 11:25 AM     Lab Results   Component Value Date/Time    HBA1C 6.1 (H)  03/22/2021 09:25 AM     No components found for: TROP    Outside labs 06/2020  Tot chol 128, trig 40, HDL 57, LDL 63  U/a wnl, a1c 5.7, cbc wnl, bmp wnl, micro alb / creat <3.6 mg/g wnl, TSH wnl    Outside labs 6/2021  -LDL 73, HDL 58, tot chol 145, trig 72    Cardiac Imaging and Procedures Review:      Carotid doppler 7/29/20  FINDINGS   RIGHT:   Moderate appearing calcific plaque of the bifurcation extending into the    internal carotid yet velocities are consistent with < 50% stenosis of the    internal carotid artery.    LEFT:   Patent internal carotid artery with no hemodynamically significant plaque    following endarterectomy.   Bilateral subclavian and vertebral artery waveforms are antegrade and    waveforms are normal in character and velocity.     [facility]I 2/2018 outside study  1. Quality of study: good.  2. There s is TID.  3. The LV volumes are enlarged..  4. SPECT images demonstrate infarct of the inferolateral wall with minor ama-infarct ischemia.  5. The gate stress LVEF is 53%    [facility] 04/2018 outside study  Procedure Performed:  1. R femoral artery access  2. Left heart catheterization with selective coronary angiography  3. PCI ostial RCA - XIENCE 2.25 x 12mm post dilated NC trek 2.5 x 12 to 20 narciso  4. Perclose R CFA  Summary of Findings:  1. 99.9% ostial RCA  2. Left main no disease  3. LAD minimal disease  4. LCx no disease  5. LVEDP 8mmHg  6. Chronic dissection R external iliac artery dual lumen    Carotid artery ultrasound 2013 [facility] outside study  1.  Compared to carotid arterial ultrasonography performed 2/15/2012-     a)   Left carotid arterial bifurcation has returned to normal  appearance status post endarterectomy.     b)   Mild-moderate degree right carotid bulb and proximal right  internal carotid arterial plaque without evidence of hemodynamically  significant disease is present and not substantially changed.    c)  There remains normal antegrade flow within the cervical right  and left  vertebral arteries.    CTA neck s/p CEA 2012  1. Postoperative change compatible with interval left carotid  endarterectomy. The left common and internal carotid arteries appear  patent along their course.  2. Focal dissection flap is present within the proximal left external  carotid artery, without evidence of distal occlusion.  3. Stable atherosclerotic disease and stenosis of the right common  and internal carotid arteries, as described above.  4. Stable atherosclerotic disease of the origin of the right  vertebral artery.    TTE 2012  Interpretation Summary  1.  Negative contrast study for interatrial shunt. 2.    Mild LVH with normal LV size and systolic function. No   significant valvular abnormality noted. 3.  No prior   study for comparison.    CTA neck 2/2021  1.  Severe proximal/mid right internal carotid artery calcified plaque with 65-70% diameter stenosis.  2.  No significant left carotid artery stenosis.  3.  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  4.  Severe stenosis at the origin of the right vertebral artery.  5.  Moderate proximal left vertebral artery stenosis.    CTA head 2/2021  1.  Moderate chronic left MCA territory infarct with asymmetrically small M2 segment left MCA branch.  2.  No large vessel occlusion or aneurysm.    TTE 4/19/21  CONCLUSIONS  Fair quality study, improved with contrast.  Negative bubble study including Valsalva.  Mildly reduced LV systolic function, LV EF   50% with [id number]   variability due to frequent ectopy.  Wall motion is difficult to assess due to beat to beat variation, no   clifton wall motion abnormalities.  Abnormal septal motion consistent with conduction delay.  Right ventricle not well visualized, grossly normal size and function.  Probably mild mitral annular calcification.  The aortic valve is not well visualized, probably trileaflet  with   sclerosis.  Unable to estimate RVSP/RAP.    Medical Decision Making:  Problem List Items Addressed This Visit     Essential hypertension    Relevant Medications    atorvastatin (LIPITOR) 80 MG tablet    Bilateral carotid artery stenosis    Relevant Medications    atorvastatin (LIPITOR) 80 MG tablet    Coronary artery disease involving native heart without angina pectoris    Relevant Medications    atorvastatin (LIPITOR) 80 MG tablet    Mixed hyperlipidemia    Relevant Medications    atorvastatin (LIPITOR) 80 MG tablet    Paroxysmal atrial fibrillation (HCC)    Relevant Medications    atorvastatin (LIPITOR) 80 MG tablet    TIA (transient ischemic attack)    Relevant Medications    atorvastatin (LIPITOR) 80 MG tablet        CAD / HLD / hx CVA w carotid stenosis - Continue doac. Agree with addition of aspirin given carotid disease. LDL goal <70. Increase atorvastatin to 80mg.     Paroxysmal AF - chadsvasc 6 - continue xarelto for cva prevention. Continue metoprolol for rate control.     HTN - controlled at home. Goal 120/80. Continue current regimen.    It was my pleasure to meet with Mr. Blount.

## 2021-06-18 NOTE — PATIENT COMMUNICATION
You  Juice Davis M.D. 2 hours ago (12:16 PM)     Ok to schedule colonoscopy now? Low, medium, or high risk for when they send me the clearance request? Ok to hold Xarelto prior?   Thanks!       Juice Davis M.D.  You 1 hour ago (12:45 PM)     Yea should be ok to schedule colonoscopy. Will need bridging. Either can do so with lovenox or easier just to hold one day prior. Depending on GI choice. His chadsvasc is pretty high. Thanks Melissa!

## 2021-06-18 NOTE — PATIENT INSTRUCTIONS
Fatigue  If you have fatigue, you feel tired all the time and have a lack of energy or a lack of motivation. Fatigue may make it difficult to start or complete tasks because of exhaustion. In general, occasional or mild fatigue is often a normal response to activity or life. However, long-lasting (chronic) or extreme fatigue may be a symptom of a medical condition.  Follow these instructions at home:  General instructions  · Watch your fatigue for any changes.  · Go to bed and get up at the same time every day.  · Avoid fatigue by pacing yourself during the day and getting enough sleep at night.  · Maintain a healthy weight.  Medicines  · Take over-the-counter and prescription medicines only as told by your health care provider.  · Take a multivitamin, if told by your health care provider.   · Do not use herbal or dietary supplements unless they are approved by your health care provider.  Activity    · Exercise regularly, as told by your health care provider.  · Use or practice techniques to help you relax, such as yoga, rocky chi, meditation, or massage therapy.  Eating and drinking    · Avoid heavy meals in the evening.  · Eat a well-balanced diet, which includes lean proteins, whole grains, plenty of fruits and vegetables, and low-fat dairy products.  · Avoid consuming too much caffeine.  · Avoid the use of alcohol.  · Drink enough fluid to keep your urine pale yellow.  Lifestyle  · Change situations that cause you stress. Try to keep your work and personal schedule in balance.  · Do not use any products that contain nicotine or tobacco, such as cigarettes and e-cigarettes. If you need help quitting, ask your health care provider.  · Do not use drugs.  Contact a health care provider if:  · Your fatigue does not get better.  · You have a fever.  · You suddenly lose or gain weight.  · You have headaches.  · You have trouble falling asleep or sleeping through the night.  · You feel angry, guilty, anxious, or  sad.  · You are unable to have a bowel movement (constipation).  · Your skin is dry.  · You have swelling in your legs or another part of your body.  Get help right away if:  · You feel confused.  · Your vision is blurry.  · You feel faint or you pass out.  · You have a severe headache.  · You have severe pain in your abdomen, your back, or the area between your waist and hips (pelvis).  · You have chest pain, shortness of breath, or an irregular or fast heartbeat.  · You are unable to urinate, or you urinate less than normal.  · You have abnormal bleeding, such as bleeding from the rectum, vagina, nose, lungs, or nipples.  · You vomit blood.  · You have thoughts about hurting yourself or others.  If you ever feel like you may hurt yourself or others, or have thoughts about taking your own life, get help right away. You can go to your nearest emergency department or call:  · Your local emergency services (911 in the [place]  · A suicide crisis helpline, such as the National Suicide Prevention Lifeline at 1-774.797.6285. This is open 24 hours a day.  Summary  · If you have fatigue, you feel tired all the time and have a lack of energy or a lack of motivation.  · Fatigue may make it difficult to start or complete tasks because of exhaustion.  · Long-lasting (chronic) or extreme fatigue may be a symptom of a medical condition.  · Exercise regularly, as told by your health care provider.  · Change situations that cause you stress. Try to keep your work and personal schedule in balance.  This information is not intended to replace advice given to you by your health care provider. Make sure you discuss any questions you have with your health care provider.  Document Released: 10/14/2008 Document Revised: 04/09/2020 Document Reviewed: 09/12/2018  Elsevier Patient Education © 2020 [employer]

## 2021-06-25 ENCOUNTER — HOSPITAL ENCOUNTER (OUTPATIENT)
Dept: HOSPITAL 8 - WOUND | Age: 76
Discharge: HOME | End: 2021-06-25
Attending: INTERNAL MEDICINE
Payer: MEDICARE

## 2021-06-25 DIAGNOSIS — E78.2: ICD-10-CM

## 2021-06-25 DIAGNOSIS — N40.0: ICD-10-CM

## 2021-06-25 DIAGNOSIS — S61.411D: ICD-10-CM

## 2021-06-25 DIAGNOSIS — E78.5: ICD-10-CM

## 2021-06-25 DIAGNOSIS — Z87.891: ICD-10-CM

## 2021-06-25 DIAGNOSIS — M19.90: ICD-10-CM

## 2021-06-25 DIAGNOSIS — I48.91: ICD-10-CM

## 2021-06-25 DIAGNOSIS — S01.81XD: ICD-10-CM

## 2021-06-25 DIAGNOSIS — S06.0X1D: ICD-10-CM
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
## 2021-06-25 DIAGNOSIS — F41.9: ICD-10-CM

## 2021-06-25 DIAGNOSIS — Z85.828: ICD-10-CM

## 2021-06-25 DIAGNOSIS — S41.111D: Primary | ICD-10-CM

## 2021-06-25 DIAGNOSIS — G47.00: ICD-10-CM

## 2021-06-25 DIAGNOSIS — I10: ICD-10-CM

## 2021-06-25 DIAGNOSIS — Z86.73: ICD-10-CM

## 2021-06-25 PROCEDURE — G0463 HOSPITAL OUTPT CLINIC VISIT: HCPCS

## 2021-08-02 ENCOUNTER — APPOINTMENT (OUTPATIENT)
Dept: RADIOLOGY | Facility: MEDICAL CENTER | Age: 76
DRG: 069 | End: 2021-08-02
Attending: EMERGENCY MEDICINE
Payer: MEDICARE

## 2021-08-02 ENCOUNTER — APPOINTMENT (OUTPATIENT)
Dept: RADIOLOGY | Facility: MEDICAL CENTER | Age: 76
DRG: 069 | End: 2021-08-02
Attending: STUDENT IN AN ORGANIZED HEALTH CARE EDUCATION/TRAINING PROGRAM
Payer: MEDICARE

## 2021-08-02 ENCOUNTER — HOSPITAL ENCOUNTER (INPATIENT)
Facility: MEDICAL CENTER | Age: 76
LOS: 1 days | DRG: 069 | End: 2021-08-03
Attending: EMERGENCY MEDICINE | Admitting: STUDENT IN AN ORGANIZED HEALTH CARE EDUCATION/TRAINING PROGRAM
Payer: MEDICARE

## 2021-08-02 DIAGNOSIS — G45.9 TIA (TRANSIENT ISCHEMIC ATTACK): ICD-10-CM

## 2021-08-02 LAB
ABO + RH BLD: NORMAL
ABO GROUP BLD: NORMAL
ALBUMIN SERPL BCP-MCNC: 3.9 G/DL (ref 3.2–4.9)
ALBUMIN/GLOB SERPL: 1.3 G/DL
ALP SERPL-CCNC: 105 U/L (ref 30–99)
ALT SERPL-CCNC: 26 U/L (ref 2–50)
ANION GAP SERPL CALC-SCNC: 14 MMOL/L (ref 7–16)
APTT PPP: 38.3 SEC (ref 24.7–36)
AST SERPL-CCNC: 33 U/L (ref 12–45)
BASOPHILS # BLD AUTO: 0.7 % (ref 0–1.8)
BASOPHILS # BLD: 0.06 K/UL (ref 0–0.12)
BILIRUB SERPL-MCNC: 0.4 MG/DL (ref 0.1–1.5)
BLD GP AB SCN SERPL QL: NORMAL
BUN SERPL-MCNC: 14 MG/DL (ref 8–22)
CALCIUM SERPL-MCNC: 9.2 MG/DL (ref 8.5–10.5)
CHLORIDE SERPL-SCNC: 103 MMOL/L (ref 96–112)
CO2 SERPL-SCNC: 23 MMOL/L (ref 20–33)
CREAT SERPL-MCNC: 0.7 MG/DL (ref 0.5–1.4)
EOSINOPHIL # BLD AUTO: 0.28 K/UL (ref 0–0.51)
EOSINOPHIL NFR BLD: 3.2 % (ref 0–6.9)
ERYTHROCYTE [DISTWIDTH] IN BLOOD BY AUTOMATED COUNT: 44 FL (ref 35.9–50)
EST. AVERAGE GLUCOSE BLD GHB EST-MCNC: 114 MG/DL
GLOBULIN SER CALC-MCNC: 2.9 G/DL (ref 1.9–3.5)
GLUCOSE SERPL-MCNC: 104 MG/DL (ref 65–99)
HBA1C MFR BLD: 5.6 % (ref 4–5.6)
HCT VFR BLD AUTO: 41.9 % (ref 42–52)
HGB BLD-MCNC: 14.1 G/DL (ref 14–18)
IMM GRANULOCYTES # BLD AUTO: 0.02 K/UL (ref 0–0.11)
IMM GRANULOCYTES NFR BLD AUTO: 0.2 % (ref 0–0.9)
INR PPP: 1.4 (ref 0.87–1.13)
LYMPHOCYTES # BLD AUTO: 2.73 K/UL (ref 1–4.8)
LYMPHOCYTES NFR BLD: 31.1 % (ref 22–41)
MCH RBC QN AUTO: 32.9 PG (ref 27–33)
MCHC RBC AUTO-ENTMCNC: 33.7 G/DL (ref 33.7–35.3)
MCV RBC AUTO: 97.9 FL (ref 81.4–97.8)
MONOCYTES # BLD AUTO: 1.05 K/UL (ref 0–0.85)
MONOCYTES NFR BLD AUTO: 12 % (ref 0–13.4)
NEUTROPHILS # BLD AUTO: 4.63 K/UL (ref 1.82–7.42)
NEUTROPHILS NFR BLD: 52.8 % (ref 44–72)
NRBC # BLD AUTO: 0 K/UL
NRBC BLD-RTO: 0 /100 WBC
PLATELET # BLD AUTO: 201 K/UL (ref 164–446)
PMV BLD AUTO: 10.5 FL (ref 9–12.9)
POTASSIUM SERPL-SCNC: 3.6 MMOL/L (ref 3.6–5.5)
PROT SERPL-MCNC: 6.8 G/DL (ref 6–8.2)
PROTHROMBIN TIME: 16.8 SEC (ref 12–14.6)
RBC # BLD AUTO: 4.28 M/UL (ref 4.7–6.1)
RH BLD: NORMAL
SODIUM SERPL-SCNC: 140 MMOL/L (ref 135–145)
TROPONIN T SERPL-MCNC: 26 NG/L (ref 6–19)
TSH SERPL DL<=0.005 MIU/L-ACNC: 2.01 UIU/ML (ref 0.38–5.33)
WBC # BLD AUTO: 8.8 K/UL (ref 4.8–10.8)

## 2021-08-02 PROCEDURE — 71045 X-RAY EXAM CHEST 1 VIEW: CPT

## 2021-08-02 PROCEDURE — 85730 THROMBOPLASTIN TIME PARTIAL: CPT

## 2021-08-02 PROCEDURE — 93005 ELECTROCARDIOGRAM TRACING: CPT | Performed by: EMERGENCY MEDICINE

## 2021-08-02 PROCEDURE — 85025 COMPLETE CBC W/AUTO DIFF WBC: CPT

## 2021-08-02 PROCEDURE — 86850 RBC ANTIBODY SCREEN: CPT

## 2021-08-02 PROCEDURE — 84484 ASSAY OF TROPONIN QUANT: CPT

## 2021-08-02 PROCEDURE — 70551 MRI BRAIN STEM W/O DYE: CPT | Mod: ME

## 2021-08-02 PROCEDURE — 70496 CT ANGIOGRAPHY HEAD: CPT | Mod: MG

## 2021-08-02 PROCEDURE — 86900 BLOOD TYPING SEROLOGIC ABO: CPT

## 2021-08-02 PROCEDURE — 99223 1ST HOSP IP/OBS HIGH 75: CPT | Mod: AI | Performed by: STUDENT IN AN ORGANIZED HEALTH CARE EDUCATION/TRAINING PROGRAM

## 2021-08-02 PROCEDURE — 96374 THER/PROPH/DIAG INJ IV PUSH: CPT

## 2021-08-02 PROCEDURE — 99285 EMERGENCY DEPT VISIT HI MDM: CPT

## 2021-08-02 PROCEDURE — 99223 1ST HOSP IP/OBS HIGH 75: CPT | Performed by: PSYCHIATRY & NEUROLOGY

## 2021-08-02 PROCEDURE — 83036 HEMOGLOBIN GLYCOSYLATED A1C: CPT

## 2021-08-02 PROCEDURE — 0042T CT-CEREBRAL PERFUSION ANALYSIS: CPT

## 2021-08-02 PROCEDURE — 700111 HCHG RX REV CODE 636 W/ 250 OVERRIDE (IP): Performed by: STUDENT IN AN ORGANIZED HEALTH CARE EDUCATION/TRAINING PROGRAM

## 2021-08-02 PROCEDURE — 700117 HCHG RX CONTRAST REV CODE 255: Performed by: EMERGENCY MEDICINE

## 2021-08-02 PROCEDURE — 70450 CT HEAD/BRAIN W/O DYE: CPT | Mod: MG

## 2021-08-02 PROCEDURE — 84443 ASSAY THYROID STIM HORMONE: CPT

## 2021-08-02 PROCEDURE — 86901 BLOOD TYPING SEROLOGIC RH(D): CPT

## 2021-08-02 PROCEDURE — 85610 PROTHROMBIN TIME: CPT

## 2021-08-02 PROCEDURE — 770020 HCHG ROOM/CARE - TELE (206)

## 2021-08-02 PROCEDURE — 80053 COMPREHEN METABOLIC PANEL: CPT

## 2021-08-02 PROCEDURE — 70498 CT ANGIOGRAPHY NECK: CPT | Mod: MG

## 2021-08-02 RX ORDER — ASPIRIN 300 MG/1
300 SUPPOSITORY RECTAL DAILY
Status: DISCONTINUED | OUTPATIENT
Start: 2021-08-03 | End: 2021-08-02

## 2021-08-02 RX ORDER — ZOLPIDEM TARTRATE 10 MG/1
10 TABLET ORAL NIGHTLY PRN
COMMUNITY
End: 2022-04-24

## 2021-08-02 RX ORDER — ASPIRIN 81 MG/1
324 TABLET, CHEWABLE ORAL DAILY
Status: DISCONTINUED | OUTPATIENT
Start: 2021-08-02 | End: 2021-08-03

## 2021-08-02 RX ORDER — ATORVASTATIN CALCIUM 80 MG/1
80 TABLET, FILM COATED ORAL
Status: DISCONTINUED | OUTPATIENT
Start: 2021-08-02 | End: 2021-08-03 | Stop reason: HOSPADM

## 2021-08-02 RX ORDER — M-VIT,TX,IRON,MINS/CALC/FOLIC 27MG-0.4MG
1 TABLET ORAL DAILY
COMMUNITY

## 2021-08-02 RX ORDER — ASPIRIN 325 MG
325 TABLET ORAL ONCE
Status: DISCONTINUED | OUTPATIENT
Start: 2021-08-02 | End: 2021-08-02

## 2021-08-02 RX ORDER — ASPIRIN 300 MG/1
300 SUPPOSITORY RECTAL DAILY
Status: DISCONTINUED | OUTPATIENT
Start: 2021-08-02 | End: 2021-08-03

## 2021-08-02 RX ORDER — HYDROXYCHLOROQUINE SULFATE 200 MG/1
200 TABLET, FILM COATED ORAL 2 TIMES DAILY
COMMUNITY
End: 2023-06-15

## 2021-08-02 RX ORDER — ASPIRIN 325 MG
325 TABLET ORAL DAILY
Status: DISCONTINUED | OUTPATIENT
Start: 2021-08-03 | End: 2021-08-02

## 2021-08-02 RX ORDER — ASPIRIN 325 MG
325 TABLET ORAL DAILY
Status: DISCONTINUED | OUTPATIENT
Start: 2021-08-02 | End: 2021-08-03

## 2021-08-02 RX ORDER — LORAZEPAM 2 MG/ML
1 INJECTION INTRAMUSCULAR
Status: COMPLETED | OUTPATIENT
Start: 2021-08-02 | End: 2021-08-02

## 2021-08-02 RX ORDER — CHLORAL HYDRATE 500 MG
1000 CAPSULE ORAL DAILY
COMMUNITY

## 2021-08-02 RX ORDER — ASPIRIN 81 MG/1
324 TABLET, CHEWABLE ORAL DAILY
Status: DISCONTINUED | OUTPATIENT
Start: 2021-08-03 | End: 2021-08-02

## 2021-08-02 RX ADMIN — IOHEXOL 40 ML: 350 INJECTION, SOLUTION INTRAVENOUS at 20:19

## 2021-08-02 RX ADMIN — IOHEXOL 80 ML: 350 INJECTION, SOLUTION INTRAVENOUS at 20:21

## 2021-08-02 RX ADMIN — LORAZEPAM 1 MG: 2 INJECTION INTRAMUSCULAR; INTRAVENOUS at 23:12

## 2021-08-02 ASSESSMENT — FIBROSIS 4 INDEX
FIB4 SCORE: 2.45
FIB4 SCORE: 2.78

## 2021-08-02 ASSESSMENT — PAIN DESCRIPTION - PAIN TYPE: TYPE: ACUTE PAIN

## 2021-08-03 ENCOUNTER — APPOINTMENT (OUTPATIENT)
Dept: CARDIOLOGY | Facility: MEDICAL CENTER | Age: 76
DRG: 069 | End: 2021-08-03
Attending: INTERNAL MEDICINE
Payer: MEDICARE

## 2021-08-03 ENCOUNTER — PATIENT OUTREACH (OUTPATIENT)
Dept: HEALTH INFORMATION MANAGEMENT | Facility: OTHER | Age: 76
End: 2021-08-03

## 2021-08-03 VITALS
DIASTOLIC BLOOD PRESSURE: 75 MMHG | OXYGEN SATURATION: 93 % | WEIGHT: 243.6 LBS | HEART RATE: 71 BPM | TEMPERATURE: 98 F | BODY MASS INDEX: 32.29 KG/M2 | RESPIRATION RATE: 16 BRPM | SYSTOLIC BLOOD PRESSURE: 159 MMHG | HEIGHT: 73 IN

## 2021-08-03 LAB
CHOLEST SERPL-MCNC: 121 MG/DL (ref 100–199)
HDLC SERPL-MCNC: 58 MG/DL
LDLC SERPL CALC-MCNC: 48 MG/DL
LV EJECT FRACT  99904: 45
LV EJECT FRACT MOD 2C 99903: 49.01
LV EJECT FRACT MOD 4C 99902: 51.23
LV EJECT FRACT MOD BP 99901: 49.84
TRIGL SERPL-MCNC: 74 MG/DL (ref 0–149)

## 2021-08-03 PROCEDURE — 99239 HOSP IP/OBS DSCHRG MGMT >30: CPT | Performed by: INTERNAL MEDICINE

## 2021-08-03 PROCEDURE — 99231 SBSQ HOSP IP/OBS SF/LOW 25: CPT | Mod: GC | Performed by: PSYCHIATRY & NEUROLOGY

## 2021-08-03 PROCEDURE — 97535 SELF CARE MNGMENT TRAINING: CPT

## 2021-08-03 PROCEDURE — 93306 TTE W/DOPPLER COMPLETE: CPT

## 2021-08-03 PROCEDURE — 97165 OT EVAL LOW COMPLEX 30 MIN: CPT

## 2021-08-03 PROCEDURE — 36415 COLL VENOUS BLD VENIPUNCTURE: CPT

## 2021-08-03 PROCEDURE — A9270 NON-COVERED ITEM OR SERVICE: HCPCS | Performed by: STUDENT IN AN ORGANIZED HEALTH CARE EDUCATION/TRAINING PROGRAM

## 2021-08-03 PROCEDURE — 80061 LIPID PANEL: CPT

## 2021-08-03 PROCEDURE — 92610 EVALUATE SWALLOWING FUNCTION: CPT

## 2021-08-03 PROCEDURE — 700102 HCHG RX REV CODE 250 W/ 637 OVERRIDE(OP): Performed by: STUDENT IN AN ORGANIZED HEALTH CARE EDUCATION/TRAINING PROGRAM

## 2021-08-03 PROCEDURE — 93306 TTE W/DOPPLER COMPLETE: CPT | Mod: 26 | Performed by: INTERNAL MEDICINE

## 2021-08-03 RX ORDER — HYDROXYCHLOROQUINE SULFATE 200 MG/1
200 TABLET, FILM COATED ORAL DAILY
Status: DISCONTINUED | OUTPATIENT
Start: 2021-08-03 | End: 2021-08-03 | Stop reason: HOSPADM

## 2021-08-03 RX ORDER — METOPROLOL SUCCINATE 25 MG/1
25 TABLET, EXTENDED RELEASE ORAL DAILY
Status: DISCONTINUED | OUTPATIENT
Start: 2021-08-03 | End: 2021-08-03 | Stop reason: HOSPADM

## 2021-08-03 RX ADMIN — ASPIRIN 81 MG: 81 TABLET, COATED ORAL at 10:08

## 2021-08-03 RX ADMIN — METOPROLOL SUCCINATE 25 MG: 25 TABLET, EXTENDED RELEASE ORAL at 10:08

## 2021-08-03 RX ADMIN — HYDROXYCHLOROQUINE SULFATE 200 MG: 200 TABLET ORAL at 10:07

## 2021-08-03 ASSESSMENT — LIFESTYLE VARIABLES
CONSUMPTION TOTAL: NEGATIVE
TOTAL SCORE: 0
ON A TYPICAL DAY WHEN YOU DRINK ALCOHOL HOW MANY DRINKS DO YOU HAVE: 2
EVER HAD A DRINK FIRST THING IN THE MORNING TO STEADY YOUR NERVES TO GET RID OF A HANGOVER: NO
TOTAL SCORE: 0
AVERAGE NUMBER OF DAYS PER WEEK YOU HAVE A DRINK CONTAINING ALCOHOL: 3
HAVE YOU EVER FELT YOU SHOULD CUT DOWN ON YOUR DRINKING: NO
HAVE PEOPLE ANNOYED YOU BY CRITICIZING YOUR DRINKING: NO
TOTAL SCORE: 0
HOW MANY TIMES IN THE PAST YEAR HAVE YOU HAD 5 OR MORE DRINKS IN A DAY: 0
EVER FELT BAD OR GUILTY ABOUT YOUR DRINKING: NO
ALCOHOL_USE: YES
DOES PATIENT WANT TO STOP DRINKING: NO

## 2021-08-03 ASSESSMENT — COGNITIVE AND FUNCTIONAL STATUS - GENERAL
SUGGESTED CMS G CODE MODIFIER MOBILITY: CH
DAILY ACTIVITIY SCORE: 24
MOBILITY SCORE: 24
MOBILITY SCORE: 24
DAILY ACTIVITIY SCORE: 24
SUGGESTED CMS G CODE MODIFIER DAILY ACTIVITY: CH
SUGGESTED CMS G CODE MODIFIER MOBILITY: CH
SUGGESTED CMS G CODE MODIFIER DAILY ACTIVITY: CH

## 2021-08-03 ASSESSMENT — ACTIVITIES OF DAILY LIVING (ADL): TOILETING: INDEPENDENT

## 2021-08-03 ASSESSMENT — GAIT ASSESSMENTS
GAIT LEVEL OF ASSIST: MODIFIED INDEPENDENT
DISTANCE (FEET): 350

## 2021-08-03 ASSESSMENT — ENCOUNTER SYMPTOMS
CHILLS: 0
PALPITATIONS: 0
NAUSEA: 0
COUGH: 0
BRUISES/BLEEDS EASILY: 0
MYALGIAS: 0
HEARTBURN: 0
NECK PAIN: 0
FEVER: 0
DEPRESSION: 0
HEADACHES: 0
BLURRED VISION: 0
HEMOPTYSIS: 0
DIZZINESS: 0
DOUBLE VISION: 0

## 2021-08-03 ASSESSMENT — CHA2DS2 SCORE
VASCULAR DISEASE: YES
AGE 65 TO 74: NO
SEX: MALE
HYPERTENSION: YES
DIABETES: NO
CHF OR LEFT VENTRICULAR DYSFUNCTION: NO
AGE 75 OR GREATER: YES
PRIOR STROKE OR TIA OR THROMBOEMBOLISM: YES
CHA2DS2 VASC SCORE: 6

## 2021-08-03 ASSESSMENT — PATIENT HEALTH QUESTIONNAIRE - PHQ9
2. FEELING DOWN, DEPRESSED, IRRITABLE, OR HOPELESS: NOT AT ALL
1. LITTLE INTEREST OR PLEASURE IN DOING THINGS: NOT AT ALL
SUM OF ALL RESPONSES TO PHQ9 QUESTIONS 1 AND 2: 0

## 2021-08-03 ASSESSMENT — FIBROSIS 4 INDEX: FIB4 SCORE: 2.45

## 2021-08-03 NOTE — PROGRESS NOTES
Patient discharged to home. Patient is alert and oriented x4. IV removed. Discharge instructions given and patient verbalized understanding. Signed document in chart. All belongings went with patient.

## 2021-08-03 NOTE — ED TRIAGE NOTES
Chief Complaint   Patient presents with   • Possible Stroke     BIBA from home after patient began to have a right sided droop and expressive aphagia. PMH of stroke and TIA, on xarelto. Stroke alert on arrival

## 2021-08-03 NOTE — THERAPY
Speech Language Pathology   Clinical Swallow Evaluation     Patient Name: Carl Bloutn  AGE:  76 y.o., SEX:  male  Medical Record #: [id number]  Today's Date: 8/3/2021     Precautions  Precautions: Fall Risk, Swallow Precautions ( See Comments)  Comments: Mild unsteadiness with 2 LOB; self corrected; reports this is baseline      The pt is a 75 y/o M who was admitted 08/02/21.  The pt presented with aphasia and R facial droop that lasted approx 30 minutes and cleared upon arrival to the ED.    PMHx:  Afib, HTN, HLD, LMCA stroke (2012)    Brain MRI 08/02 - 1.  No acute abnormality.  2.  A small area of encephalomalacia in the left frontal lobe.  3.  Mild cerebral atrophy.  4.  Mild chronic microvascular ischemic disease.    CXR 08/02 - 1.  Minimal lingular atelectasis.  2.  Atherosclerotic plaque.     Report of Problem:  Pt and wife denied any issues with swallowing. No dysphagia reported from previous CVA. Pt was NPO pending CSE. Pt reported expressive aphasia, lasting approx 30 min, however this has now resolved.    Assessment  Exam completed upright in ward's position. OME revealed a subtle R central facial asymmetry at rest with minimally impaired labial movement and subtle R lingual deviation. Observations appear c/w previous stroke.  Missing posterior dentition on left lower bridge.  Pt self regulated trials of thin liquids by cup/straw, puree and 1/2 saltine cracker textures. Appropriate oral acceptance and sufficient containment without labial escape. Adequate bite and mastication of the saltine crackers. No oral stasis nor pocketing. No overt signs of aspiration noted across all consistencies. Hyolaryngeal movement was present. Voice was dry pre and post oral intake.    The pt presents with a functional oropharyngeal swallow. No clinical signs of dysphagia noted across all consistencies at bedside. Aspiration risk from oral intake is low without indication for a modified diet. Silent aspiration  cannot be ruled out at bedside however is not suspected at this time.    RECOMMENDATIONS:  1.  Regular solids, thin liquids  2.  No further acute speech pathology services are indicated at this time. Please reconsult if the pt's status changes.    Results and recs d/w pt, wife and RN. Thank you.    Plan  Recommend Speech Therapy for Evaluation only for the following treatments:  Dysphagia Training.    Discharge Recommendations: Anticipate that the patient will have no further speech therapy needs after discharge from the hospital    Subjective    The pt was awake, alert and cooperative during the entirety of the evaluation. His wife was present at b/s.      Objective     08/03/21 0958   Total Time Spent   Total Time Spent (Mins) 14   Charge Group   SLP Oral Pharyngeal Evaluation Oral Pharyngeal Evaluation   Initial Contact Note    Initial Contact Note  Order Received and Verified, Speech Therapy Evaluation in Progress with Full Report to Follow.   Precautions   Precautions Fall Risk;Swallow Precautions ( See Comments)   Vitals   O2 (LPM) 0   O2 Delivery Device None - Room Air   Pain 0 - 10 Group   Pain Rating Scale (NPRS) 0   Therapist Pain Assessment Post Activity Pain Same as Prior to Activity  (No pain reported)   Prior Living Situation   Prior Services None   Housing / Facility [facility]   Lives with - Patient's Self Care Capacity Spouse   Prior Level Of Function   Communication Within Functional Limits   Swallow Within Functional Limits   Dentition Intact   Dentures None   Hearing Unknown   Hearing Aid None   Vision Within Functional Limits for Evaluation   Patient's Primary Language English   Occupation (Pre-Hospital Vocational) Retired Due To Age   Oral Motor Eval    Is Patient Able to Complete Oral Motor Eval Yes, Within Normal Limits   Labial Function   Labial Structure At Rest Minimal   Frown, Pucker Minimal   Lingual Function   Lingual Structure At Rest Minimal   Lingual Protrude Minimal   Jaw   Jaw  Structure At Rest Within Functional Limits   Bite ([place]) Within Functional Limits   Velar Function   Velar Structure At Rest Within Functional Limits   Laryngeal Function   Voice Quality Within Functional Limits   Oral Food Presentation   Ice Chips Within Functional Limits   Single Swallow Thin (0) Within Functional Limits   Serial Swallow Thin (0) Within Functional Limits   Pureed (4) Within Functional Limits   Regular (7) Within Functional Limits   Self Feeding Independent   Dysphagia Strategies / Recommendations   Strategies / Interventions Recommended (Yes / No) Yes   Compensatory Strategies None   Diet / Liquid Recommendation Thin (0);Regular (7)   Medication Administration  Whole with Liquid Wash   Therapy Interventions None   Education Group   Education Provided Role of Speech Therapy;Dysphagia   Dysphagia Patient Response Patient;Family;Eager;Acceptance;Explanation;Verbal Demonstration   Role of SLP Patient Response Patient;Family;Eager;Acceptance;Explanation;Verbal Demonstration   Anticipated Discharge Needs   Discharge Recommendations Anticipate that the patient will have no further speech therapy needs after discharge from the hospital   Therapy Recommendations Upon DC Not Indicated   Interdisciplinary Plan of Care Collaboration   IDT Collaboration with  Nursing   Patient Position at End of Therapy In Bed;Bed Alarm On;Tray Table within Reach;Phone within Reach;Family / Friend in Room

## 2021-08-03 NOTE — DISCHARGE SUMMARY
Discharge Summary    CHIEF COMPLAINT ON ADMISSION  Chief Complaint   Patient presents with   • Possible Stroke     BIBA from home after patient began to have a right sided droop and expressive aphagia. PMH of stroke and TIA, on xarelto. Stroke alert on arrival       Reason for Admission  Stroke     Admission Date  8/2/2021    CODE STATUS  Prior    HPI & HOSPITAL COURSE  This is a 76 y.o. male past medical history atrial fibrillation, hyperlipidemia, hypertension, left MCA stroke 2012 left lower extremity residual presented complaining of right-sided, expressive aphasia.  His symptoms resolved right away. he was not a candidate for TPA.  Neurology was consulted. CTA neck There is bilateral atherosclerosis with estimated greater than 70% stenosis of the right ICA origin and less than 50% stenosis of the left ICA origin.  CT head perfusion scan negative.  CTA had no large vessel occlusions.  MRI brain no acute findings.  Echocardiogram unremarkable. Per neurology TIA versus recrudescence possible seizure.  Patient is on max treatment with aspirin and Xarelto.  Neurology recommended continuing the same treatment and follow-up outpatient.  Discussed the plan with patient and his wife who was present all the time. They agreed with above plan and findings.     Therefore, he is discharged in good and stable condition to home with close outpatient follow-up.    The patient recovered much more quickly than anticipated on admission.    Discharge Date  08/03/2021    FOLLOW UP ITEMS POST DISCHARGE  None     DISCHARGE DIAGNOSES  Principal Problem:    TIA (transient ischemic attack) POA: Yes  Active Problems:    Essential hypertension POA: Yes    Mixed hyperlipidemia POA: Yes    Paroxysmal atrial fibrillation (HCC) POA: Yes    Obesity (BMI 30-39.9) POA: Yes  Resolved Problems:    * No resolved hospital problems. *      FOLLOW UP  Future Appointments   Date Time Provider Department Center   12/29/2021 11:00 AM Juice Davis M.D.  [facility] None     [facility]  [address] [facility] [address]  [phone]  Call  Please call [facility] to establish with a Primary Care Provider. Thank you,    Pcp Pt States None            MEDICATIONS ON DISCHARGE     Medication List      CONTINUE taking these medications      Instructions   aspirin 81 MG Chew chewable tablet  Commonly known as: ASA   Chew 81 mg at bedtime.  Dose: 81 mg     atorvastatin 80 MG tablet  Commonly known as: LIPITOR   Take 1 tablet by mouth at bedtime.  Dose: 80 mg     cetirizine 10 MG Tabs  Commonly known as: ZYRTEC   Take 10 mg by mouth every day.  Dose: 10 mg     cyanocobalamin 1000 MCG Tabs  Commonly known as: VITAMIN B12   Take 1,000 mcg by mouth every day.  Dose: 1,000 mcg     cyclobenzaprine 10 mg Tabs  Commonly known as: Flexeril   Take 10 mg by mouth 3 times a day as needed for Muscle Spasms.  Dose: 10 mg     fish oil 1000 MG Caps capsule   Take 1,000 mg by mouth every day.  Dose: 1,000 mg     hydroCHLOROthiazide 25 MG Tabs  Commonly known as: HYDRODIURIL   Take 25 mg by mouth every day.  Dose: 25 mg     hydroxychloroquine 200 MG Tabs  Commonly known as: Plaquenil   Take 200 mg by mouth every day.  Dose: 200 mg     lisinopril 20 MG Tabs  Commonly known as: PRINIVIL   Take 20 mg by mouth every day.  Dose: 20 mg     metoprolol SR 25 MG Tb24  Commonly known as: TOPROL XL   Take 1 tablet by mouth every day.  Dose: 25 mg     pantoprazole 40 MG Tbec  Commonly known as: PROTONIX   Take 40 mg by mouth every day.  Dose: 40 mg     rivaroxaban 20 MG Tabs tablet  Commonly known as: XARELTO   Take 20 mg by mouth with dinner.  Dose: 20 mg     tamsulosin 0.4 MG capsule  Commonly known as: FLOMAX   Take 0.4 mg by mouth at bedtime.  Dose: 0.4 mg     therapeutic multivitamin-minerals Tabs   Take 1 tablet by mouth every day.  Dose: 1 tablet     zolpidem 10 MG Tabs  Commonly known as: AMBIEN   Take 10 mg by mouth at bedtime as needed for  Sleep.  Dose: 10 mg            Allergies  No Known Allergies    DIET  No orders of the defined types were placed in this encounter.      ACTIVITY  As tolerated.  Weight bearing as tolerated    CONSULTATIONS  Neurology     PROCEDURES  None     LABORATORY  Lab Results   Component Value Date    SODIUM 140 08/02/2021    POTASSIUM 3.6 08/02/2021    CHLORIDE 103 08/02/2021    CO2 23 08/02/2021    GLUCOSE 104 (H) 08/02/2021    BUN 14 08/02/2021    CREATININE 0.70 08/02/2021        Lab Results   Component Value Date    WBC 8.8 08/02/2021    HEMOGLOBIN 14.1 08/02/2021    HEMATOCRIT 41.9 (L) 08/02/2021    PLATELETCT 201 08/02/2021        Total time of the discharge process exceeds 35 minutes.

## 2021-08-03 NOTE — H&P
Hospital Medicine History & Physical Note    Date of Service  8/2/2021    Primary Care Physician  Pcp Pt States None    Consultants  neurology    Specialist Names: Dr. Newton     Code Status  Full Code    Chief Complaint  Chief Complaint   Patient presents with   • Possible Stroke     BIBA from home after patient began to have a right sided droop and expressive aphagia. PMH of stroke and TIA, on xarelto. Stroke alert on arrival       History of Presenting Illness  Carl Blount is a 76 y.o. male who presented 8/2/2021 with word salad and right sided facial droop that started 6:30 PM today and lasted for approximately 30 minutes.  Patient denies any focal weakness. He denies any chest pain or shortness of breath.     In the ER, CTA head and neck negative for acute intracranial abnormality but did show an area left MCA territory with an cephalomalacia consistent with an old infarct.  Neurology was consulted who recommended admitting patient for TIA.    Patient was examined bedside is currently alert, awake, oriented x4 not in any apparent distress.  He currently does not have any focal neurological deficit.  Patient wishes to be full code at this time.    I discussed the plan of care with patient and family.    Review of Systems  Review of Systems   Constitutional: Negative for chills and fever.   HENT: Negative for hearing loss and tinnitus.    Eyes: Negative for blurred vision and double vision.   Respiratory: Negative for cough and hemoptysis.    Cardiovascular: Negative for chest pain and palpitations.   Gastrointestinal: Negative for heartburn and nausea.   Genitourinary: Negative for dysuria and urgency.   Musculoskeletal: Negative for myalgias and neck pain.   Skin: Negative for itching and rash.   Neurological: Negative for dizziness and headaches.   Endo/Heme/Allergies: Does not bruise/bleed easily.   Psychiatric/Behavioral: Negative for depression and suicidal ideas.       Past Medical History    has a past medical history of Afib (HCC), Hyperlipidemia, and Hypertension.    Surgical History  Reviewed and not pertinent     Family History  Reviewed and not pertinent    Family history reviewed with patient. There is no family history that is pertinent to the chief complaint.     Social History   reports that he has quit smoking. He has never used smokeless tobacco. He reports current alcohol use.    Allergies  No Known Allergies    Medications  Prior to Admission Medications   Prescriptions Last Dose Informant Patient Reported? Taking?   Omega-3 Fatty Acids (FISH OIL) 1000 MG Cap capsule 8/2/2021 at 0930 Patient Yes Yes   Sig: Take 1,000 mg by mouth every day.   aspirin (ASA) 81 MG Chew Tab chewable tablet 8/1/2021 at 2230 Patient Yes No   Sig: Chew 81 mg at bedtime.   atorvastatin (LIPITOR) 80 MG tablet 8/1/2021 at 2230 Patient No No   Sig: Take 1 tablet by mouth at bedtime.   cetirizine (ZYRTEC) 10 MG Tab 8/2/2021 at 0930 Patient Yes No   Sig: Take 10 mg by mouth every day.   cyanocobalamin (VITAMIN B12) 1000 MCG Tab 8/2/2021 at 0930 Patient Yes Yes   Sig: Take 1,000 mcg by mouth every day.   cyclobenzaprine (FLEXERIL) 10 MG Tab 8/1/2021 at 2230 Patient Yes No   Sig: Take 10 mg by mouth 3 times a day as needed for Muscle Spasms.   hydroCHLOROthiazide (HYDRODIURIL) 25 MG Tab 8/2/2021 at 0930 Patient Yes No   Sig: Take 25 mg by mouth every day.   hydroxychloroquine (PLAQUENIL) 200 MG Tab 8/2/2021 at 0930 Patient Yes Yes   Sig: Take 200 mg by mouth every day.   lisinopril (PRINIVIL) 20 MG Tab 8/2/2021 at 0930 Patient Yes No   Sig: Take 20 mg by mouth every day.   metoprolol SR (TOPROL XL) 25 MG TABLET SR 24 HR 8/1/2021 at 2230 Patient No No   Sig: Take 1 tablet by mouth every day.   pantoprazole (PROTONIX) 40 MG Tablet Delayed Response 8/2/2021 at 0930 Patient Yes No   Sig: Take 40 mg by mouth every day.   rivaroxaban (XARELTO) 20 MG Tab tablet 8/1/2021 at 1800 Patient Yes No   Sig: Take 20 mg by mouth with  dinner.   tamsulosin (FLOMAX) 0.4 MG capsule 8/1/2021 at 2230 Patient Yes No   Sig: Take 0.4 mg by mouth at bedtime.   therapeutic multivitamin-minerals (THERAGRAN-M) Tab 8/2/2021 at 0930 Patient Yes Yes   Sig: Take 1 tablet by mouth every day.   zolpidem (AMBIEN) 10 MG Tab 8/1/2021 at 2230 Patient Yes Yes   Sig: Take 10 mg by mouth at bedtime as needed for Sleep.      Facility-Administered Medications: None       Physical Exam  Temp:  [36.8 °C (98.3 °F)] 36.8 °C (98.3 °F)  Pulse:  [79-96] 79  Resp:  [] 18  BP: (117-201)/() 117/68  SpO2:  [91 %-94 %] 92 %    Physical Exam  Vitals and nursing note reviewed.   Constitutional:       Appearance: Normal appearance.   HENT:      Head: Normocephalic and atraumatic.      Right Ear: Tympanic membrane normal.      Left Ear: Tympanic membrane normal.      Nose: Nose normal.      Mouth/Throat:      Pharynx: Oropharynx is clear.   Eyes:      General:         Right eye: No discharge.         Left eye: No discharge.      Extraocular Movements: Extraocular movements intact.      Pupils: Pupils are equal, round, and reactive to light.   Cardiovascular:      Rate and Rhythm: Normal rate and regular rhythm.      Pulses: Normal pulses.      Heart sounds: Normal heart sounds.   Pulmonary:      Effort: Pulmonary effort is normal. No respiratory distress.      Breath sounds: Normal breath sounds. No stridor. No wheezing or rhonchi.   Abdominal:      General: Bowel sounds are normal. There is no distension.      Palpations: Abdomen is soft. There is no mass.      Tenderness: There is no abdominal tenderness.      Hernia: No hernia is present.   Musculoskeletal:         General: No swelling, tenderness, deformity or signs of injury. Normal range of motion.      Cervical back: Neck supple.   Skin:     General: Skin is warm and dry.      Capillary Refill: Capillary refill takes less than 2 seconds.   Neurological:      General: No focal deficit present.      Mental Status: He is  alert and oriented to person, place, and time.      Cranial Nerves: No cranial nerve deficit.      Sensory: No sensory deficit.      Motor: No weakness.      Coordination: Coordination normal.   Psychiatric:         Mood and Affect: Mood normal.         Behavior: Behavior normal.         Thought Content: Thought content normal.         Laboratory:  Recent Labs     08/02/21 2004   WBC 8.8   RBC 4.28*   HEMOGLOBIN 14.1   HEMATOCRIT 41.9*   MCV 97.9*   MCH 32.9   MCHC 33.7   RDW 44.0   PLATELETCT 201   MPV 10.5     Recent Labs     08/02/21 2004   SODIUM 140   POTASSIUM 3.6   CHLORIDE 103   CO2 23   GLUCOSE 104*   BUN 14   CREATININE 0.70   CALCIUM 9.2     Recent Labs     08/02/21 2004   ALTSGPT 26   ASTSGOT 33   ALKPHOSPHAT 105*   TBILIRUBIN 0.4   GLUCOSE 104*     Recent Labs     08/02/21 2004   APTT 38.3*   INR 1.40*     No results for input(s): NTPROBNP in the last 72 hours.      Recent Labs     08/02/21 2004   TROPONINT 26*       Imaging:  DX-CHEST-PORTABLE (1 VIEW)   Final Result      1.  Minimal lingular atelectasis.   2.  Atherosclerotic plaque.      CT-CTA NECK WITH & W/O-POST PROCESSING   Final Result      1.  There is bilateral atherosclerosis with estimated greater than 70% stenosis of the right ICA origin and less than 50% stenosis of the left ICA origin.   2.  There is no carotid artery occlusion.   3.  Vertebral basilar system is intact.      CT-CTA HEAD WITH & W/O-POST PROCESS   Final Result      1.  There is no large vessel occlusion.      CT-CEREBRAL PERFUSION ANALYSIS   Final Result      1.  Cerebral blood flow less than 30% likely representing completed infarct = 0 mL.      2.  T Max more than 6 seconds likely representing combination of completed infarct and ischemia = 10 mL.      3.  Mismatched volume likely representing ischemic brain/penumbra = 10 mL.      4.  Please note that the cerebral perfusion was performed on the limited brain tissue around the basal ganglia region. Infarct/ischemia  outside the CT perfusion sections can be missed in this study.      CT-HEAD W/O   Final Result      1.  There is no acute intracranial hemorrhage or territorial infarct.   2.  There is an area of left MCA territory encephalomalacia with ex vacuo dilatation of the left lateral ventricle consistent with an old infarct.      EC-ECHOCARDIOGRAM COMPLETE W/O CONT    (Results Pending)   MR-BRAIN-W/O    (Results Pending)       EKG:  I have personally reviewed the images and compared with prior images.    Assessment/Plan:  I anticipate this patient will require at least two midnights for appropriate medical management, necessitating inpatient admission.    * TIA (transient ischemic attack)- (present on admission)  Assessment & Plan  Patient presents with 30 minutes of right-sided facial droop and expressive aphasia.   Neurology consulted - Dr. Newton - recommended   Telemetry monitoring   MRI Brain w/o  Allow permissive htn until CVA r/o  Check TSH/a1c/lipid panel   Echocardiogram transthoracic with bubble  Aspirin 81 mg daily   PT/OT/SLP          Obesity (BMI 30-39.9)- (present on admission)  Assessment & Plan  Counseled on weight loss and dietary modification    Paroxysmal atrial fibrillation (HCC)- (present on admission)  Assessment & Plan  Telemetry monitoring   Continue with Xarelto 20 mg daily  Continue with metoprolol 25 mg XL    Mixed hyperlipidemia- (present on admission)  Assessment & Plan  Continue with statin    Essential hypertension- (present on admission)  Assessment & Plan  Allow permissive hypertension until CVA ruled out      VTE prophylaxis: therapeutic anticoagulation with xarelto

## 2021-08-03 NOTE — ASSESSMENT & PLAN NOTE
Patient presents with 30 minutes of right-sided facial droop and expressive aphasia.   Neurology consulted - Dr. Newton - recommended   Telemetry monitoring   MRI Brain w/o  Allow permissive htn until CVA r/o  Check TSH/a1c/lipid panel   Echocardiogram transthoracic with bubble  Aspirin 81 mg daily   PT/OT/SLP

## 2021-08-03 NOTE — CARE PLAN
The patient is Stable - Low risk of patient condition declining or worsening         Progress made toward(s) clinical / shift goals:  pt sleeping comfortably at this time. Pt made NPO until SLP eval. Will continue hourly rounding and q4 neuro checks.    Patient is not progressing towards the following goals:

## 2021-08-03 NOTE — PROGRESS NOTES
Monitor Summary: SR 65 - 87, FL .20, QRS .10, QT .42, with frequent PVCs trigeminy per strip from monitor room.

## 2021-08-03 NOTE — PROGRESS NOTES
Neurology Progress Note      Referring Physician: Dustin Mariee M.D.    Chief Complaint   Patient presents with   • Possible Stroke     BIBA from home after patient began to have a right sided droop and expressive aphagia. PMH of stroke and TIA, on xarelto. Stroke alert on arrival       HPI: Refer to initial documented Neurology H&P, as detailed in the patient's chart.    Patient is a 76-year-old male with a past medical history significant for atrial fibrillation (on rivaroxaban), left MCA stroke in 2012 (with residual atrophy of left lower extremity), and HTN/HLD who presented with 30 minutes of aphasia on 8/2/2021 which self-resolved afterward.    Interval History 08/03/21  No acute events overnight.  This morning, patient states he feels well.  He has not had any difficulty speaking or moving.  He feels much better than yesterday.  No acute complaints.    Review of systems: In addition to what is detailed in the HPI and/or updated in the interval history, all other systems reviewed and are negative.    Past Medical History:    has a past medical history of Afib (HCC), Hyperlipidemia, and Hypertension.    FHx:  Father: Prostate cancer  Mom: Breast cancer    SHx:   reports that he has quit smoking. He has never used smokeless tobacco. He reports current alcohol use.  Quit smoking 15 years ago; 35-pack-year smoking history  Alcohol: Drinks 2 cocktails per night  Drugs: No use of illicit drugs    Medications:    Current Facility-Administered Medications:   •  rivaroxaban (XARELTO) tablet 20 mg, 20 mg, Oral, PM MEAL, Brent Knott M.D.  •  hydroxychloroquine (Plaquenil) tablet 200 mg, 200 mg, Oral, DAILY, Brent Knott M.D., 200 mg at 08/03/21 1007  •  aspirin EC (ECOTRIN) tablet 81 mg, 81 mg, Oral, DAILY, Brent Knott M.D., 81 mg at 08/03/21 1008  •  metoprolol SR (TOPROL XL) tablet 25 mg, 25 mg, Oral, DAILY, Brent Knott M.D., 25 mg at 08/03/21 1008  •  atorvastatin (LIPITOR) tablet 80 mg, 80 mg, Oral, QHS,  Brent Knott M.D.  •  Allow for permissive hypertension: SBP up to 220 mmHg/DBP to 120 mmHg; If SBP greater than 220 mmHg or DBP greater than 120 mmHg administer PRN antihypertensive medications., , Other, PHARMACY TO DOSE, Brent Knott M.D.    Physical Examination:     Vitals:    08/02/21 2357 08/03/21 0013 08/03/21 0400 08/03/21 0710   BP: 160/73  127/60 135/85   Pulse: 82  74 66   Resp: 18  18 16   Temp: 36.7 °C (98 °F)  37.1 °C (98.8 °F) 36.8 °C (98.3 °F)   TempSrc: Temporal  Temporal Temporal   SpO2: 95%  98% 95%   Weight: 109 kg (239 lb 3.2 oz) 110 kg (243 lb 9.6 oz)     Height:           General: Patient is awake and in no acute distress, laying in bed. Wife, Lacie at bedside  Eyes: examination of optic disks not indicated at this time  CV: RRR    NEUROLOGICAL EXAM:     Mental status: Awake, alert and fully oriented, follows commands  Speech and language: speech is clear and fluent. The patient is able to name and repeat.  Cranial nerve exam: Pupils are equal, round and reactive to light bilaterally. Visual fields are full. Extraocular muscles are intact. Sensation in the face is intact to light touch. Face is symmetric. Hearing to finger rub equal. Palate elevates symmetrically. Shoulder shrug is full. Tongue is midline.  Motor exam: Strength is 5/5 in all extremities both distally and proximally. Tone is normal. No abnormal movements were seen on exam.  Sensory exam: No sensory deficits identified   Deep tendon reflexes:  2+ and symmetric. Toes down-going bilaterally.  Coordination: no ataxia   Gait: deferred     NIH Stroke Scale     1a. Level of Consciousness (Alert, drowsy, etc): 0= Alert    1b. LOC Questions (Month, age): 0= Answers both correctly    1c. LOC Commands (Open/close eyes make fist/let go): 0= Obeys both correctly    2.   Best Gaze (Eyes open - patient follows examiner's finger on face): 0= Normal    3.   Visual Fields (introduce visual stimulus/threat to patient's field quadrants): 0=  No visual loss    4.   Facial Paresis (Show teeth, raise eyebrows and squeeze eyes shut): 0= Normal     5a. Motor Arm - Left (Elevate arm to 90 degrees if patient is sitting, 45 degrees if  supine): 0= No drift    5b. Motor Arm - Right (Elevate arm to 90 degrees if patient is sitting, 45 degrees if supine): 0= No drift    6a. Motor Leg - Left (Elevate leg 30 degrees with patient supine): 0= No drift    6b. Motor Leg - Right  (Elevate leg 30 degrees with patient supine): 0= No drift    7.   Limb Ataxia (Finger-nose, heel down shin): 0= No ataxia    8.   Sensory (Pin prick to face, arm, trunk and leg - compare side to side): 0= Normal    9.  Best Language (Name item, describe a picture and read sentences): 0= No aphasia    10. Dysarthria (Evaluate speech clarity by patient repeating listed words): 0= Normal articulation    11. Extinction and Inattention (Use information from prior testing to identify neglect or  double simultaneous stimuli testing): 0= No neglect    Total NIH Score: 0           Objective Data:    Labs:  Lab Results   Component Value Date/Time    PROTHROMBTM 16.8 (H) 08/02/2021 08:04 PM    INR 1.40 (H) 08/02/2021 08:04 PM      Lab Results   Component Value Date/Time    WBC 8.8 08/02/2021 08:04 PM    RBC 4.28 (L) 08/02/2021 08:04 PM    HEMOGLOBIN 14.1 08/02/2021 08:04 PM    HEMATOCRIT 41.9 (L) 08/02/2021 08:04 PM    MCV 97.9 (H) 08/02/2021 08:04 PM    MCH 32.9 08/02/2021 08:04 PM    MCHC 33.7 08/02/2021 08:04 PM    MPV 10.5 08/02/2021 08:04 PM    NEUTSPOLYS 52.80 08/02/2021 08:04 PM    LYMPHOCYTES 31.10 08/02/2021 08:04 PM    MONOCYTES 12.00 08/02/2021 08:04 PM    EOSINOPHILS 3.20 08/02/2021 08:04 PM    BASOPHILS 0.70 08/02/2021 08:04 PM      Lab Results   Component Value Date/Time    SODIUM 140 08/02/2021 08:04 PM    POTASSIUM 3.6 08/02/2021 08:04 PM    CHLORIDE 103 08/02/2021 08:04 PM    CO2 23 08/02/2021 08:04 PM    GLUCOSE 104 (H) 08/02/2021 08:04 PM    BUN 14 08/02/2021 08:04 PM    CREATININE 0.70  08/02/2021 08:04 PM      Lab Results   Component Value Date/Time    CHOLSTRLTOT 121 08/03/2021 03:20 AM    LDL 48 08/03/2021 03:20 AM    HDL 58 08/03/2021 03:20 AM    TRIGLYCERIDE 74 08/03/2021 03:20 AM       Lab Results   Component Value Date/Time    ALKPHOSPHAT 105 (H) 08/02/2021 08:04 PM    ASTSGOT 33 08/02/2021 08:04 PM    ALTSGPT 26 08/02/2021 08:04 PM    TBILIRUBIN 0.4 08/02/2021 08:04 PM        Imaging/Testing:  I interpreted the patient's neuroimaging 08/03/21 and can identify encephalomalacia in left frontal lobe.    Assessment and Plan:    Carl Blount is a 76 y.o. male with relevant history of atrial fibrillation (on rivaroxaban), left MCA stroke in 2012 (with residual atrophy of left lower extremity), and HTN/HLD  presenting for whom neurology was consulted to address aphasia x 30 min. This is either TIA vs recrudescence of previous CVA in 2012. Lower on the differential is seizure.    Plan:  -Continue xarelto, aspirin, atorvastatin, metoprolol  -Follow up on echo study  -Follow up outpatient in neurology clinic.  -Patient to self-monitor for recurrence of similar symptoms. If recurrence, then can consider EEG for further seizure workup    Neurology will sign off now, thank you for this consult.    This chart was partially generated using voice recognition technology and sound alike word replacement may be present, best efforts were made to make the chart accurate.    Gill Gandhi MD, MPH  [employer] [facility], PGY-2

## 2021-08-03 NOTE — THERAPY
"Occupational Therapy   Initial Evaluation     Patient Name: Carl Blount  Age:  76 y.o., Sex:  male  Medical Record #: [id number]  Today's Date: 8/3/2021     Precautions  Precautions: Fall Risk  Comments: Mild unsteadiness with 2 LOB; self corrected; reports this is baseline    Assessment  Patient is 76 y.o. male with a diagnosis of new onset facial droop and word salad; hx old CVA; symptoms now resolved.  Additional factors influencing patient status / progress: Pt has good social support, spouse can assist 24/7 as needed. Pt does have hx of old CVA with mild occasional word-finding impairment. Pt also reports recent fall due to \"blacking out\" with inconclusive medical work up, where pt sustained concussion and still has facial bruising. Pt is mildly unsteady at baseline, and had 2 mild losses of balance (self-corrected) during OT eval, but he and spouse report that this is baseline level of function. Pt has no need for AE at this time and reports no concerns about return to home. OT recommended that spouse does driving until clarification received about cause of recent \"black out\" and pt and spouse concurred. Also suggested that shower chair may be helpful in case of this condition returning and pt and spouse will pursue this. No further acute or post acute OT needs at this time.     Plan    Recommend Occupational Therapy for Evaluation only    DC Equipment Recommendations: None needed but shower chair may be helpful.   Discharge Recommendations: Anticipate that the patient will have no further occupational therapy needs after discharge from the hospital        08/03/21 0917   Prior Living Situation   Prior Services None;Home-Independent   Housing / Facility 1 Story House   Steps Into Home 3   Steps In Home 0   Bathroom Set up Walk In Shower   Equipment Owned None   Lives with - Patient's Self Care Capacity Spouse   Comments Spouse can assist prn   Prior Level of ADL Function   Self Feeding Independent " "  Grooming / Hygiene Independent   Bathing Independent   Dressing Independent   Toileting Independent   Prior Level of IADL Function   Medication Management Independent   Laundry Independent   Kitchen Mobility Independent   Finances Independent   Home Management Independent   Shopping Independent   Prior Level Of Mobility Independent Without Device in Community   Driving / Transportation Driving Independent   Occupation (Pre-Hospital Vocational) Retired Due To Age   History of Falls   History of Falls Yes  (in last few weeks; still has bruise on head)   Date of Last Fall   (unable to state; hit head/concussion 2/2 \"blacked out\")   Precautions   Precautions Fall Risk   Comments Mild unsteadiness with 2 LOB; self corrected; reports this is baseline   Balance Assessment   Sitting Balance (Static) Fair +   Sitting Balance (Dynamic) Fair +   Standing Balance (Static) Fair   Standing Balance (Dynamic) Fair   Weight Shift Sitting Good   Weight Shift Standing Good   Comments No AD used/needed   Bed Mobility    Supine to Sit Independent   Sit to Supine Independent   Scooting Independent   Rolling Independent   ADL Assessment   Eating Independent   Grooming Independent   Bathing Independent   Upper Body Dressing Independent   Lower Body Dressing Independent   Toileting Independent   Functional Mobility   Sit to Stand Supervised   Bed, Chair, Wheelchair Transfer Supervised   Toilet Transfers Supervised   Transfer Method Stand Step   Comments 2 mild LOB; self corrected. Pt reports this is baseline.    Activity Tolerance   Sitting Edge of Bed 10 min   Standing 10 min   Anticipated Discharge Equipment and Recommendations   DC Equipment Recommendations None   Discharge Recommendations Anticipate that the patient will have no further occupational therapy needs after discharge from the hospital             "

## 2021-08-03 NOTE — CONSULTS
Hospital Neurology Stroke Consult:    Referring Physician: Juan Luis Barajas M.D.    Reason for consultation: Stroke like symptoms    Last Known Well: 6:45pm    TPA Decision: No TPA due to Xarelto    HPI: Carl Blount is a 76 y.o. male with history of Afib on Xarelto, HTN, HLD, previous LMCA distribution stroke in 2012 presenting to the hospital for speech difficulty and consulted for stroke. Patient was at home when he experienced a 30 minute episode of aphasia. He knew what he wanted to say but could not get the words out. He states his previous stroke he had R arm weakness, facial droop, and aphasia but today was only aphasia. Symptoms clearing by the time he came to E.D. At the time of my evaluation aphasia was very mild. NIHSS of 1. No TPA given due to Xarelto and resolving deficits.    ROS:     As above. All other systems reviewed and are negative.    Past Medical History:    has a past medical history of Afib (HCC), Hyperlipidemia, and Hypertension.    FHx:  No Fhx of stroke    SHx:   reports that he has quit smoking. He has never used smokeless tobacco. He reports current alcohol use.    Allergies:  No Known Allergies    Medications:  No current facility-administered medications for this encounter.    Current Outpatient Medications:   •  atorvastatin (LIPITOR) 80 MG tablet, Take 1 tablet by mouth at bedtime., Disp: 90 tablet, Rfl: 3  •  aspirin (ASA) 81 MG Chew Tab chewable tablet, Chew 1 tablet every day., Disp: 100 tablet, Rfl:   •  metoprolol SR (TOPROL XL) 25 MG TABLET SR 24 HR, Take 1 tablet by mouth every day., Disp: 90 tablet, Rfl: 3  •  fluorouracil (EFUDEX) 5 % cream, Twice daily to area on the SCALP for 2-4 weeks until red and scabbed. AVOID eyes., Disp: 1 Tube, Rfl: 1  •  cetirizine (ZYRTEC) 10 MG Tab, Take 10 mg by mouth every day., Disp: , Rfl:   •  cyclobenzaprine (FLEXERIL) 10 MG Tab, Take 10 mg by mouth 3 times a day as needed., Disp: , Rfl:   •  hydroCHLOROthiazide (HYDRODIURIL) 25  "MG Tab, Take 25 mg by mouth every day., Disp: , Rfl:   •  lidocaine (LIDODERM) 5 % Patch, Apply 1 Patch to skin as directed every 24 hours., Disp: , Rfl:   •  lisinopril (PRINIVIL) 20 MG Tab, Take 20 mg by mouth every day., Disp: , Rfl:   •  LORazepam (ATIVAN) 1 MG Tab, Take 0.5 mg by mouth every four hours as needed for Anxiety., Disp: , Rfl:   •  pantoprazole (PROTONIX) 40 MG Tablet Delayed Response, Take 40 mg by mouth every day., Disp: , Rfl:   •  rivaroxaban (XARELTO) 20 MG Tab tablet, Take 20 mg by mouth with dinner., Disp: , Rfl:   •  tamsulosin (FLOMAX) 0.4 MG capsule, Take 0.4 mg by mouth ONE-HALF HOUR AFTER BREAKFAST., Disp: , Rfl:   •  zolpidem (AMBIEN) 5 MG Tab, Take 10 mg by mouth at bedtime as needed for Sleep., Disp: , Rfl:     Vitals:   Vitals:    08/02/21 2000 08/02/21 2026   BP: (!) 166/76    Resp: (!) 118    Weight:  104 kg (230 lb)   Height:  1.854 m (6' 1\")       Labs:  No results found for: PROTHROMBTM, INR   Lab Results   Component Value Date/Time    WBC 11.5 (H) 05/26/2021 11:25 AM    RBC 4.50 (L) 05/26/2021 11:25 AM    HEMOGLOBIN 14.6 05/26/2021 11:25 AM    HEMATOCRIT 44.2 05/26/2021 11:25 AM    MCV 98.2 (H) 05/26/2021 11:25 AM    MCH 32.4 05/26/2021 11:25 AM    MCHC 33.0 (L) 05/26/2021 11:25 AM    MPV 10.3 05/26/2021 11:25 AM    NEUTSPOLYS 71.90 05/26/2021 11:25 AM    LYMPHOCYTES 15.80 (L) 05/26/2021 11:25 AM    MONOCYTES 9.50 05/26/2021 11:25 AM    EOSINOPHILS 1.60 05/26/2021 11:25 AM    BASOPHILS 0.40 05/26/2021 11:25 AM      Lab Results   Component Value Date/Time    SODIUM 141 05/26/2021 11:25 AM    POTASSIUM 3.8 05/26/2021 11:25 AM    CHLORIDE 106 05/26/2021 11:25 AM    CO2 24 05/26/2021 11:25 AM    GLUCOSE 116 (H) 05/26/2021 11:25 AM    BUN 15 05/26/2021 11:25 AM    CREATININE 0.69 05/26/2021 11:25 AM      Lab Results   Component Value Date/Time    CHOLSTRLTOT 103 02/02/2021 02:11 PM    LDL 37 02/02/2021 02:11 PM    HDL 53 02/02/2021 02:11 PM    TRIGLYCERIDE 66 02/02/2021 02:11 PM     "   Lab Results   Component Value Date/Time    ALKPHOSPHAT 91 05/26/2021 11:25 AM    ASTSGOT 43 05/26/2021 11:25 AM    ALTSGPT 45 05/26/2021 11:25 AM    TBILIRUBIN 0.6 05/26/2021 11:25 AM        Imaging:  CT Head W/O CST personally reviewed w/o evidence of hemorrhage or acute infarction.     CTA Head/Neck reviewed in chart.     CTP reviewed in chart.     Physical Exam:     General: 77 y/o male in bed in NAD  Cardio: Normal S1/S2. No peripheral edema.   Pulm: CTAX2. No respiratory distress.   Skin: Warm, dry, no rashes or lesions   Psychiatric: Appropriate affect. No active psychosis.  HEENT: Atraumatic head, normal sclera and conjunctiva, moist oral mucosa. No lid lag.  Abdomen: Soft, non tender. No masses or hepatosplenomegaly.    Physical Exam:    NIH Stroke Scale:    1a. Level of Consciousness (Alert, drowsy, etc): 0= Alert    1b. LOC Questions (Month, age): 0= Answers both correctly    1c. LOC Commands (Open/close eyes make fist/let go): 0= Obeys both correctly    2.   Best Gaze (Eyes open - patient follows examiner's finger on face): 0= Normal    3.   Visual Fields (introduce visual stimulus/threat to patient's field quadrants): 0= No visual loss  4.   Facial Paresis (Show teeth, raise eyebrows and squeeze eyes shut): 0= Normal     5a. Motor Arm - Left (Elevate arm to 90 degrees if patient is sitting, 45 degrees if  supine): 0= No drift    5b. Motor Arm - Right (Elevate arm to 90 degrees if patient is sitting, 45 degrees if supine): 0= No drift    6a. Motor Leg - Left (Elevate leg 30 degrees with patient supine): 0= No drift    6b. Motor Leg - Right  (Elevate leg 30 degrees with patient supine): 0= No drift    7.   Limb Ataxia (Finger-nose, heel down shin): 0= No ataxia    8.   Sensory (Pin prick to face, arm, trunk and leg - compare side to side): 0= Normal    9.  Best Language (Name item, describe a picture and read sentences): 1= Mild to moderate aphasia    10. Dysarthria (Evaluate speech clarity by patient  repeating listed words): 0= Normal articulation    11. Extinction and Inattention (Use information from prior testing to identify neglect or  double simultaneous stimuli testing): 0= No neglect    Total NIH Score: 1    Assessment/Plan:    Carl Blount is a 76 y.o. male with history of Afib on Xarelto, HTN, HLD, previous LMCA distribution stroke in 2012 presenting to the hospital for speech difficulty and consulted for stroke. Symptoms appear referable to previous stroke distribution, although today's episode presented primarily w/ aphasia. Today's episode is either TIA or recrudescence of stroke symptoms. He is already on anticoagulation however. Will need an MRI to see if evidence of acute infarction. If it appears embolic could consider switching from Xarelto to Eliquis, although this is not evidence based.     Plan:   -Admit to hospital for further workup.  -Neuro checks/vital signs per protocol.  -Permissive HTN  -MRI Brain W/O CST  -Obtain CBC/CMP/TSH/LDL/Hgb A1C  -Obtain echocardiogram w/ bubble study  -Initiate smoking cessation/stroke education.  -Schedule evaluation with PT/OT/ST  -ASA 325mg PO now once.  -Plan discussed with consulting physician and patient's nurse      Sal Newton M.D., Diplomat of the American Board of Psychiatry and Neurology  Diplomat of [employer]BP[employer] Epilepsy Subspecialty   Assistant Clinical Professor, [facility] Neurology Consultant

## 2021-08-03 NOTE — DISCHARGE INSTRUCTIONS
Discharge Instructions per Dustin Mariee M.D.  Continue home medication as previously instructed.  No change in current regimen.  Follow-up with primary care within 2 weeks  Follow[facility] with [facility]    DIET: Healthy    ACTIVITY: As tolerated    DIAGNOSIS: TIA    Return to ER if severe headache, focal weakness, speech changes, fever, confusion, unusual bleeding, severe abdominal pain, vision changes    -Patient to self-monitor for recurrence of similar symptoms. If recurrence, then can consider EEG for further seizure workup    Discharge Instructions    Discharged to home by car with relative. Discharged via wheelchair, hospital escort: Yes.  Special equipment needed: Not Applicable    Be sure to schedule a follow-up appointment with your primary care doctor or any specialists as instructed.     Discharge Plan:   Diet Plan: Discussed  Activity Level: Discussed  Confirmed Follow up Appointment: Appointment Scheduled  Confirmed Symptoms Management: Discussed  Medication Reconciliation Updated: Yes    I understand that a diet low in cholesterol, fat, and sodium is recommended for good health. Unless I have been given specific instructions below for another diet, I accept this instruction as my diet prescription.   Other diet: cardiac      Special Instructions:     Stroke/CVA/TIA/Hemorrhagic Ischemia Discharge Instructions  You have had a stroke. Your risk factors have been identified as follows:  Age - Over 55  Gender - Men are at a higher risk than women  Atrial Fibrillation  It is important that you reduce your risk factors to avoid another stroke in the future. Here are some general guidelines to follow:  · Eat healthy - avoid food high in fat.  · Get regular exercise.  · Maintain a healthy weight.  · Avoid smoking.  · Avoid alcohol and illegal drug use.  · Take your medications as directed.  For more information regarding risk factors, refer to pages 17-19 in your Stroke Patient Education Guide. Stroke  Education Guide was given to patient.    Warning signs of a stroke include (which can also be found on page 3 of your Stroke Patient Education Guide):  · Sudden numbness of weakness of the face, arm or leg (especially on one side of the body).  · Sudden confusion, trouble speaking or understanding.  · Sudden trouble seeing in one or both eyes.  · Sudden trouble walking, dizziness, loss of balance or coordination.  · Sudden severe headache with no known cause.  It is very important to get treatment quickly when a stroke occurs. If you experience any of the above warning signs, call 241 immediately.     Some patients who have had a stroke will be going home on a blood thinner medication called Warfarin (Coumadin).  This medication requires very close monitoring and follow up.  This follow up can be provided by either your Primary Care Physician or by [facility]'s Outpatient Anticoagulation Service.  The Outpatient Anticoagulation Service is located at the [facility] at [facility] ([facility]).  If you do not know when your follow up appointment is scheduled, call [phone] to verify your appointment time.      · Is patient discharged on Warfarin / Coumadin?   No     Depression / Suicide Risk    As you are discharged from this [facility], it is important to learn how to keep safe from harming yourself.    Recognize the warning signs:  · Abrupt changes in personality, positive or negative- including increase in energy   · Giving away possessions  · Change in eating patterns- significant weight changes-  positive or negative  · Change in sleeping patterns- unable to sleep or sleeping all the time   · Unwillingness or inability to communicate  · Depression  · Unusual sadness, discouragement and loneliness  · Talk of wanting to die  · Neglect of personal appearance   · Rebelliousness- reckless behavior  · Withdrawal from people/activities they  love  · Confusion- inability to concentrate     If you or a loved one observes any of these behaviors or has concerns about self-harm, here's what you can do:  · Talk about it- your feelings and reasons for harming yourself  · Remove any means that you might use to hurt yourself (examples: pills, rope, extension cords, firearm)  · Get professional help from the community (Mental Health, Substance Abuse, psychological counseling)  · Do not be alone:Call your Safe Contact- someone whom you trust who will be there for you.  · Call your local CRISIS HOTLINE 473-1716 or 154-735-3274  · Call your local Children's Mobile Crisis Response Team [place] (627) 022-2458 or www.[employer]  · Call the toll free National Suicide Prevention Hotlines   · National Suicide Prevention Lifeline 928-598-WZRI (5151)  · tagWALLET Line Network 800-SUICIDE (361-8506)      Heart-Healthy Eating Plan  Heart-healthy meal planning includes:  · Eating less unhealthy fats.  · Eating more healthy fats.  · Making other changes in your diet.  Talk with your doctor or a diet specialist (dietitian) to create an eating plan that is right for you.  What is my plan?  Your doctor may recommend an eating plan that includes:  · Total fat: ______% or less of total calories a day.  · Saturated fat: ______% or less of total calories a day.  · Cholesterol: less than _________mg a day.  What are tips for following this plan?  Cooking  Avoid frying your food. Try to bake, boil, grill, or broil it instead. You can also reduce fat by:  · Removing the skin from poultry.  · Removing all visible fats from meats.  · Steaming vegetables in water or broth.  Meal planning    · At meals, divide your plate into four equal parts:  ? Fill one-half of your plate with vegetables and green salads.  ? Fill one-fourth of your plate with whole grains.  ? Fill one-fourth of your plate with lean protein foods.  · Eat 4-5 servings of vegetables per day. A serving of  vegetables is:  ? 1 cup of raw or cooked vegetables.  ? 2 cups of raw leafy greens.  · Eat 4-5 servings of fruit per day. A serving of fruit is:  ? 1 medium whole fruit.  ? ¼ cup of dried fruit.  ? ½ cup of fresh, frozen, or canned fruit.  ? ½ cup of 100% fruit juice.  · Eat more foods that have soluble fiber. These are apples, broccoli, carrots, beans, peas, and barley. Try to get 20-30 g of fiber per day.  · Eat 4-5 servings of nuts, legumes, and seeds per week:  ? 1 serving of dried beans or legumes equals ½ cup after being cooked.  ? 1 serving of nuts is ¼ cup.  ? 1 serving of seeds equals 1 tablespoon.  General information  · Eat more home-cooked food. Eat less restaurant, buffet, and fast food.  · Limit or avoid alcohol.  · Limit foods that are high in starch and sugar.  · Avoid fried foods.  · Lose weight if you are overweight.  · Keep track of how much salt (sodium) you eat. This is important if you have high blood pressure. Ask your doctor to tell you more about this.  · Try to add vegetarian meals each week.  Fats  · Choose healthy fats. These include olive oil and canola oil, flaxseeds, walnuts, almonds, and seeds.  · Eat more omega-3 fats. These include salmon, mackerel, sardines, tuna, flaxseed oil, and ground flaxseeds. Try to eat fish at least 2 times each week.  · Check food labels. Avoid foods with trans fats or high amounts of saturated fat.  · Limit saturated fats.  ? These are often found in animal products, such as meats, butter, and cream.  ? These are also found in plant foods, such as palm oil, palm kernel oil, and coconut oil.  · Avoid foods with partially hydrogenated oils in them. These have trans fats. Examples are stick margarine, some tub margarines, cookies, crackers, and other baked goods.  What foods can I eat?  Fruits  All fresh, canned (in natural juice), or frozen fruits.  Vegetables  Fresh or frozen vegetables (raw, steamed, roasted, or grilled). Green salads.  Grains  Most  grains. Choose whole wheat and whole grains most of the time. Rice and pasta, including brown rice and pastas made with whole wheat.  Meats and other proteins  Lean, well-trimmed beef, veal, pork, and lamb. Chicken and turkey without skin. All fish and shellfish. Wild duck, rabbit, pheasant, and venison. Egg whites or low-cholesterol egg substitutes. Dried beans, peas, lentils, and tofu. Seeds and most nuts.  Dairy  Low-fat or nonfat cheeses, including ricotta and mozzarella. Skim or 1% milk that is liquid, powdered, or evaporated. Buttermilk that is made with low-fat milk. Nonfat or low-fat yogurt.  Fats and oils  Non-hydrogenated (trans-free) margarines. Vegetable oils, including soybean, sesame, sunflower, olive, peanut, safflower, corn, canola, and cottonseed. Salad dressings or mayonnaise made with a vegetable oil.  Beverages  Mineral water. Coffee and tea. Diet carbonated beverages.  Sweets and desserts  Sherbet, gelatin, and fruit ice. Small amounts of dark chocolate.  Limit all sweets and desserts.  Seasonings and condiments  All seasonings and condiments.  The items listed above may not be a complete list of foods and drinks you can eat. Contact a dietitian for more options.  What foods should I avoid?  Fruits  Canned fruit in heavy syrup. Fruit in cream or butter sauce. Fried fruit. Limit coconut.  Vegetables  Vegetables cooked in cheese, cream, or butter sauce. Fried vegetables.  Grains  Breads that are made with saturated or trans fats, oils, or whole milk. Croissants. Sweet rolls. Donuts. High-fat crackers, such as cheese crackers.  Meats and other proteins  Fatty meats, such as hot dogs, ribs, sausage, sun, rib-eye roast or steak. High-fat deli meats, such as salami and bologna. Caviar. Domestic duck and goose. Organ meats, such as liver.  Dairy  Cream, sour cream, cream cheese, and creamed cottage cheese. Whole-milk cheeses. Whole or 2% milk that is liquid, evaporated, or condensed. Whole  buttermilk. Cream sauce or high-fat cheese sauce. Yogurt that is made from whole milk.  Fats and oils  Meat fat, or shortening. Cocoa butter, hydrogenated oils, palm oil, coconut oil, palm kernel oil. Solid fats and shortenings, including sun fat, salt pork, lard, and butter. Nondairy cream substitutes. Salad dressings with cheese or sour cream.  Beverages  Regular sodas and juice drinks with added sugar.  Sweets and desserts  Frosting. Pudding. Cookies. Cakes. Pies. Milk chocolate or white chocolate. Buttered syrups. Full-fat ice cream or ice cream drinks.  The items listed above may not be a complete list of foods and drinks to avoid. Contact a dietitian for more information.  Summary  · Heart-healthy meal planning includes eating less unhealthy fats, eating more healthy fats, and making other changes in your diet.  · Eat a balanced diet. This includes fruits and vegetables, low-fat or nonfat dairy, lean protein, nuts and legumes, whole grains, and heart-healthy oils and fats.  This information is not intended to replace advice given to you by your health care provider. Make sure you discuss any questions you have with your health care provider.  Document Released: 06/18/2013 Document Revised: 02/21/2019 Document Reviewed: 01/25/2019  [employer] Patient Education © 2020 [employer]

## 2021-08-03 NOTE — ED PROVIDER NOTES
ED Provider Note    CHIEF COMPLAINT  Chief Complaint   Patient presents with   • Possible Stroke     BIBA from home after patient began to have a right sided droop and expressive aphagia. PMH of stroke and TIA, on xarelto. Stroke alert on arrival       HPI  Carl Blount is a 76 y.o. male with a past medical history of CVA on Xarelto who presents for evaluation of right facial droop and difficulty speaking which started around 5:30 PM-5:45 PM today.  Patient notes no headache, recent fevers or chills, nausea, vomiting, or diarrhea.  Patient notes he has no numbness or tingling to extremities and no problems with coordination.    REVIEW OF SYSTEMS  Constitutional: No fevers or chills  Skin: No rashes  HEENT: No sore throat, runny nose, sores, trouble swallowing  Neck: No neck pain, stiffness, or masses.  Chest: No pain or rashes  Pulm: No shortness of breath, cough, wheezing, stridor, or pain with inspiration/expiration  Gastrointestinal: No nausea, vomiting, diarrhea, abdominal pain.  Genitourinary: No dysuria or hematuria  Musculoskeletal: No recent trauma, pain, swelling, weakness  Neurologic: Difficulty speaking, right facial droop.  Heme: Taking Xarelto for history of A. fib and stroke.   Immuno: No hx of recurrent infections      PAST MEDICAL HISTORY   has a past medical history of Afib (HCC), Hyperlipidemia, and Hypertension.    SOCIAL HISTORY  Social History     Tobacco Use   • Smoking status: Former Smoker   • Smokeless tobacco: Never Used   Vaping Use   • Vaping Use: Never used   Substance and Sexual Activity   • Alcohol use: Yes     Comment: 2 drinks/night   • Drug use: Not on file   • Sexual activity: Not on file       SURGICAL HISTORY  patient denies any surgical history    CURRENT MEDICATIONS  Home Medications     Reviewed by Farida Zavaleta R.N. (Registered Nurse) on 08/03/21 at 0012  Med List Status: Complete   Medication Last Dose Status   aspirin (ASA) 81 MG Chew Tab chewable tablet 8/1/2021  "Active   atorvastatin (LIPITOR) 80 MG tablet 8/1/2021 Active   cetirizine (ZYRTEC) 10 MG Tab 8/2/2021 Active   cyanocobalamin (VITAMIN B12) 1000 MCG Tab 8/2/2021 Active   cyclobenzaprine (FLEXERIL) 10 MG Tab 8/1/2021 Active   hydroCHLOROthiazide (HYDRODIURIL) 25 MG Tab 8/2/2021 Active   hydroxychloroquine (PLAQUENIL) 200 MG Tab 8/2/2021 Active   lisinopril (PRINIVIL) 20 MG Tab 8/2/2021 Active   metoprolol SR (TOPROL XL) 25 MG TABLET SR 24 HR 8/1/2021 Active   Omega-3 Fatty Acids (FISH OIL) 1000 MG Cap capsule 8/2/2021 Active   pantoprazole (PROTONIX) 40 MG Tablet Delayed Response 8/2/2021 Active   rivaroxaban (XARELTO) 20 MG Tab tablet 8/1/2021 Active   tamsulosin (FLOMAX) 0.4 MG capsule 8/1/2021 Active   therapeutic multivitamin-minerals (THERAGRAN-M) Tab 8/2/2021 Active   zolpidem (AMBIEN) 10 MG Tab 8/1/2021 Active                ALLERGIES  No Known Allergies    PHYSICAL EXAM  VITAL SIGNS: /73 Comment: RN notified  Pulse 82   Temp 36.7 °C (98 °F) (Temporal)   Resp 18   Ht 1.854 m (6' 1\")   Wt 110 kg (243 lb 9.6 oz)   SpO2 95%   BMI 32.14 kg/m²    Gen: Alert in no apparent distress.  Pleasant  HEENT: No signs of trauma, Bilateral external ears normal, Nose normal. Conjunctiva normal, Non-icteric.  PERRLA, EOMI   Cardiovascular: Irregularly irregular rhythm, normal rate.  Capillary refill less than 3 seconds to all extremities, 2+ distal pulses.  Thorax & Lungs: Normal breath sounds, No respiratory distress, No wheezing bilateral chest rise  Abdomen: Bowel sounds normal, Soft, No tenderness, No masses, No pulsatile masses. No Guarding or rebound  Skin: Warm, Dry, No erythema, No rash noted to exposed areas.   Extremities: Intact distal pulses, No edema  Neurologic:   CN 2: Visual acuity grossly intact, visual fields grossly intact  CN 2-3: Pupils equal round reactive  CN 3, 4, 6: Extraocular eye movements intact, no lid lag  CN 5: Facial sensation intact to light touch bilaterally  CN 7: Slight " asymmetry to right mid face with right mid face muscle weakness  CN 8: Hearing intact to finger rub bilaterally  CN 9,10: Swallowing normally, soft palate elevates normally  CN 4, 7, 10, 12: Voice normal, slight dysarthria and/or expressive aphasia  CN 11: Strong shoulder shrug, good strength with head rotation  CN 12: No deviation of the tongue    Motor:   RUE: 5 out of 5 strength proximally and distally, no pronator or arm drift  LUE:5 out of 5 strength proximally and distally, no pronator or arm drift  RLE:5 out of 5 strength proximally and distally, no leg drift  LLE:5 out of 5 strength proximally and distally, no leg drift    Sensory:  RUE: Sensation intact to light touch, equal bilat  RLE:  Sensation intact to light touch, equal bilat  LUE: Sensation intact to light touch, equal bilat  LLE: Sensation intact to light touch, equal bilat    Psychiatric: Affect pleasant    NIH 2 @2000      LABS  Results for orders placed or performed during the hospital encounter of 08/02/21   CBC WITH DIFFERENTIAL   Result Value Ref Range    WBC 8.8 4.8 - 10.8 K/uL    RBC 4.28 (L) 4.70 - 6.10 M/uL    Hemoglobin 14.1 14.0 - 18.0 g/dL    Hematocrit 41.9 (L) 42.0 - 52.0 %    MCV 97.9 (H) 81.4 - 97.8 fL    MCH 32.9 27.0 - 33.0 pg    MCHC 33.7 33.7 - 35.3 g/dL    RDW 44.0 35.9 - 50.0 fL    Platelet Count 201 164 - 446 K/uL    MPV 10.5 9.0 - 12.9 fL    Neutrophils-Polys 52.80 44.00 - 72.00 %    Lymphocytes 31.10 22.00 - 41.00 %    Monocytes 12.00 0.00 - 13.40 %    Eosinophils 3.20 0.00 - 6.90 %    Basophils 0.70 0.00 - 1.80 %    Immature Granulocytes 0.20 0.00 - 0.90 %    Nucleated RBC 0.00 /100 WBC    Neutrophils (Absolute) 4.63 1.82 - 7.42 K/uL    Lymphs (Absolute) 2.73 1.00 - 4.80 K/uL    Monos (Absolute) 1.05 (H) 0.00 - 0.85 K/uL    Eos (Absolute) 0.28 0.00 - 0.51 K/uL    Baso (Absolute) 0.06 0.00 - 0.12 K/uL    Immature Granulocytes (abs) 0.02 0.00 - 0.11 K/uL    NRBC (Absolute) 0.00 K/uL   COMP METABOLIC PANEL   Result Value Ref  Range    Sodium 140 135 - 145 mmol/L    Potassium 3.6 3.6 - 5.5 mmol/L    Chloride 103 96 - 112 mmol/L    Co2 23 20 - 33 mmol/L    Anion Gap 14.0 7.0 - 16.0    Glucose 104 (H) 65 - 99 mg/dL    Bun 14 8 - 22 mg/dL    Creatinine 0.70 0.50 - 1.40 mg/dL    Calcium 9.2 8.5 - 10.5 mg/dL    AST(SGOT) 33 12 - 45 U/L    ALT(SGPT) 26 2 - 50 U/L    Alkaline Phosphatase 105 (H) 30 - 99 U/L    Total Bilirubin 0.4 0.1 - 1.5 mg/dL    Albumin 3.9 3.2 - 4.9 g/dL    Total Protein 6.8 6.0 - 8.2 g/dL    Globulin 2.9 1.9 - 3.5 g/dL    A-G Ratio 1.3 g/dL   PROTHROMBIN TIME   Result Value Ref Range    PT 16.8 (H) 12.0 - 14.6 sec    INR 1.40 (H) 0.87 - 1.13   APTT   Result Value Ref Range    APTT 38.3 (H) 24.7 - 36.0 sec   COD (ADULT)   Result Value Ref Range    ABO Grouping Only A     Rh Grouping Only POS     Antibody Screen-Cod NEG    TROPONIN   Result Value Ref Range    Troponin T 26 (H) 6 - 19 ng/L   ABO Rh Confirm   Result Value Ref Range    ABO Rh Confirm A POS    ESTIMATED GFR   Result Value Ref Range    GFR If African American >60 >60 mL/min/1.73 m 2    GFR If Non African American >60 >60 mL/min/1.73 m 2   Hemoglobin A1C   Result Value Ref Range    Glycohemoglobin 5.6 4.0 - 5.6 %    Est Avg Glucose 114 mg/dL   TSH   Result Value Ref Range    TSH 2.010 0.380 - 5.330 uIU/mL   EKG (NOW)   Result Value Ref Range    Report       [facility]    Test Date:  2021  Pt Name:    SHANDRA JOYNER               Department: ER  MRN:        [id number]                      Room:       RD 12  Gender:     Male                         Technician: [id number]  :        1945                   Requested By:CLIFFORD YODER  Order #:    [id number]                    Reading MD:    Measurements  Intervals                                Axis  Rate:       90                           P:          33  ID:         [id number]                          QRS:        -61  QRSD:       130                          T:          101  QT:          420  QTc:        514    Interpretive Statements  SINUS RHYTHM  PAIRED VENTRICULAR PREMATURE COMPLEXES  NONSPECIFIC IVCD WITH LAD  Compared to ECG 05/26/2021 11:50:20  Ventricular premature complex(es) now present  Intraventricular conduction delay now present  Left bundle-branch block no longer present         RADIOLOGY  DX-CHEST-PORTABLE (1 VIEW)   Final Result      1.  Minimal lingular atelectasis.   2.  Atherosclerotic plaque.      CT-CTA NECK WITH & W/O-POST PROCESSING   Final Result      1.  There is bilateral atherosclerosis with estimated greater than 70% stenosis of the right ICA origin and less than 50% stenosis of the left ICA origin.   2.  There is no carotid artery occlusion.   3.  Vertebral basilar system is intact.      CT-CTA HEAD WITH & W/O-POST PROCESS   Final Result      1.  There is no large vessel occlusion.      CT-CEREBRAL PERFUSION ANALYSIS   Final Result      1.  Cerebral blood flow less than 30% likely representing completed infarct = 0 mL.      2.  T Max more than 6 seconds likely representing combination of completed infarct and ischemia = 10 mL.      3.  Mismatched volume likely representing ischemic brain/penumbra = 10 mL.      4.  Please note that the cerebral perfusion was performed on the limited brain tissue around the basal ganglia region. Infarct/ischemia outside the CT perfusion sections can be missed in this study.      CT-HEAD W/O   Final Result      1.  There is no acute intracranial hemorrhage or territorial infarct.   2.  There is an area of left MCA territory encephalomalacia with ex vacuo dilatation of the left lateral ventricle consistent with an old infarct.      EC-ECHOCARDIOGRAM COMPLETE W/O CONT    (Results Pending)   MR-BRAIN-W/O    (Results Pending)     Critical Care Note  Upon my evaluation, this patient had high probability of imminent and life-threatening deterioration due to acute ischemic CVA / TIA , which required my direct attention, intervention, and  personal management. I personally provided 35 minutes of critical care time exclusive of time spent on separately billable procedures. Time includes review of laboratory data, radiology results, discussion with consultants, and monitoring for potential decompensation.   HYDRATION: Based on the patient's presentation of Other acute CVA the patient was given IV fluids. IV Hydration was used because oral hydration was not adequate alone. Upon recheck following hydration, the patient was stable.    COURSE & MEDICAL DECISION MAKING  Patient has for evaluation of right mid face muscular weakness and expressive aphasia which seems to be resolving and is likely related to a TIA/CVA.  Patient is on Xarelto and has an NIH of 2 with resolving symptoms therefore I feel TPA is not indicated for these above reasons.  Patient was discussed with the neurologist who will evaluate the patient in the emergency department.    Patient does appear to have a small volume of mismatch which is suggestive of recrudescence of the previous stroke.  Reevaluation of patient once he returned from CT imaging demonstrated an NIH score 1, see Dr. Cabrera's note, which represents slight interval improvement since his arrival as there is no longer facial droop appreciated.  Patient will be discussed with the hospitalist for admission.    FINAL IMPRESSION  1. TIA (transient ischemic attack)        Electronically signed by: Juan Luis Barajas M.D., 8/2/2021 8:16 PM

## 2021-08-03 NOTE — PROGRESS NOTES
4 Eyes Skin Assessment Completed by JERMAINE Iqbal and JERMAINE Hernandez.    Head WDL  Ears WDL  Nose WDL  Mouth WDL  Neck Scab  Breast/Chest WDL  Shoulder Blades WDL  Spine WDL  (R) Arm/Elbow/Hand Bruising, Abrasion and Scab  (L) Arm/Elbow/Hand Bruising  Abdomen WDL  Groin WDL  Scrotum/Coccyx/Buttocks WDL  (R) Leg WDL  (L) Leg Bruising and Abrasion  (R) Heel/Foot/Toe WDL  (L) Heel/Foot/Toe Scab          Devices In Places Tele Box      Interventions In Place Pressure Redistribution Mattress    Possible Skin Injury Yes    Pictures Uploaded Into Epic No, needs to be completed  Wound Consult Placed Yes  RN Wound Prevention Protocol Ordered Yes

## 2021-08-03 NOTE — THERAPY
"Physical Therapy   Initial Evaluation     Patient Name: Carl Blount  Age:  76 y.o., Sex:  male  Medical Record #: [id number]  Today's Date: 8/3/2021     Precautions: Fall Risk, Swallow Precautions per SLP    Assessment  Patient is a 76 y.o. male admitted with stroke like symptoms which have since resolved and per pt he feels he has returned to his baseline. Spoke with OT prior to eval who stated pt had 2 LOB which he self corrected, however this did not occur during the PT evaluation. Challenged pt with high marching to simulate a step and allowed him to use the hand rail as per home. At this point he is functioning at his baseline level of mobility and does not require ongoing PT in the acute setting therefore, the patient will not be actively followed for physical therapy services at this time, however may be seen if requested by physician for 1 more visit within 30 days to address any discharge or equipment needs     Plan    Eval only    DC Equipment Recommendations: None  Discharge Recommendations: Anticipate that the patient will have no further physical therapy needs after discharge from the hospital       Subjective    Pleasant and agreeable to PT; understands the need 2/2 to presenting with stroke like symptoms     Objective       08/03/21 1110   Prior Living Situation   Prior Services None   Housing / Facility 1 Story House   Steps Into Home 2   Steps In Home 0   Rail Right Rail  (Steps in Home)   Equipment Owned   (walking stick)   Lives with - Patient's Self Care Capacity Spouse   Comments reports he has 2 steps to enter garage with a handrail and \"they aren't an issue\"   Prior Level of Functional Mobility   Bed Mobility Independent   Transfer Status Independent   Ambulation Independent   Distance Ambulation (Feet)   (community)   Assistive Devices Used   (walking stick)   Stairs Independent   Comments goes to [employer] 3x/wk to use the treadmill   History of Falls   History of Falls Yes "   Date of Last Fall   (unclear on date)   Cognition    Cognition / Consciousness WDL   Comments states he feels he's at his basline   Active ROM Lower Body    Active ROM Lower Body  WDL   Strength Lower Body   Lower Body Strength  WDL   Sensation Lower Body   Lower Extremity Sensation   WDL   Strength Upper Body   Upper Body Strength  WDL   Coordination Upper Body   Coordination WDL   Comments finger <> nose   Coordination Lower Body    Coordination Lower Body  WDL   Comments heel <> shin   Balance Assessment   Sitting Balance (Static) Good   Sitting Balance (Dynamic) Good   Standing Balance (Static) Fair   Standing Balance (Dynamic) Fair   Comments used hand rail due to occasionally using a walking stick at home   Gait Analysis   Gait Level Of Assist Modified Independent   Assistive Device   (hand rail)   Distance (Feet) 350   # of Times Distance was Traveled 1   Deviation   (forward trunk flexion)   # of Stairs Climbed   (functionally capable; able to high march)   Bed Mobility    Supine to Sit Independent  (sitting EOB to eat; states he got there on his own)   Sit to Supine   (remained sitting EOB to finish his meal)   Scooting Independent   Rolling Independent  (per patient)   Functional Mobility   Sit to Stand Modified Independent   Mobility in hallway   How much help from another person does the patient currently need...   6 clicks Mobility Score 24     Ivette Da Silva, PT

## 2021-08-03 NOTE — WOUND TEAM
Wound team consulted for patient's left arm and left second toe.  Skin assessed to be intact, no open wounds present.  Patient is on blood thinners and bruises easily.  Wound team is signing off. Please re-consult wound team if needed.

## 2021-09-01 ENCOUNTER — OFFICE VISIT (OUTPATIENT)
Dept: NEUROLOGY | Facility: MEDICAL CENTER | Age: 76
End: 2021-09-01
Attending: NURSE PRACTITIONER
Payer: MEDICARE

## 2021-09-01 VITALS
SYSTOLIC BLOOD PRESSURE: 134 MMHG | HEIGHT: 73 IN | BODY MASS INDEX: 31.38 KG/M2 | WEIGHT: 236.77 LBS | HEART RATE: 72 BPM | DIASTOLIC BLOOD PRESSURE: 64 MMHG | OXYGEN SATURATION: 97 % | TEMPERATURE: 97.6 F

## 2021-09-01 DIAGNOSIS — I48.0 PAROXYSMAL ATRIAL FIBRILLATION (HCC): ICD-10-CM

## 2021-09-01 DIAGNOSIS — I10 ESSENTIAL HYPERTENSION: ICD-10-CM

## 2021-09-01 DIAGNOSIS — E78.2 MIXED HYPERLIPIDEMIA: ICD-10-CM

## 2021-09-01 DIAGNOSIS — R29.818 TRANSIENT NEUROLOGICAL SYMPTOMS: ICD-10-CM

## 2021-09-01 DIAGNOSIS — I69.30 LATE EFFECT OF STROKE: ICD-10-CM

## 2021-09-01 PROCEDURE — 99215 OFFICE O/P EST HI 40 MIN: CPT | Performed by: NURSE PRACTITIONER

## 2021-09-01 PROCEDURE — 99212 OFFICE O/P EST SF 10 MIN: CPT | Performed by: NURSE PRACTITIONER

## 2021-09-01 ASSESSMENT — ENCOUNTER SYMPTOMS
HEARTBURN: 1
SPEECH CHANGE: 1
DOUBLE VISION: 0
BLURRED VISION: 0
NERVOUS/ANXIOUS: 0
VOMITING: 0
BRUISES/BLEEDS EASILY: 1
HEADACHES: 0
FOCAL WEAKNESS: 0
SENSORY CHANGE: 0
SINUS PAIN: 0
NAUSEA: 0
TINGLING: 0
WEAKNESS: 0
BACK PAIN: 0
FEVER: 0
FALLS: 1
COUGH: 0
NECK PAIN: 0
DEPRESSION: 0

## 2021-09-01 ASSESSMENT — FIBROSIS 4 INDEX: FIB4 SCORE: 2.45

## 2021-09-01 NOTE — PROGRESS NOTES
Subjective     HPI  Carl Blount is a 76 y.o. right handed male who presents to [facility] for evaluation of transient neurological symptoms and history of L MCA infarct.      He presented to [facility] on 8/2/2021 with complaints of right sided weakness and expressive aphasia.  His symptoms resolved quickly within 20 minutes.. Blood pressure 166/76 on admission. INR 1.40.  His Xarelto 20mg and ASA 81mg were continued at discharge.     When he had the  L MCA stroke in 2012, he had facial weakness on right and RUE/RLE weakness. He did not have speech difficulty.   He had another episode of speech difficulty in 12/2020 and saw HA Flores.    Incidentally his wife reports that he kept having periods of  confusion after his stroke and was told that he was having TIAs, he found out that he had atrial fibrillation.      PMH includes PAF, HLD, bilateral carotid artery stenosis, L CEA 2012, L MCA stroke 2012.  Social History:  Former smoker quit 2005,  Drinks a couple of drinks per night.     Review of Systems   Constitutional: Negative for fever.   HENT: Negative for congestion, hearing loss and sinus pain.    Eyes: Negative for blurred vision and double vision.   Respiratory: Negative for cough.    Cardiovascular: Negative for chest pain.   Gastrointestinal: Positive for heartburn. Negative for nausea and vomiting.   Genitourinary: Negative.    Musculoskeletal: Positive for falls. Negative for back pain and neck pain.        Had a fall about 3 months ago, he was walking in the garage and missed a step, he was seen in ER with CT scan. He doesn't remember anything about the episode.    Skin: Negative for rash.   Neurological: Positive for speech change. Negative for tingling, sensory change, focal weakness, weakness and headaches.   Endo/Heme/Allergies: Bruises/bleeds easily.   Psychiatric/Behavioral: Negative for depression. The patient is not nervous/anxious.      "    Objective      I personally reviewed imaging below and agree with the findings  MRI brain 8/2/2021  1.  No acute abnormality.  2.  A small area of encephalomalacia in the left frontal lobe.  3.  Mild cerebral atrophy.  4.  Mild chronic microvascular ischemic disease.  CTA head 8/2/2021   Negative for LVO  CTA neck 8/2/2021  1  There is bilateral atherosclerosis with estimated greater than 70% stenosis of the right ICA origin and less than 50% stenosis of the left ICA origin.  2.  There is no carotid artery occlusion.  3.  Vertebral basilar system is intact.    TTE:  LVEF 45%, mild concentric LVH, grade 1 diastolic dysfunction, LA size WNL, RODNEY 18  Stroke Labs:  Creat 0.70, LDL 48, A1C 5.6    /64 (BP Location: Left arm, Patient Position: Sitting, BP Cuff Size: Adult)   Pulse 72   Temp 36.4 °C (97.6 °F) (Temporal)   Ht 1.854 m (6' 1\")   Wt 107 kg (236 lb 12.4 oz)   SpO2 97%   BMI 31.24 kg/m²      PHYSICAL ASSESSMENT  Constitutional:  Alert, no apparent distress,  Psych:   mood and affect WNL  Muskuloskeletal:  Moves all extremities equally, strength 5/5 bilaterally, no drift  NEUROLOGICAL ASSESSMENT  Oriented X 4, speech fluent, naming and memory intact  CN II: Visual fields are full to confrontation. Fundoscopic exam is normal with sharp discs and no vascular changes. Pupils are 3 mm and briskly reactive to light.   CN III, IV, VI  EOMs intact, no ptosis  CN V: Facial sensation is intact to pinprick in all 3 divisions bilaterally. Corneal responses are intact.  CN VII: Face is symmetric with normal eye closure and smile.  CN VII: Hearing is normal to rubbing fingers  CN IX, X: Palate elevates symmetrically. Phonation is normal.  CN XI: Head turning and shoulder shrug are intact  CN XII: Tongue is midline with normal movements and no atrophy.                           Sensation to PP equal bilaterally                 No limb ataxia with finger to nose and heel to shin                 Ambulates with " "steady gait.                 Rhomberg negative                Biceps,brachioradialis, tricep 2+ bilatreal patella 1+    Cardiovascular:    S1S2, no abnormal rhythm auscultated, no peripheral edema  Neck:                     No carotid bruits noted   Pulmonary:            Respirations easy, lungs clear to auscultation all fields.     Skin:                     No obvious rashes.        Iniital NIHSS  Unknown       Current NIHSS    1a. LOC: 0  1b. LOC Questions: 0  1c. LOC Commands: 0  2. Best Gaze:0  3. Visual Fields: 0  4. Facial Paresis: 0  5a. Motor arm left:0   5b. Motor arm right: 0  6a. Motor leg left: 0  6b. Motor leg right: 0  7. Sensory: 0  8. Best Language: 0  9. Limb Ataxia: 0  10. Dysarthria: 0  11. Extinction/Inattention: 0    Total Score Current  0          Current mRS  0        Assessment & Plan     1. Transient neurological symptoms, he has had 2 episodes of transient aphasia and had a fall where he had no recollection of events; in addition, he had reported periods of confusion after his stroke.  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          Cannot rule out recrudescence but this would not explain the periods of \"confusion\"     24 hr EEG ordered.        2. Late effect of stroke L MCA territory infarct in 2012 secondary to Afib vs L carotid stenosis.     Presumed Mechanism by Toast:  __  Large Artery Atherosclerosis  __  Small Vessel (lacunar)  _XX_   Cardioembolic  __   Other (Sickle cell, vasculitis, hypercoagulable)  __   Unknown  __   ESUS      Stroke risk factors include Afib, carotid artery stenosis, HTN    3. Mixed hyperlipidemia  LDL goal < 70, current 48 continue Atorvastatin 80mg, discussed medication side effects, will need follow up with primary care evaluate liver function and intervals and refill  Exercise at least 30 minutes daily, avoid red meat, fried foods, butter, cheese.   Eat 5-6 servings of vegetables and fruits daily, choose lean white meat without " skin (chicken, turkey, white fish)--baked, broiled or grilled.      4. Essential hypertension  Blood pressure goal less than 130/80, currently slightly above goal, continue to monitor at home.    5. Paroxysmal atrial fibrillation (HCC)  Continue Xarelto 20mg PO every day with food, from a neurology stand-point, there is no utility in aspirin with Xarelto in his case as it is not likely any of his current symptoms represent TIA.  He should check with vascular surgery before stopping aspirin, I am not sure if it was recommended by Vascular.        If any new signs of stroke:  sudden weakness, numbness, speech difficulty (slurring or difficulty finding words), imbalance, incoordination, worse headache of life or vision loss occur, call 911.      Take all medications as prescribed unless instructed by your provider.    Stroke education given at discharge?  Yes      I spent a total of 68  minutes caring for patient,  my time includes counseling, review of systems, HPI and assessment, review of images, labs and testing as above.  I reviewed the hospital records, PMH, social and family history.   I have counseled patient on stroke prevention strategies, stroke symptoms and mimics.  Diet and exercise modifications.  We discussed medication side effects and instructions.       I have provided patient a written personalized stroke prevention plan, it is filed under the media tab under ‘[facility]”.         I will see him back in 3 months unless EEG is positive, then I will refer him to epilepsy.

## 2021-09-28 LAB — EKG IMPRESSION: NORMAL

## 2021-10-14 ENCOUNTER — APPOINTMENT (OUTPATIENT)
Dept: NEUROLOGY | Facility: MEDICAL CENTER | Age: 76
End: 2021-10-14
Attending: PSYCHIATRY & NEUROLOGY
Payer: MEDICARE

## 2021-11-03 ENCOUNTER — NON-PROVIDER VISIT (OUTPATIENT)
Dept: NEUROLOGY | Facility: MEDICAL CENTER | Age: 76
End: 2021-11-03
Attending: PSYCHIATRY & NEUROLOGY
Payer: MEDICARE

## 2021-11-03 DIAGNOSIS — R29.818 TRANSIENT NEUROLOGICAL SYMPTOMS: ICD-10-CM

## 2021-11-03 PROCEDURE — 95700 EEG CONT REC W/VID EEG TECH: CPT | Performed by: PSYCHIATRY & NEUROLOGY

## 2021-11-03 PROCEDURE — 95719 EEG PHYS/QHP EA INCR W/O VID: CPT | Performed by: PSYCHIATRY & NEUROLOGY

## 2021-11-03 PROCEDURE — 95708 EEG WO VID EA 12-26HR UNMNTR: CPT | Performed by: PSYCHIATRY & NEUROLOGY

## 2021-11-03 NOTE — PROCEDURES
24 HR AMBULATORY ELECTROENCEPHALOGRAM REPORT      Referring provider:  Rosie BONNER    DOS:   11/3/2021 with total duration 23 hours and 23 minutes.       INDICATION:  Carl Blount 76 y.o. male presenting with right sided weakness and expressive aphasia.     CURRENT ANTIEPILEPTIC REGIMEN: none      TECHNIQUE: A 30-channel, 23 hrs ambulatory electroencephalogram (aEEG) was performed in accordance with the international 10-20 system. This digital study was reviewed in bipolar and referential montages. The recording examined the patient during wakeful, drowsy and sleep states.     The EEG was set up and taken down by a Neurodiagnostic technologist who performed education to patient and staff.     A minimum but not limited to 23 electrodes and 23 channel recording was setup and performed by Neurodiagnostic technologist.    Impedances, electrode integrity, and technical impressions were documented a minimum of every 2-24 hour period by a Neurodiagnostic Technologist and reviewed by Interpreting physician.       DESCRIPTION OF THE RECORD:  During the awake state, background shows symmetrical 9-10 Hz alpha activity posteriorly with amplitude of 70 mV.  There was reactivity with eye opening/closure.  Normal anterior-posterior gradient was noted with faster beta frequencies seen anteriorly.  During drowsiness, high-amplitude delta frequencies were seen.    During the sleep state, background shows diffuse high-amplitude 4-5 Hz delta activity.  Symmetrical high-amplitude sleep spindles and vertex sharp activities were seen in the central leads.    ACTIVATION PROCEDURES:   hyperventilation was not performed    Intermittent Photic stimulation was performed in a stepwise fashion from 1 to 30 Hz and elicited a normal response (photic driving), most noticeable in the posterior leads.    ICTAL AND/OR INTERICTAL FINDINGS:   No focal or generalized epileptiform activity was noted. No regional slowing was seen during  this study.  No seizures were recorded during the study.     EVENT(S):  None     EKG: sampling review of EKG recording demonstrated PVCs        INTERPRETATION:   This is a  normal 23 hours ambulatory electroencephalogram recording in the awake, drowsy and sleep state.  No events were captured during the study. Clinical correlation is recommended.    Note: A normal electroencephalogram does not rule out epilepsy.       Quique Elaine MD  Diplomate in Neurology&Epilepsy  Office: [phone]  Fax: [phone]

## 2021-11-04 ENCOUNTER — NON-PROVIDER VISIT (OUTPATIENT)
Dept: NEUROLOGY | Facility: MEDICAL CENTER | Age: 76
End: 2021-11-04
Attending: NURSE PRACTITIONER
Payer: MEDICARE

## 2021-11-09 ENCOUNTER — TELEPHONE (OUTPATIENT)
Dept: NEUROLOGY | Facility: MEDICAL CENTER | Age: 76
End: 2021-11-09

## 2021-11-09 NOTE — TELEPHONE ENCOUNTER
Called pt to let him know that per Rosie, he did not need to F[facility] with her. He should see Alla from now on. Pt understood. Appt with Rosie cancelled.

## 2021-11-09 NOTE — TELEPHONE ENCOUNTER
Called pt to schedule him with Alla after being referred by Rosie. Pt agreed to appt on 12/10/21 at 10:40am. Pt was wondering if he still needed the FV appt with Rosie on 12/13/21. Please advise.

## 2021-11-29 DIAGNOSIS — E78.2 MIXED HYPERLIPIDEMIA: ICD-10-CM

## 2021-11-29 DIAGNOSIS — I10 ESSENTIAL HYPERTENSION: ICD-10-CM

## 2021-11-29 RX ORDER — ATORVASTATIN CALCIUM 80 MG/1
80 TABLET, FILM COATED ORAL
Qty: 90 TABLET | Refills: 3 | Status: ON HOLD | OUTPATIENT
Start: 2021-11-29 | End: 2022-04-25 | Stop reason: SDUPTHER

## 2021-11-29 RX ORDER — METOPROLOL SUCCINATE 25 MG/1
TABLET, EXTENDED RELEASE ORAL
Qty: 90 TABLET | Refills: 3 | Status: SHIPPED | OUTPATIENT
Start: 2021-11-29 | End: 2022-01-11

## 2021-12-03 NOTE — PROGRESS NOTES
Chief Complaint   Patient presents with   • New Patient     Seizures       Problem List Items Addressed This Visit     Transient neurological symptoms      Other Visit Diagnoses     Episodes of speech arrest        History of stroke              Interim History:  Carl Blount 76 y.o. male presents today with wife for seizure evaluation as referred by Rosie. I have seen him under [facility] early this year, as well.      Pt has so far 2 episodes of aphasia (Dec 2020 and Aug 2021). According to Rosie's note he also had bouts of confusion after his stroke which made her concerned that events might be related to seizures but pt and family denied this. They deny any staring off /zoning out spells either. They mentioned that he had a fall and hit his head possibly in May 2021. Did not lose consciousness but had no recollection of event. This is the only confusion episode that they know of. No convulsion or passing out spell. No further falls.     No family hx of seizures.     He drinks 2 cocktail drinks with vodka per night. No cigarettes or recreational drug use.     Mood is stable but can easily get stressed and anxious. No SI or HI.     He continues to take xarelto and asa. He is regularly f/u with Dr. Turpin. So far no surgical intervention recommended for the carotid stenosis.     He had 24hr EEG done.        Past medical history:   Past Medical History:   Diagnosis Date   • Afib (HCC)    • Hyperlipidemia    • Hypertension        Past surgical history:   No past surgical history on file.    Family history:   No family history on file.    Social history:   Social History     Socioeconomic History   • Marital status:      Spouse name: Not on file   • Number of children: Not on file   • Years of education: Not on file   • Highest education level: Not on file   Occupational History   • Not on file   Tobacco Use   • Smoking status: Former Smoker   • Smokeless tobacco: Never Used   Vaping Use    • Vaping Use: Never used   Substance and Sexual Activity   • Alcohol use: Yes     Comment: 2 drinks/night   • Drug use: Not on file   • Sexual activity: Not on file   Other Topics Concern   • Not on file   Social History Narrative   • Not on file     Social Determinants of Health     Financial Resource Strain:    • Difficulty of Paying Living Expenses: Not on file   Food Insecurity:    • Worried About Running Out of Food in the Last Year: Not on file   • Ran Out of Food in the Last Year: Not on file   Transportation Needs:    • Lack of Transportation (Medical): Not on file   • Lack of Transportation (Non-Medical): Not on file   Physical Activity:    • Days of Exercise per Week: Not on file   • Minutes of Exercise per Session: Not on file   Stress:    • Feeling of Stress : Not on file   Social Connections:    • Frequency of Communication with Friends and Family: Not on file   • Frequency of Social Gatherings with Friends and Family: Not on file   • Attends Shinto Services: Not on file   • Active Member of Clubs or Organizations: Not on file   • Attends Club or Organization Meetings: Not on file   • Marital Status: Not on file   Intimate Partner Violence:    • Fear of Current or Ex-Partner: Not on file   • Emotionally Abused: Not on file   • Physically Abused: Not on file   • Sexually Abused: Not on file   Housing Stability:    • Unable to Pay for Housing in the Last Year: Not on file   • Number of Places Lived in the Last Year: Not on file   • Unstable Housing in the Last Year: Not on file       Current medications:   Current Outpatient Medications   Medication   • metoprolol SR (TOPROL XL) 25 MG TABLET SR 24 HR   • atorvastatin (LIPITOR) 80 MG tablet   • zolpidem (AMBIEN) 10 MG Tab   • hydroxychloroquine (PLAQUENIL) 200 MG Tab   • therapeutic multivitamin-minerals (THERAGRAN-M) Tab   • cyanocobalamin (VITAMIN B12) 1000 MCG Tab   • Omega-3 Fatty Acids (FISH OIL) 1000 MG Cap capsule   • aspirin (ASA) 81 MG Chew  "Tab chewable tablet   • cyclobenzaprine (FLEXERIL) 10 MG Tab   • hydroCHLOROthiazide (HYDRODIURIL) 25 MG Tab   • lisinopril (PRINIVIL) 20 MG Tab   • pantoprazole (PROTONIX) 40 MG Tablet Delayed Response   • rivaroxaban (XARELTO) 20 MG Tab tablet   • tamsulosin (FLOMAX) 0.4 MG capsule     No current facility-administered medications for this visit.       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    Head: Denies headaches or dizziness or lightheadedness  Dermatologic:  Denies new rash  Musculoskeletal: Denies muscle pain or swelling, no atrophy, no neck and back pain or stiffness.   Neurologic: Denies facial droopiness, muscle weakness (focal or generalized), paresthesias, ataxia, memory loss, abnormal movements, seizures, loss of consciousness, or episodes of confusion. +episodes of aphasia  Psychiatric: Denies  depression, mood swings, suicidal or homicidal thoughts. +anxiety       Physical examination:   Vitals:    12/10/21 1033   BP: 136/56   BP Location: Left arm   Patient Position: Sitting   BP Cuff Size: Adult   Pulse: 85   Temp: 36.1 °C (97 °F)   TempSrc: Temporal   SpO2: 97%   Weight: 105 kg (231 lb 11.3 oz)   Height: 1.854 m (6' 1\")     General: Patient in no acute distress, pleasant and cooperative.  HEENT: Normocephalic, no signs of acute trauma.   Neck: Supple. There is normal range of motion. No carotid bruits  Resp: clear to auscultation bilaterally. No wheezes or crackles.   CV: irregular heart rhythm, no murmurs.   Skin: no signs of acute rashes or trauma.   Musculoskeletal: joints exhibit full range of motion  Psychiatric: No hallucinatory behavior. No symptoms of depression or suicidal ideation. Mood and affect appear normal on exam.     NEUROLOGICAL EXAM:   Mental status, orientation: Awake, alert and " fully oriented.   Speech and language: speech is clear and fluent. The patient is able to name, repeat and comprehend.   Memory: There is intact recollection of recent and remote events.   Cranial nerve exam:   CN I: Not examined   CN II: PERRL.   CN III, IV, VI: EOMI; no nystagmus   CN V: Facial sensation intact bilaterally   CN VII: face symmetric   CN VIII: hearing intact to finger rub bilaterally   CN IX, X: palate elevates symmetrically   CN XI: Symmetric shoulder shrug  CN XII: tongue midline.   Motor exam: Strength is 5/5 in all extremities. Tone is normal. No abnormal movements were seen on exam.   Sensory exam reveals normal sense of light touch in all extremities.   Deep tendon reflexes:  Absent ankle and patellar jerks, 2+ throughout.  Gait: The patient was able to get up from seated position on first attempt without requiring assistance. Found to be mildly unsteady with walking       ANCILLARY DATA REVIEWED:       Lab Data Review:  Reviewed in chart.    Records reviewed:   Reviewed in chart.    Imaging:   MRI brain 8/3/21  1.  No acute abnormality.  2.  A small area of encephalomalacia in the left frontal lobe.  3.  Mild cerebral atrophy.  4.  Mild chronic microvascular ischemic disease.    EEhr EEG 11/3/21  This is a  normal 23 hours ambulatory electroencephalogram recording in the awake, drowsy and sleep state.  No events were captured during the study. Clinical correlation is recommended.        ASSESSMENT AND PLAN:  1. Transient neurological symptoms    2. Episodes of speech arrest    3. History of stroke    4. At risk for falls    5. Alcohol use          CLINICAL DISCUSSION:  Transient neurological symptom of transient aphasia. TIA vs recrudescence vs seizure.  2 episodes so far (Dec 2020 and Aug 2021). Pt has hx of L MCA stroke, hx of L CEA and has R carotid stenosis that he is regularly seeing a vascular surgeon, Dr. Turpin. Currently on asa and xarelto. Pt never had syncopal episode or  convulsion. No staring off spells or confusion episodes. Has hx of head concussion in May 2021 with no LOC but had no recollection of event afterwards. 24hr EEG was normal but aware that normal EEG does not r/o epilepsy.     No family hx of seizures.     Drinks vodka everyday. No cigarettes or recreational drug use.     Mood is stable. Can be easily stressed and anxious. No suicidal or homicidal thoughts.     Past ASM's: none    Current ASM's: none      Plan:  - At this point, I do not see the need to start ASM as symptom is less frequent and sporadic. No other symptoms that are concerning for seizures.     -Discussed avoidance or limiting intake of alcohol given that he is on both antiplatelet and anticoagulation.     -Discussed fall precaution and recommended use of assistive devices.     - Discussed avoidance of spell/sz triggers: alcohol, sleep deprivation, and stress.      FOLLOW-UP:   Return in about 6 months (around 6/10/2022).      EDUCATION AND COUNSELING:  -Education was provided to the patient and/or family regarding diagnosis and prognosis. The chronic and unpredictable nature of the condition were discussed. There is increased risk for additional events, which may carry potential for significant injuries and death. Discussed frequent seizure triggers: sleep deprivation, medication non-compliance, use of illegal drugs/alcohol, stress, and others.         Alla Batista, MSN, APRN, FNP-C  [facility]  Office: [phone]  Fax: [phone]    BILLING DOCUMENTATION:   Total time spent including chart review before and after visit was 60min. Over 50% of the time of the visit today was spent on counseling and or coordination of care wtih the patient and/or family, with greater than 50% of the total discussing my assessment and plan as stated above.

## 2021-12-10 ENCOUNTER — OFFICE VISIT (OUTPATIENT)
Dept: NEUROLOGY | Facility: MEDICAL CENTER | Age: 76
End: 2021-12-10
Attending: NURSE PRACTITIONER
Payer: MEDICARE

## 2021-12-10 VITALS
BODY MASS INDEX: 30.71 KG/M2 | SYSTOLIC BLOOD PRESSURE: 136 MMHG | OXYGEN SATURATION: 97 % | DIASTOLIC BLOOD PRESSURE: 56 MMHG | WEIGHT: 231.7 LBS | HEART RATE: 85 BPM | HEIGHT: 73 IN | TEMPERATURE: 97 F

## 2021-12-10 DIAGNOSIS — Z86.73 HISTORY OF STROKE: ICD-10-CM

## 2021-12-10 DIAGNOSIS — Z78.9 ALCOHOL USE: ICD-10-CM

## 2021-12-10 DIAGNOSIS — Z91.81 AT RISK FOR FALLS: ICD-10-CM

## 2021-12-10 DIAGNOSIS — R47.89 EPISODES OF SPEECH ARREST: ICD-10-CM

## 2021-12-10 DIAGNOSIS — R29.818 TRANSIENT NEUROLOGICAL SYMPTOMS: ICD-10-CM

## 2021-12-10 PROCEDURE — 99215 OFFICE O/P EST HI 40 MIN: CPT | Performed by: NURSE PRACTITIONER

## 2021-12-10 PROCEDURE — 99212 OFFICE O/P EST SF 10 MIN: CPT | Performed by: NURSE PRACTITIONER

## 2021-12-10 RX ORDER — AMOXICILLIN 500 MG/1
TABLET, FILM COATED ORAL PRN
COMMUNITY
Start: 2021-10-06 | End: 2022-04-24

## 2021-12-10 ASSESSMENT — FIBROSIS 4 INDEX: FIB4 SCORE: 2.45

## 2022-01-11 ENCOUNTER — OFFICE VISIT (OUTPATIENT)
Dept: CARDIOLOGY | Facility: MEDICAL CENTER | Age: 77
End: 2022-01-11
Payer: MEDICARE

## 2022-01-11 VITALS
SYSTOLIC BLOOD PRESSURE: 120 MMHG | OXYGEN SATURATION: 97 % | HEART RATE: 70 BPM | RESPIRATION RATE: 14 BRPM | BODY MASS INDEX: 30.75 KG/M2 | HEIGHT: 73 IN | DIASTOLIC BLOOD PRESSURE: 60 MMHG | WEIGHT: 232 LBS

## 2022-01-11 DIAGNOSIS — I48.0 PAROXYSMAL ATRIAL FIBRILLATION (HCC): ICD-10-CM

## 2022-01-11 DIAGNOSIS — I77.72 ILIAC DISSECTION (HCC): ICD-10-CM

## 2022-01-11 DIAGNOSIS — I25.10 CORONARY ARTERY DISEASE INVOLVING NATIVE HEART WITHOUT ANGINA PECTORIS, UNSPECIFIED VESSEL OR LESION TYPE: ICD-10-CM

## 2022-01-11 DIAGNOSIS — E78.2 MIXED HYPERLIPIDEMIA: ICD-10-CM

## 2022-01-11 DIAGNOSIS — I10 ESSENTIAL HYPERTENSION: ICD-10-CM

## 2022-01-11 DIAGNOSIS — Z86.73 HISTORY OF CARDIOEMBOLIC CEREBROVASCULAR ACCIDENT (CVA): ICD-10-CM

## 2022-01-11 DIAGNOSIS — G45.9 TIA (TRANSIENT ISCHEMIC ATTACK): ICD-10-CM

## 2022-01-11 DIAGNOSIS — I65.23 BILATERAL CAROTID ARTERY STENOSIS: ICD-10-CM

## 2022-01-11 DIAGNOSIS — Z98.890 HISTORY OF CAROTID ENDARTERECTOMY: ICD-10-CM

## 2022-01-11 PROCEDURE — 99214 OFFICE O/P EST MOD 30 MIN: CPT | Performed by: INTERNAL MEDICINE

## 2022-01-11 RX ORDER — TRAZODONE HYDROCHLORIDE 100 MG/1
100 TABLET ORAL NIGHTLY
COMMUNITY

## 2022-01-11 RX ORDER — TAMSULOSIN HYDROCHLORIDE 0.4 MG/1
1 CAPSULE ORAL DAILY
COMMUNITY
Start: 2022-01-04 | End: 2022-01-11

## 2022-01-11 RX ORDER — LIDOCAINE 50 MG/G
1 PATCH TOPICAL
COMMUNITY

## 2022-01-11 RX ORDER — METOPROLOL SUCCINATE 50 MG/1
50 TABLET, EXTENDED RELEASE ORAL DAILY
Qty: 90 TABLET | Refills: 3 | Status: SHIPPED | OUTPATIENT
Start: 2022-01-11 | End: 2024-01-04

## 2022-01-11 ASSESSMENT — ENCOUNTER SYMPTOMS
ORTHOPNEA: 0
FLANK PAIN: 0
DYSPNEA ON EXERTION: 0
PALPITATIONS: 0
PND: 0
NEAR-SYNCOPE: 0
ABDOMINAL PAIN: 0
WEIGHT GAIN: 0
WEIGHT LOSS: 0
SYNCOPE: 0
DIARRHEA: 0
COUGH: 0
HEARTBURN: 0
BACK PAIN: 0
DEPRESSION: 0
FEVER: 0
IRREGULAR HEARTBEAT: 0
NAUSEA: 0
BLURRED VISION: 0
CONSTIPATION: 0
SHORTNESS OF BREATH: 0
CLAUDICATION: 0
DIZZINESS: 0
VOMITING: 0
ALTERED MENTAL STATUS: 0
DECREASED APPETITE: 0

## 2022-01-11 ASSESSMENT — FIBROSIS 4 INDEX: FIB4 SCORE: 2.45

## 2022-01-11 NOTE — PATIENT INSTRUCTIONS
Metoprolol extended-release tablets  What is this medicine?  METOPROLOL (me TOE proe lole) is a beta-blocker. Beta-blockers reduce the workload on the heart and help it to beat more regularly. This medicine is used to treat high blood pressure and to prevent chest pain. It is also used to after a heart attack and to prevent an additional heart attack from occurring.  This medicine may be used for other purposes; ask your health care provider or pharmacist if you have questions.  COMMON BRAND NAME(S): toprol, Toprol XL  What should I tell my health care provider before I take this medicine?  They need to know if you have any of these conditions:  · diabetes  · heart or vessel disease like slow heart rate, worsening heart failure, heart block, sick sinus syndrome or Raynaud's disease  · kidney disease  · liver disease  · lung or breathing disease, like asthma or emphysema  · pheochromocytoma  · thyroid disease  · an unusual or allergic reaction to metoprolol, other beta-blockers, medicines, foods, dyes, or preservatives  · pregnant or trying to get pregnant  · breast-feeding  How should I use this medicine?  Take this medicine by mouth with a glass of water. Follow the directions on the prescription label. Do not crush or chew. Take this medicine with or immediately after meals. Take your doses at regular intervals. Do not take more medicine than directed. Do not stop taking this medicine suddenly. This could lead to serious heart-related effects.  Talk to your pediatrician regarding the use of this medicine in children. While this drug may be prescribed for children as young as 6 years for selected conditions, precautions do apply.  Overdosage: If you think you have taken too much of this medicine contact a poison control center or emergency room at once.  NOTE: This medicine is only for you. Do not share this medicine with others.  What if I miss a dose?  If you miss a dose, take it as soon as you can. If it is  almost time for your next dose, take only that dose. Do not take double or extra doses.  What may interact with this medicine?  This medicine may interact with the following medications:  · certain medicines for blood pressure, heart disease, irregular heart beat  · certain medicines for depression, like monoamine oxidase (MAO) inhibitors, fluoxetine, or paroxetine  · clonidine  · dobutamine  · epinephrine  · isoproterenol  · reserpine  This list may not describe all possible interactions. Give your health care provider a list of all the medicines, herbs, non-prescription drugs, or dietary supplements you use. Also tell them if you smoke, drink alcohol, or use illegal drugs. Some items may interact with your medicine.  What should I watch for while using this medicine?  Visit your doctor or health care professional for regular check ups. Contact your doctor right away if your symptoms worsen. Check your blood pressure and pulse rate regularly. Ask your health care professional what your blood pressure and pulse rate should be, and when you should contact them.  You may get drowsy or dizzy. Do not drive, use machinery, or do anything that needs mental alertness until you know how this medicine affects you. Do not sit or stand up quickly, especially if you are an older patient. This reduces the risk of dizzy or fainting spells. Contact your doctor if these symptoms continue. Alcohol may interfere with the effect of this medicine. Avoid alcoholic drinks.  This medicine may increase blood sugar. Ask your healthcare provider if changes in diet or medicines are needed if you have diabetes.  What side effects may I notice from receiving this medicine?  Side effects that you should report to your doctor or health care professional as soon as possible:  · allergic reactions like skin rash, itching or hives  · cold or numb hands or feet  · depression  · difficulty breathing  · faint  · fever with sore throat  · irregular  heartbeat, chest pain  · rapid weight gain  ·   signs and symptoms of high blood sugar such as being more thirsty or hungry or having to urinate more than normal. You may also feel very tired or have blurry vision.  · swollen legs or ankles  Side effects that usually do not require medical attention (report to your doctor or health care professional if they continue or are bothersome):  · anxiety or nervousness  · change in sex drive or performance  · dry skin  · headache  · nightmares or trouble sleeping  · short term memory loss  · stomach upset or diarrhea  This list may not describe all possible side effects. Call your doctor for medical advice about side effects. You may report side effects to FDA at 0-111-JJH-7505.  Where should I keep my medicine?  Keep out of the reach of children.  Store at room temperature between 15 and 30 degrees C (59 and 86 degrees F). Throw away any unused medicine after the expiration date.  NOTE: This sheet is a summary. It may not cover all possible information. If you have questions about this medicine, talk to your doctor, pharmacist, or health care provider.  © 2020 [employer] (2019-10-08 11:09:41)

## 2022-01-11 NOTE — PROGRESS NOTES
Cardiology Note    Chief Complaint   Patient presents with   • Hypertension     F/V Dx: Essential hypertension   • Atrial Fibrillation     F/V Dx: aroxysmal atrial fibrillation (HCC)   • Hyperlipidemia     F/V Dx: Mixed hyperlipidemia       History of Present Illness: Carl Blount is a 76 y.o. male PMH CAD/PCI LONG RCA 4/2018, L CEA c/b L ECA chronic dissection, CVA ischemic multiple / TIA, paroxysmal atrial fibrillation found on loop recorder, HLD, HTN, chronic R iliac dissection who presents for follow up.     Doing well this visit. No active cardiovascular complaints. Compliant with medications and denies adverse effects. Following with neuro lately concern for seizure activity but eeg unrevealing to date. Pending follow up with vascular surgery. [facility] and [facility] being [facility].     Review of Systems   Constitutional: Negative for decreased appetite, fever, malaise/fatigue, weight gain and weight loss.   HENT: Negative for congestion and nosebleeds.    Eyes: Negative for blurred vision.   Cardiovascular: Negative for chest pain, claudication, dyspnea on exertion, irregular heartbeat, leg swelling, near-syncope, orthopnea, palpitations, paroxysmal nocturnal dyspnea and syncope.   Respiratory: Negative for cough and shortness of breath.    Endocrine: Negative for cold intolerance and heat intolerance.   Skin: Negative for rash.   Musculoskeletal: Negative for back pain.   Gastrointestinal: Negative for abdominal pain, constipation, diarrhea, heartburn, melena, nausea and vomiting.   Genitourinary: Negative for dysuria, flank pain and hematuria.   Neurological: Negative for dizziness.   Psychiatric/Behavioral: Negative for altered mental status and depression.         Past Medical History:   Diagnosis Date   • Afib (HCC)    • Hyperlipidemia    • Hypertension          History reviewed. No pertinent surgical history.      Current Outpatient Medications   Medication Sig Dispense  Refill   • lidocaine (LIDODERM) 5 % Patch Place 1 Patch on the skin every 24 hours.     • LORAZEPAM ER PO Take  by mouth.     • traZODone (DESYREL) 100 MG Tab Take 100 mg by mouth every evening.     • metoprolol SR (TOPROL XL) 50 MG TABLET SR 24 HR Take 1 Tablet by mouth every day. 90 Tablet 3   • Amoxicillin 500 MG Tab as needed.     • atorvastatin (LIPITOR) 80 MG tablet TAKE 1 TABLET BY MOUTH AT  BEDTIME 90 Tablet 3   • hydroxychloroquine (PLAQUENIL) 200 MG Tab Take 200 mg by mouth every day.     • therapeutic multivitamin-minerals (THERAGRAN-M) Tab Take 1 tablet by mouth every day.     • cyanocobalamin (VITAMIN B12) 1000 MCG Tab Take 1,000 mcg by mouth every day.     • Omega-3 Fatty Acids (FISH OIL) 1000 MG Cap capsule Take 1,000 mg by mouth every day.     • aspirin (ASA) 81 MG Chew Tab chewable tablet Chew 81 mg at bedtime. 100 tablet    • cyclobenzaprine (FLEXERIL) 10 MG Tab Take 10 mg by mouth 3 times a day as needed for Muscle Spasms.     • hydroCHLOROthiazide (HYDRODIURIL) 25 MG Tab Take 25 mg by mouth every day.     • lisinopril (PRINIVIL) 20 MG Tab Take 20 mg by mouth every day.     • pantoprazole (PROTONIX) 40 MG Tablet Delayed Response Take 40 mg by mouth every day.     • rivaroxaban (XARELTO) 20 MG Tab tablet Take 20 mg by mouth with dinner.     • tamsulosin (FLOMAX) 0.4 MG capsule Take 0.4 mg by mouth at bedtime.     • zolpidem (AMBIEN) 10 MG Tab Take 10 mg by mouth at bedtime as needed for Sleep.       No current facility-administered medications for this visit.         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 No pertinent family history.      Social History     Socioeconomic History   • Marital status:      Spouse name: Not on file   • Number of children: Not on file   • Years of education: Not on file   • Highest education level: Not on file   Occupational History   • Not on file   Tobacco Use  "  • Smoking status: Former Smoker   • Smokeless tobacco: Never Used   Vaping Use   • Vaping Use: Never used   Substance and Sexual Activity   • Alcohol use: Yes     Comment: 2 drinks/night   • Drug use: Never   • Sexual activity: Not on file   Other Topics Concern   • Not on file   Social History Narrative   • Not on file     Social Determinants of Health     Financial Resource Strain:    • Difficulty of Paying Living Expenses: Not on file   Food Insecurity:    • Worried About Running Out of Food in the Last Year: Not on file   • Ran Out of Food in the Last Year: Not on file   Transportation Needs:    • Lack of Transportation (Medical): Not on file   • Lack of Transportation (Non-Medical): Not on file   Physical Activity:    • Days of Exercise per Week: Not on file   • Minutes of Exercise per Session: Not on file   Stress:    • Feeling of Stress : Not on file   Social Connections:    • Frequency of Communication with Friends and Family: Not on file   • Frequency of Social Gatherings with Friends and Family: Not on file   • Attends Sikhism Services: Not on file   • Active Member of Clubs or Organizations: Not on file   • Attends Club or Organization Meetings: Not on file   • Marital Status: Not on file   Intimate Partner Violence:    • Fear of Current or Ex-Partner: Not on file   • Emotionally Abused: Not on file   • Physically Abused: Not on file   • Sexually Abused: Not on file   Housing Stability:    • Unable to Pay for Housing in the Last Year: Not on file   • Number of Places Lived in the Last Year: Not on file   • Unstable Housing in the Last Year: Not on file         Physical Exam:  Ambulatory Vitals  /60 (BP Location: Left arm, Patient Position: Sitting, BP Cuff Size: Adult)   Pulse 70   Resp 14   Ht 1.854 m (6' 1\")   Wt 105 kg (232 lb)   SpO2 97%    BP Readings from Last 4 Encounters:   01/11/22 120/60   12/10/21 136/56   09/01/21 134/64   08/03/21 159/75     Weight/BMI:   Vitals:    01/11/22 " "1259   BP: 120/60   Weight: 105 kg (232 lb)   Height: 1.854 m (6' 1\")    Body mass index is 30.61 kg/m².  Wt Readings from Last 4 Encounters:   01/11/22 105 kg (232 lb)   12/10/21 105 kg (231 lb 11.3 oz)   09/24/21 107 kg (236 lb)   09/01/21 107 kg (236 lb 12.4 oz)       Physical Exam  Constitutional:       General: He is not in acute distress.  HENT:      Head: Normocephalic and atraumatic.   Eyes:      Conjunctiva/sclera: Conjunctivae normal.      Pupils: Pupils are equal, round, and reactive to light.   Neck:      Vascular: No JVD.   Cardiovascular:      Rate and Rhythm: Normal rate and regular rhythm.      Heart sounds: Normal heart sounds. No murmur heard.  No friction rub. No gallop.    Pulmonary:      Effort: Pulmonary effort is normal. No respiratory distress.      Breath sounds: Normal breath sounds. No wheezing or rales.   Chest:      Chest wall: No tenderness.   Abdominal:      General: Bowel sounds are normal. There is no distension.      Palpations: Abdomen is soft.   Musculoskeletal:      Cervical back: Normal range of motion and neck supple.   Skin:     General: Skin is warm and dry.   Neurological:      Mental Status: He is alert and oriented to person, place, and time.   Psychiatric:         Mood and Affect: Affect normal.         Judgment: Judgment normal.         Lab Data Review:  Lab Results   Component Value Date/Time    CHOLSTRLTOT 121 08/03/2021 03:20 AM    LDL 48 08/03/2021 03:20 AM    HDL 58 08/03/2021 03:20 AM    TRIGLYCERIDE 74 08/03/2021 03:20 AM       Lab Results   Component Value Date/Time    SODIUM 140 08/02/2021 08:04 PM    POTASSIUM 3.6 08/02/2021 08:04 PM    CHLORIDE 103 08/02/2021 08:04 PM    CO2 23 08/02/2021 08:04 PM    GLUCOSE 104 (H) 08/02/2021 08:04 PM    BUN 14 08/02/2021 08:04 PM    CREATININE 0.70 08/02/2021 08:04 PM     CrCl cannot be calculated (Patient's most recent lab result is older than the maximum 7 days allowed.).  Lab Results   Component Value Date/Time    " ALKPHOSPHAT 105 (H) 08/02/2021 08:04 PM    ASTSGOT 33 08/02/2021 08:04 PM    ALTSGPT 26 08/02/2021 08:04 PM    TBILIRUBIN 0.4 08/02/2021 08:04 PM      Lab Results   Component Value Date/Time    WBC 8.8 08/02/2021 08:04 PM     Lab Results   Component Value Date/Time    HBA1C 5.6 08/02/2021 09:55 PM     No components found for: TROP    Outside labs 06/2020  Tot chol 128, trig 40, HDL 57, LDL 63  U/a wnl, a1c 5.7, cbc wnl, bmp wnl, micro alb / creat <3.6 mg/g wnl, TSH wnl    Outside labs 6/2021  -LDL 73, HDL 58, tot chol 145, trig 72    Cardiac Imaging and Procedures Review:      Carotid doppler 7/29/20  FINDINGS   RIGHT:   Moderate appearing calcific plaque of the bifurcation extending into the    internal carotid yet velocities are consistent with < 50% stenosis of the    internal carotid artery.    LEFT:   Patent internal carotid artery with no hemodynamically significant plaque    following endarterectomy.   Bilateral subclavian and vertebral artery waveforms are antegrade and    waveforms are normal in character and velocity.     MPI 2/2018 outside study  1. Quality of study: good.  2. There s is TID.  3. The LV volumes are enlarged..  4. SPECT images demonstrate infarct of the inferolateral wall with minor ama-infarct ischemia.  5. The gate stress LVEF is 53%    [facility] 04/2018 outside study  Procedure Performed:  1. R femoral artery access  2. Left heart catheterization with selective coronary angiography  3. PCI ostial RCA - XIENCE 2.25 x 12mm post dilated NC trek 2.5 x 12 to 20 narciso  4. Perclose R CFA  Summary of Findings:  1. 99.9% ostial RCA  2. Left main no disease  3. LAD minimal disease  4. LCx no disease  5. LVEDP 8mmHg  6. Chronic dissection R external iliac artery dual lumen    Carotid artery ultrasound 2013 Josias Shanks outside study  1.  Compared to carotid arterial ultrasonography performed 2/15/2012-     a)   Left carotid arterial bifurcation has returned to normal  appearance status post endarterectomy.      b)   Mild-moderate degree right carotid bulb and proximal right  internal carotid arterial plaque without evidence of hemodynamically  significant disease is present and not substantially changed.    c)  There remains normal antegrade flow within the cervical right  and left vertebral arteries.    CTA neck s/p CEA 2012  1. Postoperative change compatible with interval left carotid  endarterectomy. The left common and internal carotid arteries appear  patent along their course.  2. Focal dissection flap is present within the proximal left external  carotid artery, without evidence of distal occlusion.  3. Stable atherosclerotic disease and stenosis of the right common  and internal carotid arteries, as described above.  4. Stable atherosclerotic disease of the origin of the right  vertebral artery.    TTE 2012  Interpretation Summary  1.  Negative contrast study for interatrial shunt. 2.    Mild LVH with normal LV size and systolic function. No   significant valvular abnormality noted. 3.  No prior   study for comparison.    CTA neck 2/2021  1.  Severe proximal/mid right internal carotid artery calcified plaque with 65-70% diameter stenosis.  2.  No significant left carotid artery stenosis.  3.  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  4.  Severe stenosis at the origin of the right vertebral artery.  5.  Moderate proximal left vertebral artery stenosis.    CTA head 2/2021  1.  Moderate chronic left MCA territory infarct with asymmetrically small M2 segment left MCA branch.  2.  No large vessel occlusion or aneurysm.    TTE 4/19/21  CONCLUSIONS  Fair quality study, improved with contrast.  Negative bubble study including Valsalva.  Mildly reduced LV systolic function, LV EF   50% with [id number]   variability due to frequent ectopy.  Wall motion is difficult to assess due to beat to beat  variation, no   clifton wall motion abnormalities.  Abnormal septal motion consistent with conduction delay.  Right ventricle not well visualized, grossly normal size and function.  Probably mild mitral annular calcification.  The aortic valve is not well visualized, probably trileaflet with   sclerosis.  Unable to estimate RVSP/RAP.    TTE 8/2021  CONCLUSIONS  Left ventricular systolic function is mildly reduced. Left ventricular   ejection fraction is visually estimated to be 45%. Grade I diastolic   dysfunction.  Normal right ventricular size and systolic function.  Mild mitral regurgitation.  Mild tricuspid regurgitation.  Estimated right ventricular systolic pressure is  20 mmHg; normal.  No pericardial effusion seen.    Medical Decision Making:  Problem List Items Addressed This Visit     Essential hypertension    Relevant Medications    metoprolol SR (TOPROL XL) 50 MG TABLET SR 24 HR    Bilateral carotid artery stenosis    Relevant Medications    metoprolol SR (TOPROL XL) 50 MG TABLET SR 24 HR    Other Relevant Orders    US-CAROTID DOPPLER BILAT    Coronary artery disease involving native heart without angina pectoris    Relevant Medications    metoprolol SR (TOPROL XL) 50 MG TABLET SR 24 HR    History of cardioembolic cerebrovascular accident (CVA)    Relevant Orders    Referral to Anticoagulation Monitoring    Mixed hyperlipidemia    Relevant Medications    metoprolol SR (TOPROL XL) 50 MG TABLET SR 24 HR    History of carotid endarterectomy    Relevant Orders    US-CAROTID DOPPLER BILAT    Iliac dissection (HCC) history    Relevant Medications    metoprolol SR (TOPROL XL) 50 MG TABLET SR 24 HR    Paroxysmal atrial fibrillation (HCC)    Relevant Medications    metoprolol SR (TOPROL XL) 50 MG TABLET SR 24 HR    Other Relevant Orders    Referral to Anticoagulation Monitoring    TIA (transient ischemic attack)    Relevant Medications    metoprolol SR (TOPROL XL) 50 MG TABLET SR 24 HR        ICM, HFmrEF - NYHA I,  stage C - asymptomatic. Continue ACEi and BB. Titrate metoprolol.    CAD / HLD / hx CVA w carotid stenosis - Continue doac and aspirin given multivessel disease. LDL goal <70 and trig <150. Continue statin. Annual lipids.    Paroxysmal AF - chadsvasc 6 - continue xarelto for cva prevention. Continue metoprolol for rate control. If unwilling to continue doac until day prior to procedure will need bridging with lovenox due to elevated risk cva.     HTN - controlled at home. Goal 120/80. Continue current regimen.    It was my pleasure to meet with Mr. Blount.

## 2022-01-12 ENCOUNTER — TELEPHONE (OUTPATIENT)
Dept: VASCULAR LAB | Facility: MEDICAL CENTER | Age: 77
End: 2022-01-12

## 2022-01-12 NOTE — TELEPHONE ENCOUNTER
"[facility]    Received anticoag referral for Xarelto due to AF from Dr. Davis on 1/11/22    Per referral: \"Elevated chadsvasc. In need for bridging to colonoscopy. Needs help with injections.\"    Called pt to schedule appt to establish care - no answer. LVM.    Insurance: [employer]  PCP: [facility]  Locations to be seen: [address]    [facility] at [phone], fax [phone]    Alexis Wagner, GeovannaD, BCACP      "

## 2022-01-14 ENCOUNTER — ANTICOAGULATION VISIT (OUTPATIENT)
Dept: VASCULAR LAB | Facility: MEDICAL CENTER | Age: 77
End: 2022-01-14
Attending: INTERNAL MEDICINE
Payer: MEDICARE

## 2022-01-14 DIAGNOSIS — I48.91 ATRIAL FIBRILLATION, UNSPECIFIED TYPE (HCC): ICD-10-CM

## 2022-01-14 PROCEDURE — 99212 OFFICE O/P EST SF 10 MIN: CPT

## 2022-01-14 NOTE — PROGRESS NOTES
NEW DOAC   .  Anticoagulation Patient Findings      PCP: Zuleyma Cuevas M.D.  Cardiologist: Juice Davis MD  Dx: AF  CHADSVASC = 6  HAS-BLED = 4  Target End Date = indefinite    Pt Hx: Patient chronically on Xarelto for AF/history of CVA, was referred for instructions on bridging for upcoming colonoscopy procedure. Patient does not have date of procedure scheduled at this time. Has previously taken Lovenox shots for bridging, but per most recent cardiology note, ok to bridge with DOAC.     Labs:  Lab Results   Component Value Date/Time    WBC 8.8 08/02/2021 08:04 PM    RBC 4.28 (L) 08/02/2021 08:04 PM    HEMOGLOBIN 14.1 08/02/2021 08:04 PM    HEMATOCRIT 41.9 (L) 08/02/2021 08:04 PM    MCV 97.9 (H) 08/02/2021 08:04 PM    MCH 32.9 08/02/2021 08:04 PM    MCHC 33.7 08/02/2021 08:04 PM    MPV 10.5 08/02/2021 08:04 PM    NEUTSPOLYS 52.80 08/02/2021 08:04 PM    LYMPHOCYTES 31.10 08/02/2021 08:04 PM    MONOCYTES 12.00 08/02/2021 08:04 PM    EOSINOPHILS 3.20 08/02/2021 08:04 PM    BASOPHILS 0.70 08/02/2021 08:04 PM      Lab Results   Component Value Date/Time    SODIUM 140 08/02/2021 08:04 PM    POTASSIUM 3.6 08/02/2021 08:04 PM    CHLORIDE 103 08/02/2021 08:04 PM    CO2 23 08/02/2021 08:04 PM    GLUCOSE 104 (H) 08/02/2021 08:04 PM    BUN 14 08/02/2021 08:04 PM    CREATININE 0.70 08/02/2021 08:04 PM          Pt is NOT new to Xarelto but new to [facility]. Discussed:   · Indication for DOAC therapy.  · Importance of monitoring and compliance.   · Procedures for missed doses or suspected missed doses, surgeries/procedures, travel, dental work, any medication changes    Take Xarelto 20 mg by mouth daily. Patient is familiar with the medication and does not have further questions besides bridging at this time.     F/U - Patient to call when colonoscopy procedure scheduled for definitive dates of bridging. Discussed that typically, will hold Xarelto for 48 hours prior to procedure and resume evening of procedure pending bleed  risk. Patient to follow up with clinic once procedure date is confirmed for specific instructions.     Lisa Patel PharmD  PGY1 Pharmacy Practice Resident

## 2022-01-14 NOTE — Clinical Note
Hi Dr Bloch,  Chronic Xarelto patient here for bridging instructions prior to colonoscopy. Patient does not have a date scheduled for colonoscopy yet, was instructed to call clinic back once he has a date for specific instructions on bridging.

## 2022-01-15 ENCOUNTER — PATIENT MESSAGE (OUTPATIENT)
Dept: CARDIOLOGY | Facility: MEDICAL CENTER | Age: 77
End: 2022-01-15

## 2022-01-15 ENCOUNTER — SUPERVISING PHYSICIAN REVIEW (OUTPATIENT)
Dept: VASCULAR LAB | Facility: MEDICAL CENTER | Age: 77
End: 2022-01-15

## 2022-01-15 NOTE — PROGRESS NOTES
Initial anticoag note and most recent cards note reviewed.  Patient with afib and chads vasc = 6 with h/o cva nd ASCVD    Pending further recommendations, we will continue with indefinite anticoagulation with eliquis as directed by cards    Given h/o complex ascvd cards recommends continuing concomitant asa - will defer to that recommendation.    Patient with upcoming colonoscopy - high risk patient with moderate risk procedure. Would favor hold 24 hour as cards recommends    Will defer all management of rhythm, rate, and other cv issues, aside from anticoagulation, to cards    Michael Bloch, MD  Anticoagulation Clinic    Cc:  NILS Cuevas

## 2022-01-18 NOTE — PATIENT COMMUNICATION
"Juice Davis M.D.  You; Lisa Patel, PharmD 1 hour ago (9:38 AM)       Agree with pharmacy that xarelto lasts until next day. However, the idea is not to leave patient unprotected until day of procedure because of elevated chadsvasc. \"If unwilling to continue doac until day prior to procedure will need bridging with lovenox due to elevated risk cva.\" So if can take xarelto morning of day prior to procedure I agree no need for bridging. Otherwise, lovenox for bridging. Hopefully GI will be ok with doac day prior since endoscopies generally low risk. This would be different for neurosurgical procedures where would need to hold for 5 half lives. In this case definitely would bridge with a readily reversible agent such as lovenox. Would not hold doac 48h without bridging as then patient would be insufficiently protected for 24h. Melissa, can we check with GI to make sure ok to hold doac just 24h? Thanks team!      Called pt and explained that if GI is ok with him holding Xarelto for just 24 hours, then no bridging would be needed. However, if they need him to hold for 48 hours then bridging is needed. His GI doctor is Dr. Conchis Simental with [facility].   Called [facility] at [phone] and s/w April. She will send a message to the nurse asking if pt can hold Xarelto for just 24 hours prior to procedure.       "

## 2022-01-18 NOTE — PATIENT COMMUNICATION
Received call from Ivette with [facility]. She is confused because they don't have any pending procedures for pt. The last clearance request they sent was about a year ago. She will d/w the doctor if pt needs clearance, and if so whether or not Xarelto can be held for only 24 hours. If they do need clearance she will fax a clearance form with a note that the GI doctor is ok with holding for 24 hours.

## 2022-01-20 ENCOUNTER — HOSPITAL ENCOUNTER (OUTPATIENT)
Dept: RADIOLOGY | Facility: MEDICAL CENTER | Age: 77
End: 2022-01-20
Attending: INTERNAL MEDICINE
Payer: MEDICARE

## 2022-01-20 DIAGNOSIS — Z98.890 HISTORY OF CAROTID ENDARTERECTOMY: ICD-10-CM

## 2022-01-20 DIAGNOSIS — I65.23 BILATERAL CAROTID ARTERY STENOSIS: ICD-10-CM

## 2022-01-20 PROCEDURE — 93880 EXTRACRANIAL BILAT STUDY: CPT | Mod: 26 | Performed by: INTERNAL MEDICINE

## 2022-01-20 PROCEDURE — 93880 EXTRACRANIAL BILAT STUDY: CPT

## 2022-02-09 ENCOUNTER — PATIENT MESSAGE (OUTPATIENT)
Dept: CARDIOLOGY | Facility: MEDICAL CENTER | Age: 77
End: 2022-02-09

## 2022-02-14 ENCOUNTER — PATIENT MESSAGE (OUTPATIENT)
Dept: CARDIOLOGY | Facility: MEDICAL CENTER | Age: 77
End: 2022-02-14

## 2022-02-14 NOTE — PATIENT COMMUNICATION
Called patient mobile number and LM for the patient to callback or respond to [employer] message.

## 2022-02-14 NOTE — PATIENT COMMUNICATION
Called [facility] procedure scheduling and LM for them to callback and fax over surgical clearance.

## 2022-02-15 ENCOUNTER — HOSPITAL ENCOUNTER (OUTPATIENT)
Dept: LAB | Facility: MEDICAL CENTER | Age: 77
End: 2022-02-15
Attending: FAMILY MEDICINE
Payer: MEDICARE

## 2022-02-15 LAB
APPEARANCE UR: CLEAR
BILIRUB UR QL STRIP.AUTO: NEGATIVE
COLOR UR: YELLOW
CREAT UR-MCNC: 73.96 MG/DL
EST. AVERAGE GLUCOSE BLD GHB EST-MCNC: 126 MG/DL
GLUCOSE UR STRIP.AUTO-MCNC: NEGATIVE MG/DL
HBA1C MFR BLD: 6 % (ref 4–5.6)
KETONES UR STRIP.AUTO-MCNC: NEGATIVE MG/DL
LEUKOCYTE ESTERASE UR QL STRIP.AUTO: NEGATIVE
MICRO URNS: NORMAL
MICROALBUMIN UR-MCNC: <1.2 MG/DL
MICROALBUMIN/CREAT UR: NORMAL MG/G (ref 0–30)
NITRITE UR QL STRIP.AUTO: NEGATIVE
PH UR STRIP.AUTO: 6 [PH] (ref 5–8)
PROT UR QL STRIP: NEGATIVE MG/DL
RBC UR QL AUTO: NEGATIVE
SP GR UR STRIP.AUTO: 1.01
UROBILINOGEN UR STRIP.AUTO-MCNC: 0.2 MG/DL

## 2022-02-15 PROCEDURE — 36415 COLL VENOUS BLD VENIPUNCTURE: CPT | Mod: GA

## 2022-02-15 PROCEDURE — 83036 HEMOGLOBIN GLYCOSYLATED A1C: CPT | Mod: GA

## 2022-02-15 PROCEDURE — 87086 URINE CULTURE/COLONY COUNT: CPT

## 2022-02-15 PROCEDURE — 82043 UR ALBUMIN QUANTITATIVE: CPT

## 2022-02-15 PROCEDURE — 81003 URINALYSIS AUTO W/O SCOPE: CPT

## 2022-02-15 PROCEDURE — 82570 ASSAY OF URINE CREATININE: CPT

## 2022-02-18 LAB
BACTERIA UR CULT: NORMAL
SIGNIFICANT IND 70042: NORMAL
SITE SITE: NORMAL
SOURCE SOURCE: NORMAL

## 2022-04-15 ENCOUNTER — HOSPITAL ENCOUNTER (OUTPATIENT)
Dept: CARDIOLOGY | Facility: MEDICAL CENTER | Age: 77
End: 2022-04-15
Attending: INTERNAL MEDICINE
Payer: MEDICARE

## 2022-04-15 ENCOUNTER — ANTICOAGULATION VISIT (OUTPATIENT)
Dept: VASCULAR LAB | Facility: MEDICAL CENTER | Age: 77
End: 2022-04-15
Attending: INTERNAL MEDICINE
Payer: MEDICARE

## 2022-04-15 ENCOUNTER — HOSPITAL ENCOUNTER (OUTPATIENT)
Dept: LAB | Facility: MEDICAL CENTER | Age: 77
End: 2022-04-15
Attending: NURSE PRACTITIONER
Payer: MEDICARE

## 2022-04-15 VITALS — DIASTOLIC BLOOD PRESSURE: 59 MMHG | HEART RATE: 58 BPM | SYSTOLIC BLOOD PRESSURE: 138 MMHG

## 2022-04-15 DIAGNOSIS — R94.31 NONSPECIFIC ABNORMAL ELECTROCARDIOGRAM (ECG) (EKG): ICD-10-CM

## 2022-04-15 DIAGNOSIS — I48.91 ATRIAL FIBRILLATION, UNSPECIFIED TYPE (HCC): ICD-10-CM

## 2022-04-15 LAB
ANION GAP SERPL CALC-SCNC: 13 MMOL/L (ref 7–16)
BUN SERPL-MCNC: 12 MG/DL (ref 8–22)
CALCIUM SERPL-MCNC: 9.5 MG/DL (ref 8.5–10.5)
CHLORIDE SERPL-SCNC: 103 MMOL/L (ref 96–112)
CO2 SERPL-SCNC: 22 MMOL/L (ref 20–33)
CREAT SERPL-MCNC: 0.7 MG/DL (ref 0.5–1.4)
EKG IMPRESSION: NORMAL
ERYTHROCYTE [DISTWIDTH] IN BLOOD BY AUTOMATED COUNT: 49.3 FL (ref 35.9–50)
GFR SERPLBLD CREATININE-BSD FMLA CKD-EPI: 95 ML/MIN/1.73 M 2
GLUCOSE SERPL-MCNC: 109 MG/DL (ref 65–99)
HCT VFR BLD AUTO: 47.7 % (ref 42–52)
HGB BLD-MCNC: 15.2 G/DL (ref 14–18)
MCH RBC QN AUTO: 31.8 PG (ref 27–33)
MCHC RBC AUTO-ENTMCNC: 31.9 G/DL (ref 33.7–35.3)
MCV RBC AUTO: 99.8 FL (ref 81.4–97.8)
PLATELET # BLD AUTO: 188 K/UL (ref 164–446)
PMV BLD AUTO: 10.9 FL (ref 9–12.9)
POTASSIUM SERPL-SCNC: 4.5 MMOL/L (ref 3.6–5.5)
RBC # BLD AUTO: 4.78 M/UL (ref 4.7–6.1)
SODIUM SERPL-SCNC: 138 MMOL/L (ref 135–145)
WBC # BLD AUTO: 8.7 K/UL (ref 4.8–10.8)

## 2022-04-15 PROCEDURE — 99212 OFFICE O/P EST SF 10 MIN: CPT

## 2022-04-15 PROCEDURE — 93010 ELECTROCARDIOGRAM REPORT: CPT | Performed by: INTERNAL MEDICINE

## 2022-04-15 PROCEDURE — 85027 COMPLETE CBC AUTOMATED: CPT

## 2022-04-15 PROCEDURE — 80048 BASIC METABOLIC PNL TOTAL CA: CPT

## 2022-04-15 PROCEDURE — 36415 COLL VENOUS BLD VENIPUNCTURE: CPT

## 2022-04-15 PROCEDURE — 93005 ELECTROCARDIOGRAM TRACING: CPT

## 2022-04-15 NOTE — Clinical Note
Nhan Bose, [place] pt with AF is going to have a colonoscopy next month.  Your last note suggested to bridge with enoxaparin when discontinuing the Xarelto.  Could we please forgo the enoxaparin and hold Xarelto per recommendations from the ACC expert consensus (I included a chart in my note).  Typically bridging agents aren't indicated for DOAC's but I understand this might be a special case.    Thanks! Misael

## 2022-04-15 NOTE — PROGRESS NOTES
PCP: Zuleyma Cuevas M.D.  Cardiologist: Juice Davis MD  Dx: AF  CHADSVASC = 6  HAS-BLED = 4  Target End Date = indefinite    Health Status Since Last Assessment   Patient denies any new relevant medical problems, ED visits or hospitalizations   Patient denies any embolic events (stroke/tia/systemic embolism)    Adherence with DOAC Therapy   Pt has not missed any doses in the average week    Bleeding Risk Assessment     Denies Epistaxis   Pt denies any excessive or unusual bleeding/hematomas.  Pt denies any GI bleeds or hematemesis.  Pt denies any concerning daily headache or sub dural hematoma symptoms.     Pt denies any hematuria    ETOH overuse , maximum 2 drinks per day (pt aware of interaction)     Creatinine Clearance/Renal Function     Latest ClCr (pt is getting labs today)    Hepatic function   Latest LFTs (pt to get labs today)   Pt denies any history of liver dysfunction      Drug Interactions   Platelets: 201   ASA/other antiplatelets 81 mg ASA per cardiology. Pt had stent placed 6 years ago.    NSAID none    Verified no anticonvulsant or azole therapy, education provided for future use.     Examination   Blood Pressure WNL, pulse felt atypical.  Aware pt has Afib, but feels closer to a dropped beat. ECG ordered. Left VM for pt to call back and discuss if he would like.  Pt has left bundle branch, but no immediate help is necessary. Request he follows up with cardiology in the future for additional assessment. (not urgent)  Discussed with Dr Ortega.    Symptomatic hypotension none   Significant gait impairment/imbalance/high risk for falls? none    Final Assessment and Recommendations:   Patient appears stable from the anticoagulation standpoint.     Benefits of continued DOAC therapy outweigh risks for this patient   Recommend pt continue with current DOAC therapy Xarelto 20 mg    DOAC is  Affordable  Pt to obtain new BMP to evaluate renal function  He has a colonoscopy in the future (not  scheduled)  Will suggest against enoxaparin given the short half life of Xarelto.  Will reach out to cardiology to discuss.     Per the 2017 ACC Expert Consensus Decision Pathway for Periprocedural Management of Anticoagulation in Patients With Nonvalvular Atrial Fibrillation (see document below) -                 Other Actions: cmp/ cbc hemogram ordered prior to next visit    Follow up:   Will follow up with patient 1  months.      Misael Lozano, PharmD    CC  Dr Juice Davis

## 2022-04-19 ENCOUNTER — TELEPHONE (OUTPATIENT)
Dept: VASCULAR LAB | Facility: MEDICAL CENTER | Age: 77
End: 2022-04-19
Payer: MEDICARE

## 2022-04-19 NOTE — TELEPHONE ENCOUNTER
[facility]    Clarified enoxaparin with cardiology. They have suggested pt may bridge with enoxaparin if pt is at high risk and pt and/or operating physician would prefer enoxaparin during the day when pt is no longer on Xarelto prior to their procedure.     Relayed this information to the pt who will discuss with operating physician if enoxaparin bridge is needed.     Misael Lozano, PharmD

## 2022-04-24 ENCOUNTER — APPOINTMENT (OUTPATIENT)
Dept: RADIOLOGY | Facility: MEDICAL CENTER | Age: 77
End: 2022-04-24
Attending: INTERNAL MEDICINE
Payer: MEDICARE

## 2022-04-24 ENCOUNTER — APPOINTMENT (OUTPATIENT)
Dept: RADIOLOGY | Facility: MEDICAL CENTER | Age: 77
End: 2022-04-24
Attending: EMERGENCY MEDICINE
Payer: MEDICARE

## 2022-04-24 ENCOUNTER — HOSPITAL ENCOUNTER (OUTPATIENT)
Facility: MEDICAL CENTER | Age: 77
End: 2022-04-25
Attending: EMERGENCY MEDICINE | Admitting: INTERNAL MEDICINE
Payer: MEDICARE

## 2022-04-24 DIAGNOSIS — E78.2 MIXED HYPERLIPIDEMIA: ICD-10-CM

## 2022-04-24 DIAGNOSIS — R29.810 FACIAL DROOP: ICD-10-CM

## 2022-04-24 DIAGNOSIS — I48.0 PAROXYSMAL ATRIAL FIBRILLATION (HCC): ICD-10-CM

## 2022-04-24 DIAGNOSIS — G45.9 TIA (TRANSIENT ISCHEMIC ATTACK): ICD-10-CM

## 2022-04-24 DIAGNOSIS — I48.91 ATRIAL FIBRILLATION, UNSPECIFIED TYPE (HCC): ICD-10-CM

## 2022-04-24 LAB
ABO GROUP BLD: NORMAL
ALBUMIN SERPL BCP-MCNC: 4 G/DL (ref 3.2–4.9)
ALBUMIN/GLOB SERPL: 1.4 G/DL
ALP SERPL-CCNC: 90 U/L (ref 30–99)
ALT SERPL-CCNC: 34 U/L (ref 2–50)
ANION GAP SERPL CALC-SCNC: 13 MMOL/L (ref 7–16)
APTT PPP: 38.2 SEC (ref 24.7–36)
AST SERPL-CCNC: 41 U/L (ref 12–45)
BASOPHILS # BLD AUTO: 0.7 % (ref 0–1.8)
BASOPHILS # BLD: 0.05 K/UL (ref 0–0.12)
BILIRUB SERPL-MCNC: 0.4 MG/DL (ref 0.1–1.5)
BLD GP AB SCN SERPL QL: NORMAL
BUN SERPL-MCNC: 17 MG/DL (ref 8–22)
CALCIUM SERPL-MCNC: 8.6 MG/DL (ref 8.5–10.5)
CHLORIDE SERPL-SCNC: 94 MMOL/L (ref 96–112)
CO2 SERPL-SCNC: 21 MMOL/L (ref 20–33)
CREAT SERPL-MCNC: 0.93 MG/DL (ref 0.5–1.4)
EKG IMPRESSION: NORMAL
EOSINOPHIL # BLD AUTO: 0.43 K/UL (ref 0–0.51)
EOSINOPHIL NFR BLD: 6 % (ref 0–6.9)
ERYTHROCYTE [DISTWIDTH] IN BLOOD BY AUTOMATED COUNT: 44.1 FL (ref 35.9–50)
GFR SERPLBLD CREATININE-BSD FMLA CKD-EPI: 84 ML/MIN/1.73 M 2
GLOBULIN SER CALC-MCNC: 2.8 G/DL (ref 1.9–3.5)
GLUCOSE SERPL-MCNC: 102 MG/DL (ref 65–99)
HCT VFR BLD AUTO: 37.1 % (ref 42–52)
HGB BLD-MCNC: 12.9 G/DL (ref 14–18)
IMM GRANULOCYTES # BLD AUTO: 0.02 K/UL (ref 0–0.11)
IMM GRANULOCYTES NFR BLD AUTO: 0.3 % (ref 0–0.9)
INR PPP: 1.53 (ref 0.87–1.13)
LYMPHOCYTES # BLD AUTO: 1.53 K/UL (ref 1–4.8)
LYMPHOCYTES NFR BLD: 21.3 % (ref 22–41)
MCH RBC QN AUTO: 31.9 PG (ref 27–33)
MCHC RBC AUTO-ENTMCNC: 34.8 G/DL (ref 33.7–35.3)
MCV RBC AUTO: 91.8 FL (ref 81.4–97.8)
MONOCYTES # BLD AUTO: 1.3 K/UL (ref 0–0.85)
MONOCYTES NFR BLD AUTO: 18.1 % (ref 0–13.4)
NEUTROPHILS # BLD AUTO: 3.86 K/UL (ref 1.82–7.42)
NEUTROPHILS NFR BLD: 53.6 % (ref 44–72)
NRBC # BLD AUTO: 0 K/UL
NRBC BLD-RTO: 0 /100 WBC
PLATELET # BLD AUTO: 169 K/UL (ref 164–446)
PMV BLD AUTO: 10.2 FL (ref 9–12.9)
POTASSIUM SERPL-SCNC: 3.8 MMOL/L (ref 3.6–5.5)
PROT SERPL-MCNC: 6.8 G/DL (ref 6–8.2)
PROTHROMBIN TIME: 17.9 SEC (ref 12–14.6)
RBC # BLD AUTO: 4.04 M/UL (ref 4.7–6.1)
RH BLD: NORMAL
SODIUM SERPL-SCNC: 128 MMOL/L (ref 135–145)
TROPONIN T SERPL-MCNC: 32 NG/L (ref 6–19)
WBC # BLD AUTO: 7.2 K/UL (ref 4.8–10.8)

## 2022-04-24 PROCEDURE — 99285 EMERGENCY DEPT VISIT HI MDM: CPT

## 2022-04-24 PROCEDURE — 86850 RBC ANTIBODY SCREEN: CPT

## 2022-04-24 PROCEDURE — 84484 ASSAY OF TROPONIN QUANT: CPT

## 2022-04-24 PROCEDURE — 70450 CT HEAD/BRAIN W/O DYE: CPT | Mod: MG

## 2022-04-24 PROCEDURE — 70551 MRI BRAIN STEM W/O DYE: CPT | Mod: ME

## 2022-04-24 PROCEDURE — 85730 THROMBOPLASTIN TIME PARTIAL: CPT

## 2022-04-24 PROCEDURE — 700117 HCHG RX CONTRAST REV CODE 255: Performed by: EMERGENCY MEDICINE

## 2022-04-24 PROCEDURE — 71045 X-RAY EXAM CHEST 1 VIEW: CPT

## 2022-04-24 PROCEDURE — 85025 COMPLETE CBC W/AUTO DIFF WBC: CPT

## 2022-04-24 PROCEDURE — 700102 HCHG RX REV CODE 250 W/ 637 OVERRIDE(OP): Performed by: INTERNAL MEDICINE

## 2022-04-24 PROCEDURE — 93005 ELECTROCARDIOGRAM TRACING: CPT | Performed by: EMERGENCY MEDICINE

## 2022-04-24 PROCEDURE — 36415 COLL VENOUS BLD VENIPUNCTURE: CPT

## 2022-04-24 PROCEDURE — 70498 CT ANGIOGRAPHY NECK: CPT | Mod: MG

## 2022-04-24 PROCEDURE — G0378 HOSPITAL OBSERVATION PER HR: HCPCS

## 2022-04-24 PROCEDURE — 86901 BLOOD TYPING SEROLOGIC RH(D): CPT

## 2022-04-24 PROCEDURE — 70496 CT ANGIOGRAPHY HEAD: CPT | Mod: MG

## 2022-04-24 PROCEDURE — 80053 COMPREHEN METABOLIC PANEL: CPT

## 2022-04-24 PROCEDURE — 94760 N-INVAS EAR/PLS OXIMETRY 1: CPT

## 2022-04-24 PROCEDURE — 86900 BLOOD TYPING SEROLOGIC ABO: CPT

## 2022-04-24 PROCEDURE — 99220 PR INITIAL OBSERVATION CARE,LEVL III: CPT | Performed by: INTERNAL MEDICINE

## 2022-04-24 PROCEDURE — 85610 PROTHROMBIN TIME: CPT

## 2022-04-24 PROCEDURE — 700111 HCHG RX REV CODE 636 W/ 250 OVERRIDE (IP): Performed by: INTERNAL MEDICINE

## 2022-04-24 PROCEDURE — 99215 OFFICE O/P EST HI 40 MIN: CPT | Mod: FS | Performed by: NURSE PRACTITIONER

## 2022-04-24 PROCEDURE — A9270 NON-COVERED ITEM OR SERVICE: HCPCS | Performed by: INTERNAL MEDICINE

## 2022-04-24 PROCEDURE — 96374 THER/PROPH/DIAG INJ IV PUSH: CPT | Mod: XU

## 2022-04-24 RX ORDER — FINASTERIDE 5 MG/1
5 TABLET, FILM COATED ORAL EVERY EVENING
Status: DISCONTINUED | OUTPATIENT
Start: 2022-04-24 | End: 2022-04-25 | Stop reason: HOSPADM

## 2022-04-24 RX ORDER — ASPIRIN 325 MG
325 TABLET ORAL DAILY
Status: DISCONTINUED | OUTPATIENT
Start: 2022-04-25 | End: 2022-04-24

## 2022-04-24 RX ORDER — FINASTERIDE 5 MG/1
5 TABLET, FILM COATED ORAL
COMMUNITY
Start: 2022-04-18

## 2022-04-24 RX ORDER — SULFAMETHOXAZOLE AND TRIMETHOPRIM 800; 160 MG/1; MG/1
TABLET ORAL
Status: ON HOLD | COMMUNITY
Start: 2022-04-18 | End: 2022-04-25

## 2022-04-24 RX ORDER — HYDROXYCHLOROQUINE SULFATE 200 MG/1
200 TABLET, FILM COATED ORAL 2 TIMES DAILY
Status: DISCONTINUED | OUTPATIENT
Start: 2022-04-24 | End: 2022-04-25 | Stop reason: HOSPADM

## 2022-04-24 RX ORDER — HYDROCHLOROTHIAZIDE 25 MG/1
25 TABLET ORAL DAILY
Status: DISCONTINUED | OUTPATIENT
Start: 2022-04-25 | End: 2022-04-25 | Stop reason: HOSPADM

## 2022-04-24 RX ORDER — METOPROLOL SUCCINATE 25 MG/1
50 TABLET, EXTENDED RELEASE ORAL DAILY
Status: DISCONTINUED | OUTPATIENT
Start: 2022-04-25 | End: 2022-04-25 | Stop reason: HOSPADM

## 2022-04-24 RX ORDER — POLYETHYLENE GLYCOL 3350 17 G/17G
1 POWDER, FOR SOLUTION ORAL
Status: DISCONTINUED | OUTPATIENT
Start: 2022-04-24 | End: 2022-04-25 | Stop reason: HOSPADM

## 2022-04-24 RX ORDER — BISACODYL 10 MG
10 SUPPOSITORY, RECTAL RECTAL
Status: DISCONTINUED | OUTPATIENT
Start: 2022-04-24 | End: 2022-04-25 | Stop reason: HOSPADM

## 2022-04-24 RX ORDER — TAMSULOSIN HYDROCHLORIDE 0.4 MG/1
0.4 CAPSULE ORAL EVERY EVENING
Status: DISCONTINUED | OUTPATIENT
Start: 2022-04-24 | End: 2022-04-25 | Stop reason: HOSPADM

## 2022-04-24 RX ORDER — CYCLOBENZAPRINE HCL 5 MG
10 TABLET ORAL 3 TIMES DAILY PRN
Status: DISCONTINUED | OUTPATIENT
Start: 2022-04-24 | End: 2022-04-25 | Stop reason: HOSPADM

## 2022-04-24 RX ORDER — ATORVASTATIN CALCIUM 80 MG/1
80 TABLET, FILM COATED ORAL EVERY EVENING
Status: DISCONTINUED | OUTPATIENT
Start: 2022-04-24 | End: 2022-04-25 | Stop reason: HOSPADM

## 2022-04-24 RX ORDER — LORAZEPAM 1 MG/1
0.5 TABLET ORAL 3 TIMES DAILY PRN
Status: DISCONTINUED | OUTPATIENT
Start: 2022-04-24 | End: 2022-04-25 | Stop reason: HOSPADM

## 2022-04-24 RX ORDER — TRAZODONE HYDROCHLORIDE 50 MG/1
100 TABLET ORAL NIGHTLY
Status: DISCONTINUED | OUTPATIENT
Start: 2022-04-24 | End: 2022-04-25 | Stop reason: HOSPADM

## 2022-04-24 RX ORDER — AMOXICILLIN 250 MG
2 CAPSULE ORAL 2 TIMES DAILY
Status: DISCONTINUED | OUTPATIENT
Start: 2022-04-25 | End: 2022-04-25 | Stop reason: HOSPADM

## 2022-04-24 RX ORDER — ASPIRIN 300 MG/1
300 SUPPOSITORY RECTAL DAILY
Status: DISCONTINUED | OUTPATIENT
Start: 2022-04-25 | End: 2022-04-24

## 2022-04-24 RX ORDER — LORAZEPAM 0.5 MG/1
0.5 TABLET ORAL 3 TIMES DAILY PRN
Status: SHIPPED | COMMUNITY
End: 2024-01-04

## 2022-04-24 RX ORDER — LISINOPRIL 20 MG/1
20 TABLET ORAL DAILY
Status: DISCONTINUED | OUTPATIENT
Start: 2022-04-25 | End: 2022-04-25 | Stop reason: HOSPADM

## 2022-04-24 RX ORDER — LORATADINE 10 MG/1
10 TABLET ORAL DAILY
COMMUNITY

## 2022-04-24 RX ORDER — ASPIRIN 81 MG/1
324 TABLET, CHEWABLE ORAL DAILY
Status: DISCONTINUED | OUTPATIENT
Start: 2022-04-25 | End: 2022-04-24

## 2022-04-24 RX ORDER — LORAZEPAM 2 MG/ML
1 INJECTION INTRAMUSCULAR ONCE
Status: COMPLETED | OUTPATIENT
Start: 2022-04-24 | End: 2022-04-24

## 2022-04-24 RX ORDER — OMEPRAZOLE 20 MG/1
20 CAPSULE, DELAYED RELEASE ORAL DAILY
Status: DISCONTINUED | OUTPATIENT
Start: 2022-04-25 | End: 2022-04-25 | Stop reason: HOSPADM

## 2022-04-24 RX ADMIN — TAMSULOSIN HYDROCHLORIDE 0.4 MG: 0.4 CAPSULE ORAL at 21:56

## 2022-04-24 RX ADMIN — FINASTERIDE 5 MG: 5 TABLET, FILM COATED ORAL at 21:56

## 2022-04-24 RX ADMIN — HYDROXYCHLOROQUINE SULFATE 200 MG: 200 TABLET ORAL at 22:11

## 2022-04-24 RX ADMIN — IOHEXOL 80 ML: 350 INJECTION, SOLUTION INTRAVENOUS at 17:17

## 2022-04-24 RX ADMIN — ATORVASTATIN CALCIUM 80 MG: 80 TABLET, FILM COATED ORAL at 21:56

## 2022-04-24 RX ADMIN — RIVAROXABAN 20 MG: 20 TABLET, FILM COATED ORAL at 21:56

## 2022-04-24 RX ADMIN — LORAZEPAM 1 MG: 2 INJECTION INTRAMUSCULAR; INTRAVENOUS at 19:25

## 2022-04-24 RX ADMIN — TRAZODONE HYDROCHLORIDE 100 MG: 50 TABLET ORAL at 21:56

## 2022-04-24 ASSESSMENT — FIBROSIS 4 INDEX
FIB4 SCORE: 3.2
FIB4 SCORE: 2.65
FIB4 SCORE: 2.65

## 2022-04-24 ASSESSMENT — CHA2DS2 SCORE
SEX: MALE
CHA2DS2 VASC SCORE: 7
PRIOR STROKE OR TIA OR THROMBOEMBOLISM: YES
CHF OR LEFT VENTRICULAR DYSFUNCTION: YES
HYPERTENSION: YES
DIABETES: NO
VASCULAR DISEASE: YES
AGE 75 OR GREATER: YES
AGE 65 TO 74: NO

## 2022-04-24 ASSESSMENT — ENCOUNTER SYMPTOMS
DIARRHEA: 0
DEPRESSION: 0
ABDOMINAL PAIN: 0
STRIDOR: 0
CLAUDICATION: 0
NECK PAIN: 0
WEAKNESS: 0
EYE DISCHARGE: 0
FEVER: 0
COUGH: 0
WHEEZING: 0
HEARTBURN: 0
CHILLS: 0
NERVOUS/ANXIOUS: 0
NAUSEA: 0
HEADACHES: 0
WEIGHT LOSS: 0
FOCAL WEAKNESS: 1
PALPITATIONS: 0
MYALGIAS: 0
EYE REDNESS: 0
SORE THROAT: 0
SENSORY CHANGE: 0
INSOMNIA: 0
SINUS PAIN: 0
BLURRED VISION: 0
SEIZURES: 0
BACK PAIN: 0
CONSTIPATION: 0
EYE PAIN: 0
VOMITING: 0
TINGLING: 0
SHORTNESS OF BREATH: 0
SPUTUM PRODUCTION: 0
BLOOD IN STOOL: 0
DIZZINESS: 0
SPEECH CHANGE: 1
ORTHOPNEA: 0

## 2022-04-24 ASSESSMENT — LIFESTYLE VARIABLES
TOTAL SCORE: 0
ON A TYPICAL DAY WHEN YOU DRINK ALCOHOL HOW MANY DRINKS DO YOU HAVE: 2
AVERAGE NUMBER OF DAYS PER WEEK YOU HAVE A DRINK CONTAINING ALCOHOL: 7
TOTAL SCORE: 0
ALCOHOL_USE: YES
EVER FELT BAD OR GUILTY ABOUT YOUR DRINKING: NO
HOW MANY TIMES IN THE PAST YEAR HAVE YOU HAD 5 OR MORE DRINKS IN A DAY: 0
CONSUMPTION TOTAL: NEGATIVE
HAVE PEOPLE ANNOYED YOU BY CRITICIZING YOUR DRINKING: NO
EVER HAD A DRINK FIRST THING IN THE MORNING TO STEADY YOUR NERVES TO GET RID OF A HANGOVER: NO
DOES PATIENT WANT TO STOP DRINKING: NO
HAVE YOU EVER FELT YOU SHOULD CUT DOWN ON YOUR DRINKING: NO
TOTAL SCORE: 0

## 2022-04-24 ASSESSMENT — PATIENT HEALTH QUESTIONNAIRE - PHQ9
1. LITTLE INTEREST OR PLEASURE IN DOING THINGS: NOT AT ALL
2. FEELING DOWN, DEPRESSED, IRRITABLE, OR HOPELESS: NOT AT ALL
SUM OF ALL RESPONSES TO PHQ9 QUESTIONS 1 AND 2: 0

## 2022-04-24 NOTE — ED PROVIDER NOTES
ED Provider Note    Scribed for Raffi Lechuga M.D. by Renetta Wyatt. 4/24/2022, 4:59 PM.    Primary care provider: Zuleyma Cuevas M.D.  Means of arrival: EMS  History obtained from: Patient, EMS  History limited by: None    CHIEF COMPLAINT  Chief Complaint   Patient presents with   • Possible Stroke       HPI  Carl Blount is a 77 y.o. 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 Onset was 4:15 PM today. EMS states the patient was not able to recognize common objects such as a pen, or watch on scene. Patient reports his symptoms lasted approximately 20 minutes. His symptoms has since resolved upon arrival to the ED. He adds he experienced similar symptoms 2 days ago which lasted approximately 15-20 minutes before resolving on its own. He denies any associated shortness of breath, chest pain, extremity numbness, tingling, weakness, blurred vision, fever, or chills. No alleviating or exacerbating factors were identified. He denies any medication allergies. Patient is on Xarelto for his history of atrial fibrillation. His last dose was last night.     REVIEW OF SYSTEMS  Review of Systems   Constitutional: Negative for chills and fever.   Eyes: Negative for blurred vision.   Respiratory: Negative for shortness of breath.    Cardiovascular: Negative for chest pain.   Neurological: Positive for speech change. Negative for tingling, sensory change and weakness.        Left sided facial droop   All other systems reviewed and are negative.      PAST MEDICAL HISTORY   has a past medical history of Afib (HCC), Hyperlipidemia, and Hypertension.    SURGICAL HISTORY  patient denies any surgical history    SOCIAL HISTORY  Social History     Tobacco Use   • Smoking status: Former Smoker   • Smokeless tobacco: Never Used   Vaping Use   • Vaping Use: Never used   Substance Use Topics   • Alcohol use: Yes      "Comment: 2 drinks/night   • Drug use: Never      Social History     Substance and Sexual Activity   Drug Use Never       FAMILY HISTORY  History reviewed. No pertinent family history.    CURRENT MEDICATIONS  Home Medications     Reviewed by Jose Parisi (Pharmacy Tech) on 04/24/22 at 1722  Med List Status: Complete   Medication Last Dose Status   atorvastatin (LIPITOR) 80 MG tablet UNK Active   cyanocobalamin (VITAMIN B12) 1000 MCG Tab UNK Active   cyclobenzaprine (FLEXERIL) 10 MG Tab UNK Active   finasteride (PROSCAR) 5 MG Tab UNK Active   hydroCHLOROthiazide (HYDRODIURIL) 25 MG Tab UNK Active   hydroxychloroquine (PLAQUENIL) 200 MG Tab UNK Active   lidocaine (LIDODERM) 5 % Patch UNK Active   lisinopril (PRINIVIL) 20 MG Tab UNK Active   loratadine (CLARITIN) 10 MG Tab UNK Active   LORazepam (ATIVAN) 0.5 MG Tab UNK Active   metoprolol SR (TOPROL XL) 50 MG TABLET SR 24 HR UNK Active   Omega-3 Fatty Acids (FISH OIL) 1000 MG Cap capsule UNK Active   pantoprazole (PROTONIX) 40 MG Tablet Delayed Response UNK Active   rivaroxaban (XARELTO) 20 MG Tab tablet UNK Active   sulfamethoxazole-trimethoprim (BACTRIM DS) 800-160 MG tablet UNK Active   tamsulosin (FLOMAX) 0.4 MG capsule UNK Active   therapeutic multivitamin-minerals (THERAGRAN-M) Tab UNK Active   traZODone (DESYREL) 100 MG Tab UNK Active                ALLERGIES  Allergies   Allergen Reactions   • Amlodipine Unspecified     Gingival hyperplasia  08/09/2021 per pt no allergies       PHYSICAL EXAM  VITAL SIGNS: BP (!) 186/74   Pulse 80   Temp 36.3 °C (97.4 °F) (Temporal)   Resp 20   Ht 1.854 m (6' 1\")   Wt 104 kg (230 lb)   SpO2 97%   BMI 30.34 kg/m²   Vitals reviewed.  Constitutional: Awake alert nontoxic no acute distress  HENT: Normocephalic, Atraumatic, Bilateral external ears normal, Oropharynx moist, No oral exudates, Nose normal.   Eyes: PERRL, EOMI, Conjunctiva normal, No discharge.   Neck: Normal range of motion, No tenderness, Supple, No " stridor.   Cardiovascular: Normal heart rate, Normal rhythm, No murmurs, No rubs, No gallops.   Thorax & Lungs: Normal breath sounds, No respiratory distress, No wheezing, No chest tenderness.   Abdomen: Bowel sounds normal, Soft, No tenderness  Skin: Warm, Dry, No erythema, No rash.   Back: No tenderness, No CVA tenderness.   Musculoskeletal: Good range of motion in all major joints. No edema.  Good pulses  Neurologic: Alert, cranial nerves II through XII seem intact facial muscles move symmetrically.  No focal weakness.  Normal sensation.  Psychiatric: Affect normal    LABS  Results for orders placed or performed during the hospital encounter of 04/24/22   CBC WITH DIFFERENTIAL   Result Value Ref Range    WBC 7.2 4.8 - 10.8 K/uL    RBC 4.04 (L) 4.70 - 6.10 M/uL    Hemoglobin 12.9 (L) 14.0 - 18.0 g/dL    Hematocrit 37.1 (L) 42.0 - 52.0 %    MCV 91.8 81.4 - 97.8 fL    MCH 31.9 27.0 - 33.0 pg    MCHC 34.8 33.7 - 35.3 g/dL    RDW 44.1 35.9 - 50.0 fL    Platelet Count 169 164 - 446 K/uL    MPV 10.2 9.0 - 12.9 fL    Neutrophils-Polys 53.60 44.00 - 72.00 %    Lymphocytes 21.30 (L) 22.00 - 41.00 %    Monocytes 18.10 (H) 0.00 - 13.40 %    Eosinophils 6.00 0.00 - 6.90 %    Basophils 0.70 0.00 - 1.80 %    Immature Granulocytes 0.30 0.00 - 0.90 %    Nucleated RBC 0.00 /100 WBC    Neutrophils (Absolute) 3.86 1.82 - 7.42 K/uL    Lymphs (Absolute) 1.53 1.00 - 4.80 K/uL    Monos (Absolute) 1.30 (H) 0.00 - 0.85 K/uL    Eos (Absolute) 0.43 0.00 - 0.51 K/uL    Baso (Absolute) 0.05 0.00 - 0.12 K/uL    Immature Granulocytes (abs) 0.02 0.00 - 0.11 K/uL    NRBC (Absolute) 0.00 K/uL   COMP METABOLIC PANEL   Result Value Ref Range    Sodium 128 (L) 135 - 145 mmol/L    Potassium 3.8 3.6 - 5.5 mmol/L    Chloride 94 (L) 96 - 112 mmol/L    Co2 21 20 - 33 mmol/L    Anion Gap 13.0 7.0 - 16.0    Glucose 102 (H) 65 - 99 mg/dL    Bun 17 8 - 22 mg/dL    Creatinine 0.93 0.50 - 1.40 mg/dL    Calcium 8.6 8.5 - 10.5 mg/dL    AST(SGOT) 41 12 - 45 U/L     ALT(SGPT) 34 2 - 50 U/L    Alkaline Phosphatase 90 30 - 99 U/L    Total Bilirubin 0.4 0.1 - 1.5 mg/dL    Albumin 4.0 3.2 - 4.9 g/dL    Total Protein 6.8 6.0 - 8.2 g/dL    Globulin 2.8 1.9 - 3.5 g/dL    A-G Ratio 1.4 g/dL   PROTHROMBIN TIME   Result Value Ref Range    PT 17.9 (H) 12.0 - 14.6 sec    INR 1.53 (H) 0.87 - 1.13   APTT   Result Value Ref Range    APTT 38.2 (H) 24.7 - 36.0 sec   COD (ADULT)   Result Value Ref Range    ABO Grouping Only A     Rh Grouping Only POS     Antibody Screen-Cod NEG    TROPONIN   Result Value Ref Range    Troponin T 32 (H) 6 - 19 ng/L   ESTIMATED GFR   Result Value Ref Range    GFR (CKD-EPI) 84 >60 mL/min/1.73 m 2   EKG (NOW)   Result Value Ref Range    Report       [facility] Emergency Dept.    Test Date:  2022  Pt Name:    SHANDRA JOYNER               Department: ER  MRN:        [id number]                      Room:  Gender:     Male                         Technician: [id number]  :        1945                   Requested By:YOUSUF WRIGHT  Order #:    [id number]                    Reading MD: YOUSUF WRIGHT. SEDRICK    Measurements  Intervals                                Axis  Rate:       86                           P:          14  WA:         144                          QRS:        -54  QRSD:       134                          T:          99  QT:         424  QTc:        508    Interpretive Statements  SINUS RHYTHM  PAIRED VENTRICULAR PREMATURE COMPLEXES  NONSPECIFIC IVCD WITH LAD  ABNRM R PROG, CONSIDER ASMI OR LEAD PLACEMENT  Compared to ECG 04/15/2022 11:09:34  Intraventricular conduction delay now present  Myocardial infarct finding now present  Left bundle-branch block no longer present  Electronically Signed O n 2022 19:10:20 PDT by YOUSUF WRIGHT. AMD       All labs reviewed by me.    EKG Interpretation  Interpreted by me    RADIOLOGY  DX-CHEST-PORTABLE (1 VIEW)   Final Result      No evidence of acute cardiopulmonary process.      CT-CTA  NECK WITH & W/O-POST PROCESSING   Final Result      1.  Moderate atherosclerotic narrowing of the RIGHT carotid bulb/proximal internal carotid artery and proximal LEFT vertebral artery.   2.  No dissection or occlusion of the cervical carotid or vertebral arteries.   3.  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      CT-CTA HEAD WITH & W/O-POST PROCESS   Final Result      No intracranial aneurysm, focal high-grade stenosis, or abrupt large vessel cut off.      CT-HEAD W/O   Final Result      1.  No acute intracranial abnormality.   2.  Chronic LEFT MCA distribution infarct.   3.  Diffuse atrophy.         EC-ECHOCARDIOGRAM COMPLETE W/O CONT    (Results Pending)   MR-BRAIN-W/O    (Results Pending)     The radiologist's interpretation of all radiological studies have been reviewed by me.    COURSE & MEDICAL DECISION MAKING  Pertinent Labs & Imaging studies reviewed. (See chart for details)    4:59 PM Patient seen and examined at bedside. The patient presents with aphasia and left sided facial droop, and the differential diagnosis includes but is not limited to TIA versus stroke. Ordered for EKG, ABO Rh, Troponin, COD, APTT, Prothrombin time, CMP, CBC with diff, CT CTA Neck, CT CTA head, DX Chest, and CT Head without contrast to evaluate. Discussed plan of care with patient. I informed them that labs and imaging will be ordered to evaluate symptoms. Patient is understanding and agreeable with plan.        The patient was a code stroke he was seen by the neurology team upon arrival to the charge desk.  He is made a code stroke despite symptoms resolved.  This was history of A. fib.  NIH is currently 0 therefore he does not need perfusion.    Because his symptoms have resolved and he is on Xarelto he is not a candidate for alteplase it would be a contraindication.    CTs were discussed and plan was discussed with neurology and recommend hospitalization echocardiogram and MRI to work-up.  The patient is agreeable to  plan.      6:06 PM - Paged hospitalist.     6:12 PM - I discussed the patient's case and the above findings with Dr. Trevino (Hospitalist) who will evaluate the patient for hospitalization.     6:19 PM - Patient was reevaluated at bedside. He is resting in bed at this time with no new complaints. His wife is now at his bedside. Discussed lab and radiology results with the patient as outlined above. I informed them that there was evidence for a TIA. They are understanding and agreeable to hospitalization.     DISPOSITION:  Patient will be hospitalized by Dr. Trevino in guarded condition.    FINAL IMPRESSION  1. TIA (transient ischemic attack)    2. Atrial fibrillation, unspecified type (HCC)        I, Renetta Wyatt (Scribe), am scribing for, and in the presence of, Raffi Lechuga M.D..    Electronically signed by: Renetta Wyatt (Scribe), 4/24/2022    IRaffi M.D. personally performed the services described in this documentation, as scribed by Renetta Wyatt in my presence, and it is both accurate and complete.    C    The note accurately reflects work and decisions made by me.  Raffi Lechuga M.D.  4/24/2022  6:35 PM

## 2022-04-25 ENCOUNTER — APPOINTMENT (OUTPATIENT)
Dept: CARDIOLOGY | Facility: MEDICAL CENTER | Age: 77
End: 2022-04-25
Attending: INTERNAL MEDICINE
Payer: MEDICARE

## 2022-04-25 VITALS
HEIGHT: 73 IN | SYSTOLIC BLOOD PRESSURE: 125 MMHG | OXYGEN SATURATION: 96 % | RESPIRATION RATE: 18 BRPM | TEMPERATURE: 96.4 F | BODY MASS INDEX: 30.59 KG/M2 | HEART RATE: 68 BPM | WEIGHT: 230.82 LBS | DIASTOLIC BLOOD PRESSURE: 60 MMHG

## 2022-04-25 LAB
ABO + RH BLD: NORMAL
ALBUMIN SERPL BCP-MCNC: 3.5 G/DL (ref 3.2–4.9)
ALBUMIN/GLOB SERPL: 1.3 G/DL
ALP SERPL-CCNC: 85 U/L (ref 30–99)
ALT SERPL-CCNC: 31 U/L (ref 2–50)
ANION GAP SERPL CALC-SCNC: 11 MMOL/L (ref 7–16)
AST SERPL-CCNC: 38 U/L (ref 12–45)
BILIRUB SERPL-MCNC: 0.4 MG/DL (ref 0.1–1.5)
BUN SERPL-MCNC: 13 MG/DL (ref 8–22)
CALCIUM SERPL-MCNC: 8.6 MG/DL (ref 8.5–10.5)
CHLORIDE SERPL-SCNC: 99 MMOL/L (ref 96–112)
CHOLEST SERPL-MCNC: 98 MG/DL (ref 100–199)
CO2 SERPL-SCNC: 22 MMOL/L (ref 20–33)
CREAT SERPL-MCNC: 0.72 MG/DL (ref 0.5–1.4)
ERYTHROCYTE [DISTWIDTH] IN BLOOD BY AUTOMATED COUNT: 45 FL (ref 35.9–50)
EST. AVERAGE GLUCOSE BLD GHB EST-MCNC: 120 MG/DL
GFR SERPLBLD CREATININE-BSD FMLA CKD-EPI: 94 ML/MIN/1.73 M 2
GLOBULIN SER CALC-MCNC: 2.7 G/DL (ref 1.9–3.5)
GLUCOSE SERPL-MCNC: 106 MG/DL (ref 65–99)
HBA1C MFR BLD: 5.8 % (ref 4–5.6)
HCT VFR BLD AUTO: 40.6 % (ref 42–52)
HDLC SERPL-MCNC: 59 MG/DL
HGB BLD-MCNC: 13.7 G/DL (ref 14–18)
LDLC SERPL CALC-MCNC: 30 MG/DL
LV EJECT FRACT  99904: 20
LV EJECT FRACT MOD 2C 99903: 43.29
LV EJECT FRACT MOD 4C 99902: 25.22
LV EJECT FRACT MOD BP 99901: 34.32
MCH RBC QN AUTO: 31.8 PG (ref 27–33)
MCHC RBC AUTO-ENTMCNC: 33.7 G/DL (ref 33.7–35.3)
MCV RBC AUTO: 94.2 FL (ref 81.4–97.8)
PLATELET # BLD AUTO: 176 K/UL (ref 164–446)
PMV BLD AUTO: 10.3 FL (ref 9–12.9)
POTASSIUM SERPL-SCNC: 3.6 MMOL/L (ref 3.6–5.5)
PROT SERPL-MCNC: 6.2 G/DL (ref 6–8.2)
RBC # BLD AUTO: 4.31 M/UL (ref 4.7–6.1)
SODIUM SERPL-SCNC: 132 MMOL/L (ref 135–145)
TRIGL SERPL-MCNC: 44 MG/DL (ref 0–149)
WBC # BLD AUTO: 7.7 K/UL (ref 4.8–10.8)

## 2022-04-25 PROCEDURE — A9270 NON-COVERED ITEM OR SERVICE: HCPCS | Performed by: INTERNAL MEDICINE

## 2022-04-25 PROCEDURE — G0378 HOSPITAL OBSERVATION PER HR: HCPCS

## 2022-04-25 PROCEDURE — 80053 COMPREHEN METABOLIC PANEL: CPT

## 2022-04-25 PROCEDURE — 80061 LIPID PANEL: CPT

## 2022-04-25 PROCEDURE — 99214 OFFICE O/P EST MOD 30 MIN: CPT | Performed by: PSYCHIATRY & NEUROLOGY

## 2022-04-25 PROCEDURE — 85027 COMPLETE CBC AUTOMATED: CPT

## 2022-04-25 PROCEDURE — 83036 HEMOGLOBIN GLYCOSYLATED A1C: CPT

## 2022-04-25 PROCEDURE — 93306 TTE W/DOPPLER COMPLETE: CPT | Mod: 26 | Performed by: INTERNAL MEDICINE

## 2022-04-25 PROCEDURE — 99217 PR OBSERVATION CARE DISCHARGE: CPT | Mod: FS | Performed by: INTERNAL MEDICINE

## 2022-04-25 PROCEDURE — 93306 TTE W/DOPPLER COMPLETE: CPT

## 2022-04-25 PROCEDURE — 700102 HCHG RX REV CODE 250 W/ 637 OVERRIDE(OP): Performed by: INTERNAL MEDICINE

## 2022-04-25 RX ORDER — ATORVASTATIN CALCIUM 80 MG/1
40 TABLET, FILM COATED ORAL
Qty: 90 TABLET | Refills: 3 | Status: SHIPPED | OUTPATIENT
Start: 2022-04-25 | End: 2023-03-24 | Stop reason: SDUPTHER

## 2022-04-25 RX ADMIN — HYDROXYCHLOROQUINE SULFATE 200 MG: 200 TABLET ORAL at 05:26

## 2022-04-25 RX ADMIN — METOPROLOL SUCCINATE 50 MG: 25 TABLET, EXTENDED RELEASE ORAL at 05:27

## 2022-04-25 RX ADMIN — LISINOPRIL 20 MG: 20 TABLET ORAL at 05:27

## 2022-04-25 RX ADMIN — HYDROCHLOROTHIAZIDE 25 MG: 25 TABLET ORAL at 05:27

## 2022-04-25 RX ADMIN — OMEPRAZOLE 20 MG: 20 CAPSULE, DELAYED RELEASE ORAL at 05:27

## 2022-04-25 NOTE — THERAPY
Missed Therapy     Patient Name: Carl Blount  Age:  77 y.o., Sex:  male  Medical Record #: [id number]  Today's Date: 4/25/2022    Discussed missed therapy with RN       04/25/22 0946   Interdisciplinary Plan of Care Collaboration   Collaboration Comments PT eval order received and attempted. Per RN no need as deficits resolved and patient is up self. Order to be completed

## 2022-04-25 NOTE — ASSESSMENT & PLAN NOTE
Symptoms have resolved  Not a tPA candidate  History of CVA  CT scan of the head and neck as above  MRI pending  Echo pending  Neuro following  Per neuro permissive hypertension is not indicated  Check lipid panel  PT OT and speech

## 2022-04-25 NOTE — HOSPITAL COURSE
Carl Blount is a 77 y.o. male with past medical history of atrial fibrillation, essential hypertension, dyslipidemia, remote history of CVA, left internal carotid artery stenosis with CEA who presented 4/24/2022 with left facial droop and difficulty with word finding started around 4:15 PM this afternoon.  It lasted for about 30 minutes and went away on its own.  The patient has similar episode this past Friday where he has difficulty with word finding and lasted for about 15-minute.  He denies any neurological deficit over his extremity.  His symptoms have now resolved.  He is not a tPA candidate.  In the ER because of his symptom code stroke was called and neurology was consulted.  CT scan of the head and CT angiogram head and neck and showed no acute finding.  He was admitted for to CDU floor for observation.  MRI was negative for any acute process.  Neurology signed off and recommended changing Xarelto to Eliquis and decreasing Lipitor dose to 40 mg daily.  Lipid panel is within defined limits, hemoglobin A1c is 5.8.  Patient will be discharged with appropriate medication changes.

## 2022-04-25 NOTE — PROGRESS NOTES
Monitor Summary    SR 76-80  With PVC, with couplets, with PAC, with triplets  .18/.10/.34

## 2022-04-25 NOTE — ED TRIAGE NOTES
1654 - BIB [facility], stroke activation.  Pt reports an onset of expressive aphasia, L sided facial droop and dizziness at 1615.  Symptoms resolved on patients arrival to the ER.  Pt also reports similar symptoms 2 days ago lasting approx 15 min.  Hx of CVA 10 years ago, Afib, pt takes Xarelto.  Pt to CT w/ RN.  Pt then to R8, report to Drea DEAN.

## 2022-04-25 NOTE — ED NOTES
Report from kenzie RN's, Alka and Leonor    Awaiting ativan approval and to MRI. Pt will then be moved to CDU after MRI  Report already given, orders to come off tele to be moved from MRI to CDU

## 2022-04-25 NOTE — DISCHARGE PLANNING
[facility] Transitional Care Coordination    Referral from:  Dr. Trevino    Insurance Provider on Facesheet: LIVIA/HADLEYP    Potential Rehab Diagnosis: TBD    Chart review indicates patient may have on going medical management and may have therapy needs to possibly meet inpatient rehab facility criteria with the goal of returning to community.    D/C support: TBD     Physiatry consultation forwarded per protocol.     TX pending.      Thank you for the referral.

## 2022-04-25 NOTE — THERAPY
Missed Therapy     Patient Name: Carl Blount  Age:  77 y.o., Sex:  male  Medical Record #: [id number]  Today's Date: 4/25/2022    Discussed missed therapy with RN and APRN       04/25/22 0858   Treatment Variance   Reason For Missed Therapy Medical - Other (Please Comment)  (MRI negative; not warranted)   Total Time Spent   Total Time Spent (Mins) 5   Initial Contact Note    Initial Contact Note  Order Received and Verified. Speech Therapy Evaluation NOT Completed Because Patient Does Not Require Acute Speech Therapy at this Time.   Interdisciplinary Plan of Care Collaboration   IDT Collaboration with  Nursing;Nurse Practitioner   Collaboration Comments Clinical swallow and cognitive evaluation orders received. MRI negative for acute CVA and per RN, pt tolerating diet and evals do not appear warranted. Per APRN Abdullahi, ok to cancel orders for both evaluations. Please reconsult SLP if needed. Thank you.

## 2022-04-25 NOTE — ED NOTES
Pt medicated per MAR for MRI  MRI at BS and wheeled pt to MRI w/ chart in stable condition      Notified CDU RN Lilliam of status and wife to come up just to get pt settled; wife is aware of policies and visiting hours  Tech escorted wife to CDU room    Pt to be moved to CDU after MRI completed

## 2022-04-25 NOTE — PROGRESS NOTES
Neurology Progress Note  Neurohospitalist Service, [facility]    Referring Physician: JUSTEN Bell*      Interval History: No acute events overnight.  Admitted for transient L face droop and aphasia.  MRI brain completed and is negative of ischemic stroke or other acute pathology.  Remains at neurologic baseline.    Review of systems: In addition to what is detailed in the HPI and/or updated in the interval history, all other systems reviewed and are negative.    Past Medical History, Past Surgical History and Social History reviewed and unchanged from prior    Medications:    Current Facility-Administered Medications:   •  atorvastatin (LIPITOR) tablet 80 mg, 80 mg, Oral, Q EVENING, Rito Trevino M.D., 80 mg at 04/24/22 2156  •  cyclobenzaprine (Flexeril) tablet 10 mg, 10 mg, Oral, TID PRN, Rito Trevino M.D.  •  finasteride (PROSCAR) tablet 5 mg, 5 mg, Oral, Q EVENING, Rito Trevino M.D., 5 mg at 04/24/22 2156  •  hydroxychloroquine (Plaquenil) tablet 200 mg, 200 mg, Oral, BID, Rito Trevino M.D., 200 mg at 04/25/22 0526  •  LORazepam (ATIVAN) tablet 0.5 mg, 0.5 mg, Oral, TID PRN, Rito Trevino M.D.  •  omeprazole (PRILOSEC) capsule 20 mg, 20 mg, Oral, DAILY, Rito Trevino M.D., 20 mg at 04/25/22 0527  •  rivaroxaban (XARELTO) tablet 20 mg, 20 mg, Oral, PM MEAL, Rito Trevino M.D., 20 mg at 04/24/22 2156  •  tamsulosin (FLOMAX) capsule 0.4 mg, 0.4 mg, Oral, Q EVENING, Rito Trevino M.D., 0.4 mg at 04/24/22 2156  •  traZODone (DESYREL) tablet 100 mg, 100 mg, Oral, Nightly, Rito Trevino M.D., 100 mg at 04/24/22 2156  •  senna-docusate (PERICOLACE or SENOKOT S) 8.6-50 MG per tablet 2 Tablet, 2 Tablet, Oral, BID **AND** polyethylene glycol/lytes (MIRALAX) PACKET 1 Packet, 1 Packet, Oral, QDAY PRN **AND** magnesium hydroxide (MILK OF MAGNESIA) suspension 30 mL, 30 mL, Oral, QDAY PRN **AND** bisacodyl (DULCOLAX) suppository 10 mg, 10 mg, Rectal, QDAY PRN, Rito Trevino M.D.  •  hydroCHLOROthiazide  "(HYDRODIURIL) tablet 25 mg, 25 mg, Oral, DAILY, Rito Trevino M.D., 25 mg at 04/25/22 0527  •  lisinopril (PRINIVIL) tablet 20 mg, 20 mg, Oral, DAILY, Rito Trevino M.D., 20 mg at 04/25/22 0527  •  metoprolol SR (TOPROL XL) tablet 50 mg, 50 mg, Oral, DAILY, Rito Trevino M.D., 50 mg at 04/25/22 0527    Physical Examination:   /76   Pulse 60   Temp 35.9 °C (96.7 °F) (Temporal)   Resp 18   Ht 1.854 m (6' 1\")   Wt 105 kg (230 lb 13.2 oz)   SpO2 96%   BMI 30.45 kg/m²       General: Patient is awake and in no acute distress  Neck: There is normal range of motion  CV: Irregular  Extremities:  Warm, dry, and intact, without peripheral lower extremity edema    NEUROLOGICAL EXAM:     Mental status: Awake, alert and fully oriented, follows commands  Speech and language: Speech is clear and fluent. The patient is able to name and repeat.  Cranial nerve exam: Pupils are equal, round and reactive to light bilaterally. Visual fields are full. There is no nystagmus. Extraocular muscles are intact. Face is symmetric  Motor exam: There is sustained antigravity with no downward drift in bilateral arms and legs.  There is no pronator drift .  Tone is normal. No abnormal movements were seen on exam.  Sensory exam:  Reacts to tactile in all 4 extremities, there is no neglect to double stim  Coordination: No ataxia on bilateral FTN testing      NIHSS: National Institutes of Health Stroke Scale    [0] 1a:Level of Consciousness    0-alert 1-drowsy   2-stupor   3-coma  [0] 1b:LOC Questions                  0-both  1-one      2-neither  [0] 1c:LOC Commands                   0-both  1-one      2-neither  [0] 2: Best Gaze                     0-nl    1-partial  2-forced  [0] 3: Visual Fields                   0-nl    1-partial  2-complete 3-bilat  [0] 4: Facial Paresis                0-nl    1-minor    2-partial  3-full  MOTOR                       0-nl  [0] 5: Right Arm           1-drift  [0] 6: Left Arm             2-some effort vs " gravity  [0] 7: Right Leg           3-no effort vs gravity  [0] 8: Left Leg             4-no movement                             x-untestable  [0] 9: Limb Ataxia                    0-abs   1-1_limb   2-2+_limbs       x-untestable  [0] 10:Sensory                        0-nl    1-partial  2-dense  [0] 11:Best Language/Aphasia         0-nl    1-mild/mod 2-severe   3-mute  [0] 12:Dysarthria                     0-nl    1-mild/mod 2-severe       x-untestable  [0] 13:Neglect/Inattention            0-none  1-partial  2-complete  [0] TOTAL      Objective Data:    Labs:  Lab Results   Component Value Date/Time    PROTHROMBTM 17.9 (H) 04/24/2022 05:31 PM    INR 1.53 (H) 04/24/2022 05:31 PM      Lab Results   Component Value Date/Time    WBC 7.7 04/25/2022 01:31 AM    RBC 4.31 (L) 04/25/2022 01:31 AM    HEMOGLOBIN 13.7 (L) 04/25/2022 01:31 AM    HEMATOCRIT 40.6 (L) 04/25/2022 01:31 AM    MCV 94.2 04/25/2022 01:31 AM    MCH 31.8 04/25/2022 01:31 AM    MCHC 33.7 04/25/2022 01:31 AM    MPV 10.3 04/25/2022 01:31 AM    NEUTSPOLYS 53.60 04/24/2022 05:31 PM    LYMPHOCYTES 21.30 (L) 04/24/2022 05:31 PM    MONOCYTES 18.10 (H) 04/24/2022 05:31 PM    EOSINOPHILS 6.00 04/24/2022 05:31 PM    BASOPHILS 0.70 04/24/2022 05:31 PM      Lab Results   Component Value Date/Time    SODIUM 132 (L) 04/25/2022 01:31 AM    POTASSIUM 3.6 04/25/2022 01:31 AM    CHLORIDE 99 04/25/2022 01:31 AM    CO2 22 04/25/2022 01:31 AM    GLUCOSE 106 (H) 04/25/2022 01:31 AM    BUN 13 04/25/2022 01:31 AM    CREATININE 0.72 04/25/2022 01:31 AM      Lab Results   Component Value Date/Time    CHOLSTRLTOT 98 (L) 04/25/2022 01:31 AM    LDL 30 04/25/2022 01:31 AM    HDL 59 04/25/2022 01:31 AM    TRIGLYCERIDE 44 04/25/2022 01:31 AM       Lab Results   Component Value Date/Time    ALKPHOSPHAT 85 04/25/2022 01:31 AM    ASTSGOT 38 04/25/2022 01:31 AM    ALTSGPT 31 04/25/2022 01:31 AM    TBILIRUBIN 0.4 04/25/2022 01:31 AM        Imaging/Testing:    I interpreted and/or reviewed  the patient's neuroimaging    MR-BRAIN-W/O   Final Result         No acute intracranial process.      Remote left frontal infarct.      Age-related volume loss and chronic microvascular ischemic changes.      DX-CHEST-PORTABLE (1 VIEW)   Final Result      No evidence of acute cardiopulmonary process.      CT-CTA NECK WITH & W/O-POST PROCESSING   Final Result      1.  Moderate atherosclerotic narrowing of the RIGHT carotid bulb/proximal internal carotid artery and proximal LEFT vertebral artery.   2.  No dissection or occlusion of the cervical carotid or vertebral arteries.   3.  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      CT-CTA HEAD WITH & W/O-POST PROCESS   Final Result      No intracranial aneurysm, focal high-grade stenosis, or abrupt large vessel cut off.      CT-HEAD W/O   Final Result      1.  No acute intracranial abnormality.   2.  Chronic LEFT MCA distribution infarct.   3.  Diffuse atrophy.         EC-ECHOCARDIOGRAM COMPLETE W/O CONT    (Results Pending)       Assessment and Plan:  Carl Blount is a 77 year old man with known history of atrial fibrillation, compliant on xarelto, with transient neurologic symptoms.   MRI negative for stroke, however semiology is suggestive of likely transient cerebral ischemia.  CT angiogram of neck with patent cervical vessels without high grade, flow-limiting stenoses.  His stroke risk factors are relatively well-controlled, so recommend transition to alternative NOAC for presumed breakthrough event.    Problem list:  1.  Transient neurologic symptoms  2.  Atrial fibrillation  3.  Anticoagulated  4.  Hyperlipidemia  5.  Hypertension    Plan:   - q4h neurochecks/NIHSS   - long-term BP goal is 110-130/60-80; ok to restart home PO anti-HTN and titrate to goal   - stop xarelto, start apixaban 5mg BID   - stroke labs:  HgbA1c 5.8, LDL 30   - may decrease home atorvastatin dose to 40mg daily for goal LDL < 70   - PT/OT/SLP however do not anticipate long-term  needs   - from Neurology perspective, ok to defer TTE and long-term cardiac monitoring given known history of atrial fibrillation, and any findings will not change current medical management.   - please reconsult Neurology with any additional questions or concerns    The evaluation of the patient, and recommended management, was discussed with the attending hospitalist. I have performed a physical exam and reviewed and updated ROS and Plan today (4/25/2022).     Lencho Gould MD  Neurohospitalist, Acute Care Services

## 2022-04-25 NOTE — PROGRESS NOTES
4 Eyes Skin Assessment Completed by JERMAINE Vyas and JERMAINE Cooper.    Head WDL  Ears WDL  Nose WDL  Mouth WDL  Neck WDL  Breast/Chest WDL  Shoulder Blades WDL  Spine WDL  (R) Arm/Elbow/Hand WDL  (L) Arm/Elbow/Hand WDL  Abdomen WDL  Groin WDL  Scrotum/Coccyx/Buttocks WDL  (R) Leg WDL  (L) Leg WDL  (R) Heel/Foot/Toe WDL  (L) Heel/Foot/Toe WDL          Devices In Places Tele Box and Pulse Ox      Interventions In Place Pillows and Low Air Loss Mattress    Possible Skin Injury No    Pictures Uploaded Into Epic N/A  Wound Consult Placed N/A  RN Wound Prevention Protocol Ordered No

## 2022-04-25 NOTE — PROGRESS NOTES
Pt discharged with discharge paperwork. Reviewed medications and appointments with pt. Pt and pt's wife acknowledged all instructions. Pt was escorted off the unit in wheelchair by RN, wife took belongings. Pt AOx4, unlabored breathing on RA.

## 2022-04-25 NOTE — DISCHARGE INSTRUCTIONS
Discharge Instructions    Discharged to home by car with relative. Discharged via wheelchair, hospital escort: Yes.  Special equipment needed: Not Applicable    Be sure to schedule a follow-up appointment with your primary care doctor or any specialists as instructed.     Discharge Plan:   Diet Plan: Discussed  Activity Level: Discussed  Confirmed Follow up Appointment: Patient to Call and Schedule Appointment  Confirmed Symptoms Management: Discussed  Medication Reconciliation Updated: Yes    I understand that a diet low in cholesterol, fat, and sodium is recommended for good health. Unless I have been given specific instructions below for another diet, I accept this instruction as my diet prescription.   Other diet: cardiac    Special Instructions: None    · Is patient discharged on Warfarin / Coumadin?   No     Depression / Suicide Risk    As you are discharged from this [facility] facility, it is important to learn how to keep safe from harming yourself.    Recognize the warning signs:  · Abrupt changes in personality, positive or negative- including increase in energy   · Giving away possessions  · Change in eating patterns- significant weight changes-  positive or negative  · Change in sleeping patterns- unable to sleep or sleeping all the time   · Unwillingness or inability to communicate  · Depression  · Unusual sadness, discouragement and loneliness  · Talk of wanting to die  · Neglect of personal appearance   · Rebelliousness- reckless behavior  · Withdrawal from people/activities they love  · Confusion- inability to concentrate     If you or a loved one observes any of these behaviors or has concerns about self-harm, here's what you can do:  · Talk about it- your feelings and reasons for harming yourself  · Remove any means that you might use to hurt yourself (examples: pills, rope, extension cords, firearm)  · Get professional help from the community (Mental Health, Substance Abuse, psychological  counseling)  · Do not be alone:Call your Safe Contact- someone whom you trust who will be there for you.  · Call your local CRISIS HOTLINE 493-9049 or 279-959-5636  · Call your local Children's Mobile Crisis Response Team [place] (415) 482-8401 or www.[employer]  · Call the toll free National Suicide Prevention Hotlines   · National Suicide Prevention Lifeline 689-742-WDBZ (2779)  · National Hope Line Network 800-SUICIDE (314-2815)        -follow up with pcp within 1 week.

## 2022-04-25 NOTE — CARE PLAN
The patient is Stable - Low risk of patient condition declining or worsening    Shift Goals  Clinical Goals: MRI, neuro checks  Patient Goals: MRI, neuro checks, rest    Progress made toward(s) clinical / shift goals:  yes    Patient is not progressing towards the following goals: none      Problem: Optimal Care of the Stroke Patient  Goal: Optimal emergency care for the stroke patient  Outcome: Progressing  Goal: Optimal acute care for the stroke patient  Outcome: Progressing     Problem: Knowledge Deficit - Stroke Education  Goal: Patient's knowledge of stroke and risk factors will improve  Outcome: Progressing     Problem: Psychosocial - Patient Condition  Goal: Patient's ability to verbalize feelings about condition will improve  Outcome: Progressing  Goal: Patient's ability to re-evaluate and adapt role responsibilities will improve  Outcome: Progressing     Problem: Discharge Planning - Stroke  Goal: Ensure Stroke Core Measures are met prior to discharge  Outcome: Progressing  Goal: Patient’s continuum of care needs will be met  Outcome: Progressing     Problem: Neuro Status  Goal: Neuro status will remain stable or improve  Outcome: Progressing     Problem: Hemodynamic Monitoring  Goal: Patient's hemodynamics, fluid balance and neurologic status will be stable or improve  Outcome: Progressing     Problem: Respiratory - Stroke Patient  Goal: Patient will achieve/maintain optimum respiratory rate/effort  Outcome: Progressing     Problem: Dysphagia  Goal: Dysphagia will improve  Outcome: Progressing     Problem: Risk for Aspiration  Goal: Patient's risk for aspiration will be absent or decrease  Outcome: Progressing     Problem: Urinary Elimination  Goal: Establish and maintain regular urinary output  Outcome: Progressing     Problem: Bowel Elimination  Goal: Establish and maintain regular bowel function  Outcome: Progressing     Problem: Mobility - Stroke  Goal: Patient's capacity to carry out activities will  improve  Outcome: Progressing  Goal: Spasticity will be prevented or improved  Outcome: Progressing  Goal: Subluxation will be prevented or improved  Outcome: Progressing     Problem: Self Care  Goal: Patient will have the ability to perform ADLs independently or with assistance (bathe, groom, dress, toilet and feed)  Outcome: Progressing     Problem: Knowledge Deficit - Standard  Goal: Patient and family/care givers will demonstrate understanding of plan of care, disease process/condition, diagnostic tests and medications  Outcome: Progressing

## 2022-04-25 NOTE — CONSULTS
Physical Medicine and Rehabilitation Consultation              Date of initial consultation: 4/25/2022  Consulting provider: Rito Trevino MD  Reason for consultation: assess for acute inpatient rehab appropriateness  LOS: 0 Day(s)    Chief complaint: stroke    HPI: The patient is a 77 y.o. male with a past medical history of A. fib on Xarelto, hyperlipidemia, hypertension, prior stroke without residual deficits, left ICA stenosis status post CEA;  who presented on 4/24/2022  4:58 PM with left facial droop and word finding difficulty.  Patient was seen by neurology, found to have a NIH score of 0.  CT head showed no acute abnormalities, CTA head and neck showed no LVO or high-grade stenosis.  MRI brain also negative for acute findings.  Symptom differential includes TIA versus recrudescence      ROS  Pertinent positives are mentioned in the HPI, all others reviewed and are negative.    IMAGING:  MR brain 4/24/2020  No acute intracranial process.  Remote left frontal infarct.  Age-related volume loss and chronic microvascular ischemic changes.    PROCEDURES:  None    PMH:  Past Medical History:   Diagnosis Date    Afib (HCC)     Atrial fibrillation (HCC)     Hyperlipidemia     Hypertension     Stroke (HCC)        PSH:  History reviewed. No pertinent surgical history.    FHX:  Non-pertinent to today's issues    Medications:  Current Facility-Administered Medications   Medication Dose    atorvastatin (LIPITOR) tablet 80 mg  80 mg    cyclobenzaprine (Flexeril) tablet 10 mg  10 mg    finasteride (PROSCAR) tablet 5 mg  5 mg    hydroxychloroquine (Plaquenil) tablet 200 mg  200 mg    LORazepam (ATIVAN) tablet 0.5 mg  0.5 mg    omeprazole (PRILOSEC) capsule 20 mg  20 mg    rivaroxaban (XARELTO) tablet 20 mg  20 mg    tamsulosin (FLOMAX) capsule 0.4 mg  0.4 mg    traZODone (DESYREL) tablet 100 mg  100 mg    senna-docusate (PERICOLACE or SENOKOT S) 8.6-50 MG per tablet 2 Tablet  2 Tablet    And    polyethylene glycol/lytes  "(MIRALAX) PACKET 1 Packet  1 Packet    And    magnesium hydroxide (MILK OF MAGNESIA) suspension 30 mL  30 mL    And    bisacodyl (DULCOLAX) suppository 10 mg  10 mg    hydroCHLOROthiazide (HYDRODIURIL) tablet 25 mg  25 mg    lisinopril (PRINIVIL) tablet 20 mg  20 mg    metoprolol SR (TOPROL XL) tablet 50 mg  50 mg       Allergies:  Allergies   Allergen Reactions    Amlodipine Unspecified     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6 8.6     Recent Results (from the past 24 hour(s))   CBC WITH DIFFERENTIAL    Collection Time: 04/24/22  5:31 PM   Result Value Ref Range    WBC 7.2 4.8 - 10.8 K/uL    RBC 4.04 (L) 4.70 - 6.10 M/uL    Hemoglobin 12.9 (L) 14.0 - 18.0 g/dL    Hematocrit 37.1 (L) 42.0 - 52.0 %    MCV 91.8 81.4 - 97.8 fL    MCH 31.9 27.0 - 33.0 pg    MCHC 34.8 33.7 - 35.3 g/dL    RDW 44.1 35.9 - 50.0 fL    Platelet Count 169 164 - 446 K/uL    MPV 10.2 9.0 - 12.9 fL    Neutrophils-Polys 53.60 44.00 - 72.00 %    Lymphocytes 21.30 (L) 22.00 - 41.00 %    Monocytes 18.10 (H) 0.00 - 13.40 %    Eosinophils 6.00 0.00 - 6.90 %    Basophils 0.70 0.00 - 1.80 %    Immature Granulocytes 0.30 0.00 - 0.90 %    Nucleated RBC 0.00 /100 WBC    Neutrophils (Absolute) 3.86 1.82 - 7.42 K/uL    Lymphs (Absolute) 1.53 1.00 - 4.80 K/uL    Monos (Absolute) 1.30 (H) 0.00 - 0.85 K/uL    Eos (Absolute) 0.43 0.00 - 0.51 K/uL    Baso (Absolute) " 0.05 0.00 - 0.12 K/uL    Immature Granulocytes (abs) 0.02 0.00 - 0.11 K/uL    NRBC (Absolute) 0.00 K/uL   COMP METABOLIC PANEL    Collection Time: 22  5:31 PM   Result Value Ref Range    Sodium 128 (L) 135 - 145 mmol/L    Potassium 3.8 3.6 - 5.5 mmol/L    Chloride 94 (L) 96 - 112 mmol/L    Co2 21 20 - 33 mmol/L    Anion Gap 13.0 7.0 - 16.0    Glucose 102 (H) 65 - 99 mg/dL    Bun 17 8 - 22 mg/dL    Creatinine 0.93 0.50 - 1.40 mg/dL    Calcium 8.6 8.5 - 10.5 mg/dL    AST(SGOT) 41 12 - 45 U/L    ALT(SGPT) 34 2 - 50 U/L    Alkaline Phosphatase 90 30 - 99 U/L    Total Bilirubin 0.4 0.1 - 1.5 mg/dL    Albumin 4.0 3.2 - 4.9 g/dL    Total Protein 6.8 6.0 - 8.2 g/dL    Globulin 2.8 1.9 - 3.5 g/dL    A-G Ratio 1.4 g/dL   PROTHROMBIN TIME    Collection Time: 22  5:31 PM   Result Value Ref Range    PT 17.9 (H) 12.0 - 14.6 sec    INR 1.53 (H) 0.87 - 1.13   APTT    Collection Time: 22  5:31 PM   Result Value Ref Range    APTT 38.2 (H) 24.7 - 36.0 sec   COD (ADULT)    Collection Time: 22  5:31 PM   Result Value Ref Range    ABO Grouping Only A     Rh Grouping Only POS     Antibody Screen-Cod NEG    TROPONIN    Collection Time: 22  5:31 PM   Result Value Ref Range    Troponin T 32 (H) 6 - 19 ng/L   ESTIMATED GFR    Collection Time: 22  5:31 PM   Result Value Ref Range    GFR (CKD-EPI) 84 >60 mL/min/1.73 m 2   EKG (NOW)    Collection Time: 22  5:33 PM   Result Value Ref Range    Report       [facility] Emergency Dept.    Test Date:  2022  Pt Name:    SHANDRA JOYNER               Department: ER  MRN:        [id number]                      Room:  Gender:     Male                         Technician: [id number]  :        1945                   Requested By:YOUSUF WRIGHT  Order #:    [id number]                    Reading MD: YOUSUF WRIGHT. AMD    Measurements  Intervals                                Axis  Rate:       86                           P:          14  AK:          144                          QRS:        -54  QRSD:       134                          T:          99  QT:         424  QTc:        508    Interpretive Statements  SINUS RHYTHM  PAIRED VENTRICULAR PREMATURE COMPLEXES  NONSPECIFIC IVCD WITH LAD  ABNRM R PROG, CONSIDER ASMI OR LEAD PLACEMENT  Compared to ECG 04/15/2022 11:09:34  Intraventricular conduction delay now present  Myocardial infarct finding now present  Left bundle-branch block no longer present  Electronically Signed O n 4- 19:10:20 PDT by YOUSUF WRIGHT. AMD     ABO Rh Confirm    Collection Time: 04/25/22  1:31 AM   Result Value Ref Range    ABO Rh Confirm A POS    Lipid Profile    Collection Time: 04/25/22  1:31 AM   Result Value Ref Range    Cholesterol,Tot 98 (L) 100 - 199 mg/dL    Triglycerides 44 0 - 149 mg/dL    HDL 59 >=40 mg/dL    LDL 30 <100 mg/dL   Comp Metabolic Panel (CMP)    Collection Time: 04/25/22  1:31 AM   Result Value Ref Range    Sodium 132 (L) 135 - 145 mmol/L    Potassium 3.6 3.6 - 5.5 mmol/L    Chloride 99 96 - 112 mmol/L    Co2 22 20 - 33 mmol/L    Anion Gap 11.0 7.0 - 16.0    Glucose 106 (H) 65 - 99 mg/dL    Bun 13 8 - 22 mg/dL    Creatinine 0.72 0.50 - 1.40 mg/dL    Calcium 8.6 8.5 - 10.5 mg/dL    AST(SGOT) 38 12 - 45 U/L    ALT(SGPT) 31 2 - 50 U/L    Alkaline Phosphatase 85 30 - 99 U/L    Total Bilirubin 0.4 0.1 - 1.5 mg/dL    Albumin 3.5 3.2 - 4.9 g/dL    Total Protein 6.2 6.0 - 8.2 g/dL    Globulin 2.7 1.9 - 3.5 g/dL    A-G Ratio 1.3 g/dL   CBC without Differential    Collection Time: 04/25/22  1:31 AM   Result Value Ref Range    WBC 7.7 4.8 - 10.8 K/uL    RBC 4.31 (L) 4.70 - 6.10 M/uL    Hemoglobin 13.7 (L) 14.0 - 18.0 g/dL    Hematocrit 40.6 (L) 42.0 - 52.0 %    MCV 94.2 81.4 - 97.8 fL    MCH 31.8 27.0 - 33.0 pg    MCHC 33.7 33.7 - 35.3 g/dL    RDW 45.0 35.9 - 50.0 fL    Platelet Count 176 164 - 446 K/uL    MPV 10.3 9.0 - 12.9 fL   HEMOGLOBIN A1C    Collection Time: 04/25/22  1:31 AM   Result Value Ref Range     Glycohemoglobin 5.8 (H) 4.0 - 5.6 %    Est Avg Glucose 120 mg/dL   ESTIMATED GFR    Collection Time: 04/25/22  1:31 AM   Result Value Ref Range    GFR (CKD-EPI) 94 >60 mL/min/1.73 m 2       ASSESSMENT:   Patient discharged before I was able to see him     Thank you for allowing us to participate in the care of this patient.     45 minutes of non billable time was spent researching his case and preparing this note      Marko Richmond, DO   Physical Medicine and Rehabilitation     Please note that this dictation was created using voice recognition software. I have made every reasonable attempt to correct obvious errors, but there may be errors of grammar and possibly content that I did not discover before finalizing the note.

## 2022-04-25 NOTE — ED NOTES
Report from Devon. Pt A/Ox4, placed on the monitor, PIV placed and blood collected and sent to the lab.   NIH score of 0 at this time. Pt's wife at bedside  Neuro NP @ bedside

## 2022-04-25 NOTE — THERAPY
04/25/22 1140   Interdisciplinary Plan of Care Collaboration   Collaboration Comments OT referral received. Per PT (per RN) pt is up self, mobilizing and completing BADL without assist or difficulty. Appears to be at baseline from OT standpoint. Eval deferred due to no needs.

## 2022-04-25 NOTE — H&P
Hospital Medicine History & Physical Note    Date of Service  4/24/2022    Primary Care Physician  Zuleyma Cuevas M.D.    Consultants  neurology    Specialist Names: Dr. Shore    Code Status  Full Code    Chief Complaint  Chief Complaint   Patient presents with   • Possible Stroke       History of Presenting Illness  Carl Blount is a 77 y.o. male with past medical history of atrial fibrillation, essential hypertension, dyslipidemia, remote history of CVA, left internal carotid artery stenosis with CEA who presented 4/24/2022 with left facial droop and difficulty with word finding started around 4:15 PM this afternoon.  It lasted for about 30 minutes and went away on its own.  The patient has similar episode this past Friday where he has difficulty with word finding and lasted for about 15-minute.  He denies any neurological deficit over his extremity.  His symptoms have now resolved.  He is not a tPA candidate.  In the ER because of his symptom code stroke was called and neurology was consulted.  CT scan of the head and CT angiogram head and neck and showed no acute finding.  He will be admitted for to neuro floor for observation.    I discussed the plan of care with patient.    Review of Systems  Review of Systems   Constitutional: Negative for chills, fever and weight loss.   HENT: Negative for congestion, hearing loss, nosebleeds, sinus pain and sore throat.    Eyes: Negative for blurred vision, pain, discharge and redness.   Respiratory: Negative for cough, sputum production, shortness of breath, wheezing and stridor.    Cardiovascular: Negative for chest pain, palpitations, orthopnea and claudication.   Gastrointestinal: Negative for abdominal pain, blood in stool, constipation, diarrhea, heartburn, nausea and vomiting.   Genitourinary: Negative for dysuria, frequency, hematuria and urgency.   Musculoskeletal: Negative for back pain, myalgias and neck pain.   Skin: Negative for itching and rash.    Neurological: Positive for speech change and focal weakness. Negative for dizziness, seizures and headaches.   Psychiatric/Behavioral: Negative for depression. The patient is not nervous/anxious and does not have insomnia.        Past Medical History   has a past medical history of Afib (HCC), Atrial fibrillation (HCC), Hyperlipidemia, Hypertension, and Stroke (HCC).    Surgical History  Reviewed and no pertinent past surgical history    Family History    Family history reviewed with patient. There is no family history that is pertinent to the chief complaint.     Social History   reports that he has quit smoking. He has never used smokeless tobacco. He reports current alcohol use. He reports that he does not use drugs.    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 Taking?   LORazepam (ATIVAN) 0.5 MG Tab UNK at UNK Significant Other Yes Yes   Sig: Take 0.5 mg by mouth 3 times a day as needed for Anxiety.   Omega-3 Fatty Acids (FISH OIL) 1000 MG Cap capsule UNK at UNK Significant Other Yes No   Sig: Take 1,000 mg by mouth every day.   atorvastatin (LIPITOR) 80 MG tablet UNK at UNK Significant Other No No   Sig: TAKE 1 TABLET BY MOUTH AT  BEDTIME   cyanocobalamin (VITAMIN B12) 1000 MCG Tab UNK at UNK Significant Other Yes No   Sig: Take 1,000 mcg by mouth every day.   cyclobenzaprine (FLEXERIL) 10 MG Tab UNK at UNK Significant Other Yes No   Sig: Take 10 mg by mouth 3 times a day as needed for Muscle Spasms.   finasteride (PROSCAR) 5 MG Tab UNK at UNK Significant Other Yes No   Sig: Take 5 mg by mouth every day.   hydroCHLOROthiazide (HYDRODIURIL) 25 MG Tab UNK at UNK Significant Other Yes No   Sig: Take 25 mg by mouth every day.   hydroxychloroquine (PLAQUENIL) 200 MG Tab UNK at UNK Significant Other Yes No   Sig: Take 200 mg by  mouth 2 times a day.   lidocaine (LIDODERM) 5 % Patch UNK at UNK Significant Other Yes No   Sig: Place 1 Patch on the skin 1 time a day as needed.   lisinopril (PRINIVIL) 20 MG Tab UNK at UNK Significant Other Yes No   Sig: Take 20 mg by mouth every day.   loratadine (CLARITIN) 10 MG Tab UNK at UNK Significant Other Yes Yes   Sig: Take 10 mg by mouth every day.   metoprolol SR (TOPROL XL) 50 MG TABLET SR 24 HR UNK at UNK Significant Other No No   Sig: Take 1 Tablet by mouth every day.   pantoprazole (PROTONIX) 40 MG Tablet Delayed Response UNK at UNK Significant Other Yes No   Sig: Take 40 mg by mouth every day.   rivaroxaban (XARELTO) 20 MG Tab tablet UNK at UNK Significant Other Yes No   Sig: Take 20 mg by mouth with dinner.   sulfamethoxazole-trimethoprim (BACTRIM DS) 800-160 MG tablet UNK at UNK Significant Other Yes No   Sig: Pt filled a 14 day course of BACTRIM om 4/18   tamsulosin (FLOMAX) 0.4 MG capsule UNK at UNK Significant Other Yes No   Sig: Take 0.4 mg by mouth at bedtime.   therapeutic multivitamin-minerals (THERAGRAN-M) Tab UNK at UNK Significant Other Yes No   Sig: Take 1 tablet by mouth every day.   traZODone (DESYREL) 100 MG Tab UNK at UNK Significant Other Yes No   Sig: Take 100 mg by mouth every evening.      Facility-Administered Medications: None       Physical Exam  Temp:  [36.3 °C (97.4 °F)] 36.3 °C (97.4 °F)  Pulse:  [80] 80  Resp:  [20] 20  BP: (186)/(74) 186/74  SpO2:  [97 %] 97 %  Blood Pressure : (!) 186/74   Temperature: 36.3 °C (97.4 °F)   Pulse: 80   Respiration: 20   Pulse Oximetry: 97 %       Physical Exam  Vitals reviewed.   Constitutional:       General: He is not in acute distress.     Appearance: Normal appearance. He is normal weight. He is not ill-appearing, toxic-appearing or diaphoretic.   HENT:      Head: Normocephalic and atraumatic.      Right Ear: Tympanic membrane, ear canal and external ear normal.      Left Ear: Tympanic membrane, ear canal and external ear normal.       Nose: No congestion or rhinorrhea.   Eyes:      Extraocular Movements: Extraocular movements intact.      Conjunctiva/sclera: Conjunctivae normal.      Pupils: Pupils are equal, round, and reactive to light.   Cardiovascular:      Rate and Rhythm: Normal rate and regular rhythm.      Pulses: Normal pulses.      Heart sounds: Normal heart sounds. No murmur heard.    No friction rub. No gallop.   Pulmonary:      Effort: Pulmonary effort is normal. No respiratory distress.      Breath sounds: Normal breath sounds. No stridor. No wheezing or rhonchi.   Abdominal:      General: Bowel sounds are normal. There is no distension.      Palpations: Abdomen is soft. There is no mass.      Tenderness: There is no abdominal tenderness. There is no guarding or rebound.      Hernia: No hernia is present.   Musculoskeletal:         General: No swelling or tenderness.      Cervical back: Normal range of motion and neck supple.   Skin:     General: Skin is warm.      Findings: No erythema.   Neurological:      General: No focal deficit present.      Mental Status: He is alert and oriented to person, place, and time.         Laboratory:  Recent Labs     04/24/22  1731   WBC 7.2   RBC 4.04*   HEMOGLOBIN 12.9*   HEMATOCRIT 37.1*   MCV 91.8   MCH 31.9   MCHC 34.8   RDW 44.1   PLATELETCT 169   MPV 10.2         No results for input(s): ALTSGPT, ASTSGOT, ALKPHOSPHAT, TBILIRUBIN, DBILIRUBIN, GAMMAGT, AMYLASE, LIPASE, ALB, PREALBUMIN, GLUCOSE in the last 72 hours.      No results for input(s): NTPROBNP in the last 72 hours.      No results for input(s): TROPONINT in the last 72 hours.    Imaging:  CT-CTA NECK WITH & W/O-POST PROCESSING   Final Result      1.  Moderate atherosclerotic narrowing of the RIGHT carotid bulb/proximal internal carotid artery and proximal LEFT vertebral artery.   2.  No dissection or occlusion of the cervical carotid or vertebral arteries.   3.  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      CT-CTA HEAD WITH &  W/O-POST PROCESS   Final Result      No intracranial aneurysm, focal high-grade stenosis, or abrupt large vessel cut off.      CT-HEAD W/O   Final Result      1.  No acute intracranial abnormality.   2.  Chronic LEFT MCA distribution infarct.   3.  Diffuse atrophy.         DX-CHEST-PORTABLE (1 VIEW)    (Results Pending)   EC-ECHOCARDIOGRAM COMPLETE W/O CONT    (Results Pending)   MR-BRAIN-W/O    (Results Pending)           Assessment/Plan:  I anticipate this patient is appropriate for observation status at this time.    * Facial droop- (present on admission)  Assessment & Plan  Symptoms have resolved  Not a tPA candidate  History of CVA  CT scan of the head and neck as above  MRI pending  Echo pending  Neuro following  Per neuro permissive hypertension is not indicated  Check lipid panel  PT OT and speech    Atrial fibrillation (HCC)- (present on admission)  Assessment & Plan  History of  Rate controlled on metoprolol  Continue Xarelto    Essential hypertension- (present on admission)  Assessment & Plan  Continue outpatient medications      VTE prophylaxis: therapeutic anticoagulation with xarelto

## 2022-04-25 NOTE — CONSULTS
Neurology STROKE CODE H&P  [facility] Acute Neurology    Referring Physician: Raffi Lechuga M.D.    STROKE CODE:   Chief Complaint   Patient presents with   • Possible Stroke       To obtain the most accurate data regarding the time called, and time patient seen, refer to the stroke run-sheet and chart.  For time of CT, refer to the radiology report. See A&P below for TPA Decision and door to needle time if and when applicable.    HPI: Carl Blount is a 77 y.o. male with history of Afib on Xarelto, hyperlipidemia, hypertension, and prior stroke (left MCA territory 2012) without residual deficits, left ICA stenosis s/p left CEA presenting to the hospital for transient mild left facial droop and word finding difficulty and consulted for possible stroke. The patient was in his usual state of health today when at 16:15 he had acute onset of speech difficulty described as not being able to get his words out. EMS was notified and noted the patient was unable to name items and had mild left facial droop. The patient was transferred to [facility] as a Code Stroke. Symptoms lasted approximately 30 minutes prior to complete resolution. En route, initial /75, FSBG 96 mg/dL, and he reports last taking Xarelto yesterday evening. NIHSS upon arrival of 0 with symptoms completely resolved prior to arrival. CT imaging was completed with results and interpretation below. Denies headache, vision changes, focal extremity weakness, numbness, tingling, ataxia, imbalance, abnormal movements, LOC, infectious prodrome, memory loss, or confusion.     Review of systems: In addition to what is detailed in the HPI above, all other systems reviewed and are negative.    Past Medical History:    has a past medical history of Afib (HCC), Atrial fibrillation (HCC), Hyperlipidemia, Hypertension, and Stroke (HCC).    FHx:  family history is not on file.    SHx:   reports that he has quit smoking. He has never used smokeless tobacco. He  reports current alcohol use. He reports that he does not use drugs.    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    Current Outpatient Medications:   •  LORazepam (ATIVAN) 0.5 MG Tab, Take 0.5 mg by mouth 3 times a day as needed for Anxiety., Disp: , Rfl:   •  loratadine (CLARITIN) 10 MG Tab, Take 10 mg by mouth every day., Disp: , Rfl:   •  finasteride (PROSCAR) 5 MG Tab, Take 5 mg by mouth every day., Disp: , Rfl:   •  sulfamethoxazole-trimethoprim (BACTRIM DS) 800-160 MG tablet, Pt filled a 14 day course of BACTRIM om 4/18, Disp: , Rfl:   •  lidocaine (LIDODERM) 5 % Patch, Place 1 Patch on the skin 1 time a day as needed., Disp: , Rfl:   •  traZODone (DESYREL) 100 MG Tab, Take 100 mg by mouth every evening., Disp: , Rfl:   •  metoprolol SR (TOPROL XL) 50 MG TABLET SR 24 HR, Take 1 Tablet by mouth every day., Disp: 90 Tablet, Rfl: 3  •  atorvastatin (LIPITOR) 80 MG tablet, TAKE 1 TABLET BY MOUTH AT  BEDTIME, Disp: 90 Tablet, Rfl: 3  •  hydroxychloroquine (PLAQUENIL) 200 MG Tab, Take 200 mg by mouth 2 times a day., Disp: , Rfl:   •  therapeutic multivitamin-minerals (THERAGRAN-M) Tab, Take 1 tablet by mouth every day., Disp: , Rfl:   •  cyanocobalamin (VITAMIN B12) 1000 MCG Tab, Take 1,000 mcg by mouth every day., Disp: , Rfl:   •  Omega-3 Fatty Acids (FISH OIL) 1000 MG Cap capsule, Take 1,000 mg by mouth every day., Disp: , Rfl:   •  cyclobenzaprine (FLEXERIL) 10 MG Tab, Take 10 mg by mouth 3 times a day as needed for Muscle Spasms., Disp: , Rfl:   •  hydroCHLOROthiazide (HYDRODIURIL) 25 MG Tab, Take 25 mg by mouth every day., Disp: , Rfl:   •  lisinopril (PRINIVIL) 20 MG Tab, Take 20 mg by mouth every day., Disp: , Rfl:   •  pantoprazole (PROTONIX) 40 MG Tablet Delayed Response, Take 40 mg by mouth every day., Disp: , Rfl:   •  rivaroxaban  "(XARELTO) 20 MG Tab tablet, Take 20 mg by mouth with dinner., Disp: , Rfl:   •  tamsulosin (FLOMAX) 0.4 MG capsule, Take 0.4 mg by mouth at bedtime., Disp: , Rfl:     Physical Examination:    Vitals:    04/24/22 1700 04/24/22 1718 04/24/22 1719   BP:  (!) 186/74    Pulse:  80    Resp:  20    Temp:   36.3 °C (97.4 °F)   TempSrc:   Temporal   SpO2:  97%    Weight: 104 kg (230 lb) 104 kg (230 lb)    Height:  1.854 m (6' 1\")        General: Patient is awake and in no acute distress  Eyes: Examination of optic disks not indicated at this time given acuity of consult.  CV: RRR    NEUROLOGICAL EXAM:     Mental status: Awake, alert, fully oriented, and follows commands.  Speech and language: Speech is clear and fluent. The patient is able to name, repeat, and comprehend.  Cranial nerve exam: Pupils are equal, round and reactive to light bilaterally. Visual fields are full. Extraocular muscles are intact. Sensation in the face is intact to light touch. Face is symmetric. Hearing to finger rub equal. Palate elevates symmetrically. Shoulder shrug is full. Tongue is midline.  Motor exam: Strength is antigravity in all extremities without drift appreciated. Tone is normal. No abnormal movements were seen on exam.  Sensory exam: No sensory deficits identified   Deep tendon reflexes:  Toes down-going bilaterally.  Coordination: No ataxia with a normal finger-nose-finger. Normal rapidly alternating movements.   Gait: Deferred as patient is actively transported to CT scanner.     NIH Stroke Scale:    1a. Level of Consciousness (Alert, drowsy, etc): 0= Alert    1b. LOC Questions (Month, age): 0= Answers both correctly    1c. LOC Commands (Open/close eyes make fist/let go): 0= Obeys both correctly    2.   Best Gaze (Eyes open - patient follows examiner's finger on face): 0= Normal    3.   Visual Fields (introduce visual stimulus/threat to patient's field quadrants): 0= No visual loss  4.   Facial Paresis (Show teeth, raise eyebrows " and squeeze eyes shut): 0= Normal     5a. Motor Arm - Left (Elevate arm to 90 degrees if patient is sitting, 45 degrees if  supine): 0= No drift    5b. Motor Arm - Right (Elevate arm to 90 degrees if patient is sitting, 45 degrees if supine): 0= No drift    6a. Motor Leg - Left (Elevate leg 30 degrees with patient supine): 0= No drift    6b. Motor Leg - Right  (Elevate leg 30 degrees with patient supine): 0= No drift    7.   Limb Ataxia (Finger-nose, heel down shin): 0= No ataxia    8.   Sensory (Pin prick to face, arm, trunk and leg - compare side to side): 0= Normal    9.  Best Language (Name item, describe a picture and read sentences): 0= No aphasia    10. Dysarthria (Evaluate speech clarity by patient repeating listed words): 0= Normal articulation    11. Extinction and Inattention (Use information from prior testing to identify neglect or  double simultaneous stimuli testing): 0= No neglect    Total NIH Score: 0      Modified [place] Scale (MRS): 0 = No symptoms      Objective Data:    Labs:  Lab Results   Component Value Date/Time    PROTHROMBTM 16.8 (H) 08/02/2021 08:04 PM    INR 1.40 (H) 08/02/2021 08:04 PM      Lab Results   Component Value Date/Time    WBC 8.7 04/15/2022 11:38 AM    RBC 4.78 04/15/2022 11:38 AM    HEMOGLOBIN 15.2 04/15/2022 11:38 AM    HEMATOCRIT 47.7 04/15/2022 11:38 AM    MCV 99.8 (H) 04/15/2022 11:38 AM    MCH 31.8 04/15/2022 11:38 AM    MCHC 31.9 (L) 04/15/2022 11:38 AM    MPV 10.9 04/15/2022 11:38 AM    NEUTSPOLYS 52.80 08/02/2021 08:04 PM    LYMPHOCYTES 31.10 08/02/2021 08:04 PM    MONOCYTES 12.00 08/02/2021 08:04 PM    EOSINOPHILS 3.20 08/02/2021 08:04 PM    BASOPHILS 0.70 08/02/2021 08:04 PM      Lab Results   Component Value Date/Time    SODIUM 138 04/15/2022 11:38 AM    POTASSIUM 4.5 04/15/2022 11:38 AM    CHLORIDE 103 04/15/2022 11:38 AM    CO2 22 04/15/2022 11:38 AM    GLUCOSE 109 (H) 04/15/2022 11:38 AM    BUN 12 04/15/2022 11:38 AM    CREATININE 0.70 04/15/2022 11:38 AM       Lab Results   Component Value Date/Time    CHOLSTRLTOT 121 08/03/2021 03:20 AM    LDL 48 08/03/2021 03:20 AM    HDL 58 08/03/2021 03:20 AM    TRIGLYCERIDE 74 08/03/2021 03:20 AM       Lab Results   Component Value Date/Time    ALKPHOSPHAT 105 (H) 08/02/2021 08:04 PM    ASTSGOT 33 08/02/2021 08:04 PM    ALTSGPT 26 08/02/2021 08:04 PM    TBILIRUBIN 0.4 08/02/2021 08:04 PM        Imaging/Testing:    I interpreted and/or reviewed the patient's neuroimaging    CT-CTA NECK WITH & W/O-POST PROCESSING   Final Result      1.  Moderate atherosclerotic narrowing of the RIGHT carotid bulb/proximal internal carotid artery and proximal LEFT vertebral artery.   2.  No dissection or occlusion of the cervical carotid or vertebral arteries.   3.  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      CT-CTA HEAD WITH & W/O-POST PROCESS   Final Result      No intracranial aneurysm, focal high-grade stenosis, or abrupt large vessel cut off.      CT-HEAD W/O   Final Result      1.  No acute intracranial abnormality.   2.  Chronic LEFT MCA distribution infarct.   3.  Diffuse atrophy.         DX-CHEST-PORTABLE (1 VIEW)    (Results Pending)       Presumed Mechanism by TOAST:  Cardioembolic      Assessment and Plan:    Carl Blount is a 77 y.o. male with relevant history of Afib on Xarelto, hyperlipidemia, hypertension, and prior stroke (left MCA territory 2012) without residual deficits, left ICA stenosis s/p left CEA presenting to the hospital for transient mild left facial droop and word finding difficulty and neurology was consulted for possible stroke. Symptoms lasted approximately 30 minutes prior to complete resolution. En route, initial /75, FSBG 96 mg/dL, and he reports last taking Xarelto yesterday evening. NIHSS upon arrival of 0 with symptoms completely resolved prior to arrival. CT head wo contrast showed no acute abnormalities with chronic left MCA territory infarct. CTA head and neck w/wo contrast showed no  LVO and atherosclerosis of bilateral ICAs without high grade stenosis. IV thrombolytics were deferred as patient is compliant on Xarelto. Differential diagnosis includes likely TIA versus recrudescence of prior stroke symptoms.     Plan:    -Admit for observation  -q4h and PRN neuro assessment. VS per nursing/unit protocol.   -Permissive HTN not indicated given no LVO or high grade stenosis. Gradually lower to Normotensive BP goal 110-130/60-80.   -Antihypertensives per primary team.   -Obtain MRI Brain wo contrast.    -Reports claustrophobia, administer Ativan 1-2mg IV once PRN for MRI  -Telemetry; currently SR. Known history of Afib/arrhythmia.   -Obtain TTE  -Continue home Xarelto 20mg PO every evening with dinner  -Continue home Atorvastatin 80 mg PO q HS for LDL goal <70. Check lipid panel.   -BG management per primary team. BG goal . Check hemoglobin A1c, goal <7%   -PT/OT/SLP eval and treat.   -Counseled patient at length regarding life style and risk factor modification for secondary stroke prevention.   -DVT PPX: SCDs and Eliquis        The evaluation of the patient, and recommended management, was discussed with Dr. Bhatt, Dr. Lechuga, and bedside RN.    Lencho Shore, MSN [facility]  Nurse Practitioner  [facility]  (t) [phone]

## 2022-04-25 NOTE — DISCHARGE SUMMARY
Discharge Summary    CHIEF COMPLAINT ON ADMISSION  Chief Complaint   Patient presents with   • Possible Stroke       Reason for Admission  STROKE     Admission Date  4/24/2022    CODE STATUS  Full Code    HPI & HOSPITAL COURSE  This is a 77 y.o. male here with facial droop and difficulty word finding.  Carl Blount is a 77 y.o. male with past medical history of atrial fibrillation, essential hypertension, dyslipidemia, remote history of CVA, left internal carotid artery stenosis with CEA who presented 4/24/2022 with left facial droop and difficulty with word finding started around 4:15 PM this afternoon.  It lasted for about 30 minutes and went away on its own.  The patient has similar episode this past Friday where he has difficulty with word finding and lasted for about 15-minute.  He denies any neurological deficit over his extremity.  His symptoms have now resolved.  He is not a tPA candidate.  In the ER because of his symptom code stroke was called and neurology was consulted.  CT scan of the head and CT angiogram head and neck and showed no acute finding.  He was admitted for to CDU floor for observation.  MRI was negative for any acute process.  Neurology signed off and recommended changing Xarelto to Eliquis and decreasing Lipitor dose to 40 mg daily.  Lipid panel is within defined limits, hemoglobin A1c is 5.8.  Patient will be discharged with appropriate medication changes.      Therefore, he is discharged in good and stable condition to home with close outpatient follow-up.    The patient recovered much more quickly than anticipated on admission.    Discharge Date  4/25/2022    FOLLOW UP ITEMS POST DISCHARGE  PCP    DISCHARGE DIAGNOSES  Principal Problem:    Facial droop POA: Yes  Active Problems:    Essential hypertension POA: Yes    Atrial fibrillation (HCC) POA: Yes  Resolved Problems:    * No resolved hospital problems. *      FOLLOW UP  Future Appointments   Date Time Provider Department  Center   5/2/2022 10:15 AM [facility] EXAM 5 [facility] None   6/14/2022 10:40 AM [facility] None     Zuleyma Cuevas M.D.  [address]  [facility] [id number]  [place] [address]  [phone]    In 1 week        MEDICATIONS ON DISCHARGE     Medication List      START taking these medications      Instructions   apixaban 5mg Tabs  Commonly known as: ELIQUIS   Take 1 Tablet by mouth 2 times a day.  Dose: 5 mg        CHANGE how you take these medications      Instructions   atorvastatin 80 MG tablet  What changed: how much to take  Commonly known as: LIPITOR   Doctor's comments: Requesting 1 year supply  Take 0.5 Tablets by mouth at bedtime.  Dose: 40 mg        CONTINUE taking these medications      Instructions   cyanocobalamin 1000 MCG Tabs  Commonly known as: VITAMIN B12   Take 1,000 mcg by mouth every day.  Dose: 1,000 mcg     cyclobenzaprine 10 mg Tabs  Commonly known as: Flexeril   Take 10 mg by mouth 3 times a day as needed for Muscle Spasms.  Dose: 10 mg     finasteride 5 MG Tabs  Commonly known as: PROSCAR   Take 5 mg by mouth every day.  Dose: 5 mg     fish oil 1000 MG Caps capsule   Take 1,000 mg by mouth every day.  Dose: 1,000 mg     hydroCHLOROthiazide 25 MG Tabs  Commonly known as: HYDRODIURIL   Take 25 mg by mouth every day.  Dose: 25 mg     hydroxychloroquine 200 MG Tabs  Commonly known as: Plaquenil   Take 200 mg by mouth 2 times a day.  Dose: 200 mg     lidocaine 5 % Ptch  Commonly known as: LIDODERM   Place 1 Patch on the skin 1 time a day as needed.  Dose: 1 Patch     lisinopril 20 MG Tabs  Commonly known as: PRINIVIL   Take 20 mg by mouth every day.  Dose: 20 mg     loratadine 10 MG Tabs  Commonly known as: CLARITIN   Take 10 mg by mouth every day.  Dose: 10 mg     LORazepam 0.5 MG Tabs  Commonly known as: ATIVAN   Take 0.5 mg by mouth 3 times a day as needed for Anxiety.  Dose: 0.5 mg     metoprolol SR 50 MG Tb24  Commonly known as: TOPROL XL   Take 1 Tablet by mouth every day.  Dose: 50  mg     pantoprazole 40 MG Tbec  Commonly known as: PROTONIX   Take 40 mg by mouth every day.  Dose: 40 mg     tamsulosin 0.4 MG capsule  Commonly known as: FLOMAX   Take 0.4 mg by mouth at bedtime.  Dose: 0.4 mg     therapeutic multivitamin-minerals Tabs   Take 1 tablet by mouth every day.  Dose: 1 Tablet     traZODone 100 MG Tabs  Commonly known as: DESYREL   Take 100 mg by mouth every evening.  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  Weight bearing as tolerated    CONSULTATIONS  Neuro    PROCEDURES  None    LABORATORY  Lab Results   Component Value Date    SODIUM 132 (L) 04/25/2022    POTASSIUM 3.6 04/25/2022    CHLORIDE 99 04/25/2022    CO2 22 04/25/2022    GLUCOSE 106 (H) 04/25/2022    BUN 13 04/25/2022    CREATININE 0.72 04/25/2022        Lab Results   Component Value Date    WBC 7.7 04/25/2022    HEMOGLOBIN 13.7 (L) 04/25/2022    HEMATOCRIT 40.6 (L) 04/25/2022    PLATELETCT 176 04/25/2022        Total time of the discharge process exceeds 32 minutes.

## 2022-04-25 NOTE — ED NOTES
TO for ativan rec'd for claustrophobia.  Order rec'd for patient to come off tele monitor for MRI.  Bed assigned to CDU T 214.  Report to Lilliam DENA.

## 2022-04-26 ENCOUNTER — PATIENT OUTREACH (OUTPATIENT)
Dept: HEALTH INFORMATION MANAGEMENT | Facility: OTHER | Age: 77
End: 2022-04-26
Payer: MEDICARE

## 2022-04-26 NOTE — PROGRESS NOTES
Community Health Worker Farrukh attempted to reach the patient after discharge from the hospital to follow up. Patient did not answer and CHW left a detailed voicemail requesting a call back.     Community [facility] Gibran Chadwick will attempt again at a later date.

## 2022-04-29 SDOH — ECONOMIC STABILITY: FOOD INSECURITY: WITHIN THE PAST 12 MONTHS, YOU WORRIED THAT YOUR FOOD WOULD RUN OUT BEFORE YOU GOT MONEY TO BUY MORE.: NEVER TRUE

## 2022-04-29 SDOH — ECONOMIC STABILITY: FOOD INSECURITY: WITHIN THE PAST 12 MONTHS, THE FOOD YOU BOUGHT JUST DIDN'T LAST AND YOU DIDN'T HAVE MONEY TO GET MORE.: NEVER TRUE

## 2022-04-29 SDOH — ECONOMIC STABILITY: TRANSPORTATION INSECURITY
IN THE PAST 12 MONTHS, HAS THE LACK OF TRANSPORTATION KEPT YOU FROM MEDICAL APPOINTMENTS OR FROM GETTING MEDICATIONS?: NO

## 2022-04-29 SDOH — ECONOMIC STABILITY: TRANSPORTATION INSECURITY
IN THE PAST 12 MONTHS, HAS LACK OF TRANSPORTATION KEPT YOU FROM MEETINGS, WORK, OR FROM GETTING THINGS NEEDED FOR DAILY LIVING?: NO

## 2022-04-29 ASSESSMENT — SOCIAL DETERMINANTS OF HEALTH (SDOH): HOW HARD IS IT FOR YOU TO PAY FOR THE VERY BASICS LIKE FOOD, HOUSING, MEDICAL CARE, AND HEATING?: NOT HARD AT ALL

## 2022-04-29 NOTE — PROGRESS NOTES
CHW Vinson called the patient and introduced community care management. Pt declines CCM services at this time as he does not have an barriers to housing, transportation, or food.     CHW will remove the patient from CCM caseload.   Pt is scheduled with his PCP on 5/3/2022

## 2022-05-02 ENCOUNTER — ANTICOAGULATION VISIT (OUTPATIENT)
Dept: VASCULAR LAB | Facility: MEDICAL CENTER | Age: 77
End: 2022-05-02
Attending: INTERNAL MEDICINE
Payer: MEDICARE

## 2022-05-02 VITALS — DIASTOLIC BLOOD PRESSURE: 68 MMHG | SYSTOLIC BLOOD PRESSURE: 127 MMHG | HEART RATE: 72 BPM

## 2022-05-02 DIAGNOSIS — I48.0 PAROXYSMAL ATRIAL FIBRILLATION (HCC): ICD-10-CM

## 2022-05-02 PROCEDURE — 99212 OFFICE O/P EST SF 10 MIN: CPT

## 2022-05-02 NOTE — PROGRESS NOTES
PCP: Zuleyma Cuevas M.D.  Cardiologist: Juice Davis MD  Dx: AF  CHADSVASC = 6  HAS-BLED = 4  Target End Date = indefinite    Health Status Since Last Assessment   Pt recently admitted for possible stroke. Transitioned from Xarelto to Eliquis, pt tolerates Eliquis without any issue.  It is affordable ([facility]).     Adherence with DOAC Therapy   Pt has not missed any doses in the average week    Bleeding Risk Assessment     Denies Epistaxis   Pt denies any excessive or unusual bleeding/hematomas.  Pt denies any GI bleeds or hematemesis.  Pt denies any concerning daily headache or sub dural hematoma symptoms.     Pt denies any hematuria   Latest Hemoglobin 13.7   ETOH overuse Denies     Creatinine Clearance/Renal Function     Latest ClCr > 30 mL/min    Hepatic function   Latest LFTs non   Pt denies any history of liver dysfunction      Drug Interactions   Platelets: 176   ASA/other antiplatelets none   NSAID none   Other drug interactions not of clinical concern   X Verified no anticonvulsant or azole therapy, education provided for future use.     Examination   Blood Pressure WNL   Symptomatic hypotension none   Significant gait impairment/imbalance/high risk for falls? Age, gait    Final Assessment and Recommendations:   Patient appears stable from the anticoagulation standpoint.     Benefits of continued DOAC therapy outweigh risks for this patient   Recommend pt continue with current DOAC therapy    DOAC is  affordable     Other Actions: cmp/ cbc hemogram ordered prior to next visit    Follow up:   Will follow up with patient 3  months.    Suggested against any interruption to anticoagulation given recent event of possible TIA.     Misael Lozano, PharmD

## 2022-05-10 NOTE — PROGRESS NOTES
No chief complaint on file.      Problem List Items Addressed This Visit    None         History of present illness:  Carl Blount 77 y.o. male presents today for ***    Pt has so far 2 episodes of aphasia (Dec 2020 and Aug 2021). According to Rosie's note he also had bouts of confusion after his stroke which made her concerned that events might be related to seizures but pt and family denied this. They deny any staring off /zoning out spells either. They mentioned that he had a fall and hit his head possibly in May 2021. Did not lose consciousness but had no recollection of event. This is the only confusion episode that they know of. No convulsion or passing out spell. No further falls.      No family hx of seizures.      He drinks 2 cocktail drinks with vodka per night. No cigarettes or recreational drug use.      Mood is stable but can easily get stressed and anxious. No SI or HI.      He continues to take xarelto and asa. He is regularly f/u with Dr. Turpin. So far no surgical intervention recommended for the carotid stenosis.      He had 24hr EEG done.     Past medical history:   Past Medical History:   Diagnosis Date   • Afib (HCC)    • Atrial fibrillation (HCC)    • Hyperlipidemia    • Hypertension    • Stroke (HCC)        Past surgical history:   No past surgical history on file.    Family history:   No family history on file.    Social history:   Social History     Socioeconomic History   • Marital status:      Spouse name: Not on file   • Number of children: Not on file   • Years of education: Not on file   • Highest education level: Not on file   Occupational History   • Not on file   Tobacco Use   • Smoking status: Former Smoker   • Smokeless tobacco: Never Used   Vaping Use   • Vaping Use: Never used   Substance and Sexual Activity   • Alcohol use: Yes     Comment: 2 drinks/night   • Drug use: Never   • Sexual activity: Not on file   Other Topics Concern   • Not on file   Social  History Narrative   • Not on file     Social Determinants of Health     Financial Resource Strain: Low Risk    • Difficulty of Paying Living Expenses: Not hard at all   Food Insecurity: No Food Insecurity   • Worried About Running Out of Food in the Last Year: Never true   • Ran Out of Food in the Last Year: Never true   Transportation Needs: No Transportation Needs   • Lack of Transportation (Medical): No   • Lack of Transportation (Non-Medical): No   Physical Activity: Not on file   Stress: Not on file   Social Connections: Not on file   Intimate Partner Violence: Not on file   Housing Stability: Not on file       Current medications:   Current Outpatient Medications   Medication   • apixaban (ELIQUIS) 5mg Tab   • atorvastatin (LIPITOR) 80 MG tablet   • finasteride (PROSCAR) 5 MG Tab   • LORazepam (ATIVAN) 0.5 MG Tab   • loratadine (CLARITIN) 10 MG Tab   • lidocaine (LIDODERM) 5 % Patch   • traZODone (DESYREL) 100 MG Tab   • metoprolol SR (TOPROL XL) 50 MG TABLET SR 24 HR   • hydroxychloroquine (PLAQUENIL) 200 MG Tab   • therapeutic multivitamin-minerals (THERAGRAN-M) Tab   • cyanocobalamin (VITAMIN B12) 1000 MCG Tab   • Omega-3 Fatty Acids (FISH OIL) 1000 MG Cap capsule   • cyclobenzaprine (FLEXERIL) 10 MG Tab   • hydroCHLOROthiazide (HYDRODIURIL) 25 MG Tab   • lisinopril (PRINIVIL) 20 MG Tab   • pantoprazole (PROTONIX) 40 MG Tablet Delayed Response   • tamsulosin (FLOMAX) 0.4 MG capsule     No current facility-administered medications for this visit.       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    Head: Denies headaches or dizziness or lightheadedness  EENT: Denies vision changes, vision loss or pain, nasal secretion, nasal bleeding, difficulty swallowing, hearing loss, tinnitus, vertigo, ear  pain  Endocdrinologic: Denies sweating, cold or heat intolerance. No polyuria or polydipsia.   Respiratory: Denies shortness of breath, cough, sputum, or wheezing  Cardiac: Denies chest pain, palpitations, edema or syncope  Gastrointestinal: Denies nausea, vomiting, no abdominal pain or change in bowel habits, no melena or hematochezia  Urinary: Denies dysuria, frequency, hesitancy, or incontinence.  Dermatologic:  Denies new rash  Musculoskeletal: Denies muscle pain or swelling, no atrophy, no neck and back pain or stiffness.   Neurologic: Denies facial droopiness, muscle weakness (focal or generalized), paresthesias, ataxia, change in speech or language, memory loss, abnormal movements, seizures, loss of consciousness, or episodes of confusion.   Psychiatric: Denies anxiety, depression, mood swings, suicidal or homicidal thoughts       Physical examination:   There were no vitals filed for this visit.  General: Patient in no acute distress, pleasant and cooperative.  HEENT: Normocephalic, no signs of acute trauma.   moist conjunctivae. Nares are patent. Oropharynx clear without lesions and normal  hard and soft palates.   Neck: Supple. There is normal range of motion.   Resp: clear to auscultation bilaterally. No wheezes or crackles.   CV: RRR, no murmurs.   Abdomen: normoactive bowel sounds, soft, non distended or tender.   Skin: no signs of acute rashes or trauma.   Musculoskeletal: joints exhibit full range of motion, without any pain to palpation. There are no signs of joint or muscle swelling. There is no tenderness to deep palpation of muscles.   Psychiatric: No hallucinatory behavior. No symptoms of depression or suicidal ideation. Mood and affect appear normal on exam.     NEUROLOGICAL EXAM:   Mental status, orientation: Awake, alert and fully oriented.   Speech and language: speech is clear and fluent. The patient is able to name, repeat and comprehend.   Memory: There is intact recollection of recent and  remote events.   Cranial nerve exam:   CN I: Not examined   CN II: PERRL. Fundoscopic exam was unremarkable.  CN III, IV, VI: EOMI; no nystagmus   CN V: Facial sensation intact bilaterally   CN VII: face symmetric   CN VIII: hearing intact to finger rub bilaterally   CN IX, X: palate elevates symmetrically   CN XI: Symmetric shoulder shrug  CN XII: tongue midline. No signs of tongue biting or fasciculations   Motor exam: Strength is 5/5 in all extremities. Tone is normal. No abnormal movements were seen on exam.   Sensory exam reveals normal sense of light touch, proprioception, vibration and pinprick in all extremities.   Deep tendon reflexes:  2+ throughout. Plantar responses are flexor. There is no clonus.   Coordination: shows a normal finger-nose-finger. Normal rapidly alternating movements.   Gait: The patient was able to get up from seated position on first attempt without requiring assistance. Found to be steady when walking. Movements were fluid with normal arm swing. The patient was able to turn without difficulties or tendency to fall. Romberg exam ***      ANCILLARY DATA REVIEWED:       Lab Data Review:  Reviewed in chart. No results found for this or any previous visit (from the past 24 hour(s)).      Records reviewed:   Reviewed in chart.    Imaging:   MRI brain 8/3/21  1.  No acute abnormality.  2.  A small area of encephalomalacia in the left frontal lobe.  3.  Mild cerebral atrophy.  4.  Mild chronic microvascular ischemic disease.     [facility] EEG 11/3/21  This is a  normal 23 hours ambulatory electroencephalogram recording in the awake, drowsy and sleep state.  No events were captured during the study. Clinical correlation is recommended.        ASSESSMENT AND PLAN:    There are no diagnoses linked to this encounter.        CLINICAL DISCUSSION:          FOLLOW-UP:   No follow-ups on file.      EDUCATION AND COUNSELING:  -Education was provided to the patient and/or family regarding diagnosis and  prognosis. The chronic and unpredictable nature of the condition were discussed. There is increased risk for additional events, which may carry potential for significant injuries and death. Discussed frequent seizure triggers: sleep deprivation, medication non-compliance, use of illegal drugs/alcohol, stress, and others.   - There are potential side effects of antiepileptics including but no limited to: hypersensitivity reactions (rash and others, some of which can be fatal), visual field changes (some of which may be irreversible), glaucoma, diplopia, kidney stones, osteopenia/osteoporosis/bone fractures, hyperthermia/anhydrosis, hyponatremia, tremors/abnormal movements, ataxia, dizziness, fatigue, increased risk for falls, risk for cardiac arrhythmias/syncope, gastrointestinal side effects(hepatitis, pancreatitis, gastritis, ulcers), gingival hypertrophy/bleeding, drowsiness, sedation, anxiety/nervousness, increased risk for suicide, increased risk for depression, and psychosis.   -There are potential effects of seizures, epilepsy, and medications during pregnancy, including but not limited to fetal malformations, child developmental/intellectual disability, fetal/ risk for hemorrhages, stillbirth, maternal death, premature birth, and others. The patient/family aware that pregnancy should be avoided, unless desired, in which case we recommend discussing with us at least a year prior to planned conception. To avoid undesired pregnancy while on antiepileptics, we recommend dual contraception.   -Folic acid 2 mg is recommended for all females in childbearing age (12-44 years of age).   -Recommend chronic vitamin D supplementation and regular exercise (if not contraindicated).   -Patient/family educated on risk for SUDEP (Sudden Death in Epilepsy). Counseling was provided on the importance of strict medication and follow up compliance. The patient/family understand the risks associated with non-adherence with  the medical plan as outlined, including but not limited to an increased risk for breakthrough seizures, which may contribute to injuries, disability, status epilepticus, and even death.   -There are potential effects of alcohol and other drugs, which may lower seizure threshold and/or affect the metabolism of antiepileptic drugs. We recommend avoidance of alcohol and illegal drugs.  -Avoid sleep deprivation.   -The patient is encourage to report to the Division of Motor Vehicles of any condition and/or spells related to confusion, disorientation, and/or loss of awareness and/or loss of consciousness; as these may pose a safety issue if they occur while operating a motor vehicle. The patient and/or family are ultimately responsible for exercising caution and abiding to regulations in place.   -Other seizure precautions were discussed at length, including no diving, no skydiving, no climbing or exposure to unprotected heights, no unsupervised swimming, no Jacuzzi or bathing in bathtubs or deep bodies of water. There are risks for operating any machinery while suffering from seizures / syncope / epilepsy and/or while taking antiepileptic drugs.   -The patient understands and agrees that due to the complexity of his/her diagnosis, results of any testing and further recommendations will typically be discussed/made during a face to face encounter in my office. The patient and/or family further understands it is their responsibility to keep proper follow up.     Patient/family agree with plan, as outlined.         Alla Batista, MSN, APRN, FNP-C  [facility]  Office: [phone]  Fax: [phone]

## 2022-05-31 ENCOUNTER — PHARMACY VISIT (OUTPATIENT)
Dept: PHARMACY | Facility: MEDICAL CENTER | Age: 77
End: 2022-05-31
Payer: MEDICARE

## 2022-05-31 PROCEDURE — RXMED WILLOW AMBULATORY MEDICATION CHARGE: Performed by: INTERNAL MEDICINE

## 2022-05-31 RX ORDER — CX-024414 0.2 MG/ML
INJECTION, SUSPENSION INTRAMUSCULAR
Qty: 0.25 ML | Refills: 0 | Status: SHIPPED | OUTPATIENT
Start: 2022-05-31 | End: 2024-01-04

## 2022-06-06 ENCOUNTER — HOSPITAL ENCOUNTER (OUTPATIENT)
Dept: LAB | Facility: MEDICAL CENTER | Age: 77
End: 2022-06-06
Attending: FAMILY MEDICINE
Payer: MEDICARE

## 2022-06-06 LAB
ANION GAP SERPL CALC-SCNC: 11 MMOL/L (ref 7–16)
BASOPHILS # BLD AUTO: 0.7 % (ref 0–1.8)
BASOPHILS # BLD: 0.05 K/UL (ref 0–0.12)
BUN SERPL-MCNC: 13 MG/DL (ref 8–22)
CALCIUM SERPL-MCNC: 9.3 MG/DL (ref 8.5–10.5)
CHLORIDE SERPL-SCNC: 98 MMOL/L (ref 96–112)
CO2 SERPL-SCNC: 25 MMOL/L (ref 20–33)
CREAT SERPL-MCNC: 0.79 MG/DL (ref 0.5–1.4)
EOSINOPHIL # BLD AUTO: 0.48 K/UL (ref 0–0.51)
EOSINOPHIL NFR BLD: 6.5 % (ref 0–6.9)
ERYTHROCYTE [DISTWIDTH] IN BLOOD BY AUTOMATED COUNT: 46.2 FL (ref 35.9–50)
EST. AVERAGE GLUCOSE BLD GHB EST-MCNC: 111 MG/DL
GFR SERPLBLD CREATININE-BSD FMLA CKD-EPI: 91 ML/MIN/1.73 M 2
GLUCOSE SERPL-MCNC: 93 MG/DL (ref 65–99)
HBA1C MFR BLD: 5.5 % (ref 4–5.6)
HCT VFR BLD AUTO: 42.3 % (ref 42–52)
HGB BLD-MCNC: 14.3 G/DL (ref 14–18)
IMM GRANULOCYTES # BLD AUTO: 0.03 K/UL (ref 0–0.11)
IMM GRANULOCYTES NFR BLD AUTO: 0.4 % (ref 0–0.9)
LYMPHOCYTES # BLD AUTO: 1.66 K/UL (ref 1–4.8)
LYMPHOCYTES NFR BLD: 22.6 % (ref 22–41)
MCH RBC QN AUTO: 32.8 PG (ref 27–33)
MCHC RBC AUTO-ENTMCNC: 33.8 G/DL (ref 33.7–35.3)
MCV RBC AUTO: 97 FL (ref 81.4–97.8)
MONOCYTES # BLD AUTO: 0.91 K/UL (ref 0–0.85)
MONOCYTES NFR BLD AUTO: 12.4 % (ref 0–13.4)
NEUTROPHILS # BLD AUTO: 4.22 K/UL (ref 1.82–7.42)
NEUTROPHILS NFR BLD: 57.4 % (ref 44–72)
NRBC # BLD AUTO: 0 K/UL
NRBC BLD-RTO: 0 /100 WBC
PLATELET # BLD AUTO: 220 K/UL (ref 164–446)
PMV BLD AUTO: 10.7 FL (ref 9–12.9)
POTASSIUM SERPL-SCNC: 4.1 MMOL/L (ref 3.6–5.5)
RBC # BLD AUTO: 4.36 M/UL (ref 4.7–6.1)
SODIUM SERPL-SCNC: 134 MMOL/L (ref 135–145)
WBC # BLD AUTO: 7.4 K/UL (ref 4.8–10.8)

## 2022-06-06 PROCEDURE — 85025 COMPLETE CBC W/AUTO DIFF WBC: CPT

## 2022-06-06 PROCEDURE — 83036 HEMOGLOBIN GLYCOSYLATED A1C: CPT | Mod: GA

## 2022-06-06 PROCEDURE — 36415 COLL VENOUS BLD VENIPUNCTURE: CPT

## 2022-06-06 PROCEDURE — 80048 BASIC METABOLIC PNL TOTAL CA: CPT

## 2022-06-08 ENCOUNTER — TELEPHONE (OUTPATIENT)
Dept: CARDIOLOGY | Facility: MEDICAL CENTER | Age: 77
End: 2022-06-08
Payer: MEDICARE

## 2022-06-08 NOTE — TELEPHONE ENCOUNTER
Received fax from    [facility]    FAX:  [phone]  TELE:  [phone]        Requesting cardiac clearance for upcoming    AQUABLATION FOR PROSTATE HYPERTROPHY        Scheduled on:  NONE LISTED      Medication hold   ELIQUIS _____   days prior to procedure.        To LENI

## 2022-06-09 NOTE — TELEPHONE ENCOUNTER
Juice Davis M.D.  You 1 hour ago (8:21 AM)         Sounds good. Continue doac until day prior to procedure. If this is too close for surgeon can consider holding doac longer and bridge with lovenox which has reversal agent more available. Thank you!    Message text       ---------------------------------------  Letter drafted and faxed [phone]  Confirmation status received  Scan to [facility]

## 2022-06-14 ENCOUNTER — APPOINTMENT (OUTPATIENT)
Dept: NEUROLOGY | Facility: MEDICAL CENTER | Age: 77
End: 2022-06-14
Attending: NURSE PRACTITIONER
Payer: MEDICARE

## 2022-06-20 ENCOUNTER — APPOINTMENT (OUTPATIENT)
Dept: NEUROLOGY | Facility: MEDICAL CENTER | Age: 77
End: 2022-06-20
Attending: NURSE PRACTITIONER
Payer: MEDICARE

## 2022-06-22 ENCOUNTER — TELEPHONE (OUTPATIENT)
Dept: CARDIOLOGY | Facility: MEDICAL CENTER | Age: 77
End: 2022-06-22
Payer: MEDICARE

## 2022-06-22 NOTE — TELEPHONE ENCOUNTER
LENI    Caller: Carl Blount    Topic/issue: QUESTIONS     Patient has some questions regarding information that was relayed over to his urologist. Please advise.    Thank you,  Ellis GALINDO    Callback Number: [phone] (home)

## 2022-06-23 ENCOUNTER — TELEPHONE (OUTPATIENT)
Dept: CARDIOLOGY | Facility: MEDICAL CENTER | Age: 77
End: 2022-06-23
Payer: MEDICARE

## 2022-06-23 NOTE — TELEPHONE ENCOUNTER
Called pt [phone]  Pt asking about holding eliquis vs Lovenox injections. Advised pt to call [facility] to ask surgeon how they would like to proceed with medications. Pt verbalized understanding.

## 2022-06-23 NOTE — TELEPHONE ENCOUNTER
Received fax from    [facility], ADITHYA HIGUERA DDS      FAX:  [phone]   (f:) (t:) requesting:  TELE:  [phone]      Requesting cardiac clearance for upcoming     OUTPATIENT DENTAL SURGERY      Scheduled on:  NONE LISTED      Medication hold   ELIQUIS ______   days prior to procedure.        To LENI

## 2022-06-29 NOTE — TELEPHONE ENCOUNTER
Juice Davis M.D.  You 22 hours ago (4:28 PM)         Sounds good. Chadsvasc >5 with history prior stroke so continue doac until day prior to surgery and resume when hemostasis achieved. Thanks Elena!    Message text       -------------------------------------  Letter drafted and faxed to [phone]  Confirmation status received  Scan to [facility]

## 2022-07-01 ENCOUNTER — HOSPITAL ENCOUNTER (OUTPATIENT)
Facility: MEDICAL CENTER | Age: 77
End: 2022-07-01
Attending: UROLOGY
Payer: MEDICARE

## 2022-07-01 PROCEDURE — 87186 SC STD MICRODIL/AGAR DIL: CPT

## 2022-07-01 PROCEDURE — 87077 CULTURE AEROBIC IDENTIFY: CPT

## 2022-07-01 PROCEDURE — 87086 URINE CULTURE/COLONY COUNT: CPT

## 2022-07-05 LAB
BACTERIA UR CULT: ABNORMAL
BACTERIA UR CULT: ABNORMAL
SIGNIFICANT IND 70042: ABNORMAL
SITE SITE: ABNORMAL
SOURCE SOURCE: ABNORMAL

## 2022-07-16 PROCEDURE — 99284 EMERGENCY DEPT VISIT MOD MDM: CPT

## 2022-07-17 ENCOUNTER — HOSPITAL ENCOUNTER (EMERGENCY)
Facility: MEDICAL CENTER | Age: 77
End: 2022-07-17
Attending: EMERGENCY MEDICINE
Payer: MEDICARE

## 2022-07-17 VITALS
TEMPERATURE: 97.5 F | HEART RATE: 79 BPM | RESPIRATION RATE: 16 BRPM | SYSTOLIC BLOOD PRESSURE: 130 MMHG | OXYGEN SATURATION: 97 % | HEIGHT: 73 IN | BODY MASS INDEX: 30.45 KG/M2 | DIASTOLIC BLOOD PRESSURE: 61 MMHG

## 2022-07-17 DIAGNOSIS — R31.0 GROSS HEMATURIA: ICD-10-CM

## 2022-07-17 ASSESSMENT — LIFESTYLE VARIABLES: DO YOU DRINK ALCOHOL: NO

## 2022-07-17 NOTE — ED NOTES
Pt walking around in room and in mai stating he wants to go now, explained to pt that the doctor needs to see him and put in discharge orders, pt upset because he wants to leave right now.

## 2022-07-17 NOTE — ED NOTES
Pt dc'd, reviewed dc instructions, pt verbalized understanding, given copy of instructions,  pt taken to exit in wheelchair per request, is ambulatory with steady gait, resp even and unlabored, urine in bag is clear with pink tinge, in NAD at time of DC.  Accompanied by wife.

## 2022-07-17 NOTE — ED NOTES
Manually irrigated olmedo cath with sterile water, several small blood clots were removed, irrigated til clear, pt tolerated well.

## 2022-07-17 NOTE — DISCHARGE INSTRUCTIONS
You were seen in the emergency department for blood in the urine after a TURP.  Your physical exam and vital signs are reassuring.  This is most likely irritation from the catheter causing the bleeding combined with your blood thinners.  At this time, there does not appear to be any dangerous condition.    Please follow-up closely with your urologist.    Please return to the emergency department or seek medical attention if you develop:  Inability to pass urine, excessive clots, uncontrolled bleeding, fevers, flank pain, any other new or concerning findings.

## 2022-07-17 NOTE — ED TRIAGE NOTES
Carl Blount  77 y.o.  male  Chief Complaint   Patient presents with   • Blood in Urine     Pt had TURP at [place] on July 14. Gross hematuria started tonight. No abdominal pain. Called surgeon, who pt states told them some bleeding is normal. However, bleeding got worse & started to leak around catheter. On Eliquis.   • Post-Op Complications       Patient educated on triage process, to alert staff if any changes in condition.    BIB [facility]. Patient educated on triage process, to alert staff if any changes in condition.

## 2022-07-17 NOTE — ED PROVIDER NOTES
ED Provider Note    Scribed for Misael Meade M.D. by Dulce Maria Roa. 7/17/2022,  1:21 AM.    Means of Arrival: EMS  History obtained from: Patient   History limited by: None     CHIEF COMPLAINT  Chief Complaint   Patient presents with    Blood in Urine     Pt had TURP at [place] on July 14. Gross hematuria started tonight. No abdominal pain. Called surgeon, who pt states told them some bleeding is normal. However, bleeding got worse & started to leak around catheter. On Eliquis.    Post-Op Complications       HPI  Carl Blount is a 77 y.o. male who presents to the Emergency Department via EMS for moderate hematuria onset yesterday. The patient has a history of a stroke. He notes a surgical history of a TURP 7/14/22. He states that his  ment had gotten loose earlier today and they attempted to replace it. He states that he called Dr. Calabrese (Urologist) and was informed that the bleeding was normal. However, the patient states that the bleeding has worsened prompting him to visit the ED. He states that the blood has been clear and denies blood clots. He denies symptoms of abdominal pain, kidney pain, or fever. He reports he is currently on Eliquis.      REVIEW OF SYSTEMS  CONSTITUTIONAL:  No fever.  CARDIOVASCULAR:  No chest discomfort.  RESPIRATORY:  No pleuritic chest pain.  GASTROINTESTINAL:  No abdominal pain, kidney pain   GENITOURINARY:   hematuria, no dysuria  See HPI for further details.      PAST MEDICAL HISTORY  Past Medical History:   Diagnosis Date    Afib (HCC)     Atrial fibrillation (HCC)     Hyperlipidemia     Hypertension     Stroke (HCC)        FAMILY HISTORY  None noted.     SOCIAL HISTORY   reports that he has quit smoking. He has never used smokeless tobacco. He reports current alcohol use. He reports that he does not use drugs.    SURGICAL HISTORY  No past surgical history on file.    CURRENT MEDICATIONS  Home Medications       Reviewed by Bella Rocha R.N.  "(Registered Nurse) on 07/16/22 at 2309  Med List Status: Not Addressed     Medication Last Dose Status   apixaban (ELIQUIS) 5mg Tab  Active   atorvastatin (LIPITOR) 80 MG tablet  Active   COVID-19 mRNA vaccine, Moderna, (MODERNA COVID-19 VACCINE) 100 MCG/0.5ML Suspension injection  Active   cyanocobalamin (VITAMIN B12) 1000 MCG Tab  Active   cyclobenzaprine (FLEXERIL) 10 MG Tab  Active   finasteride (PROSCAR) 5 MG Tab  Active   hydroCHLOROthiazide (HYDRODIURIL) 25 MG Tab  Active   hydroxychloroquine (PLAQUENIL) 200 MG Tab  Active   lidocaine (LIDODERM) 5 % Patch  Active   lisinopril (PRINIVIL) 20 MG Tab  Active   loratadine (CLARITIN) 10 MG Tab  Active   LORazepam (ATIVAN) 0.5 MG Tab  Active   metoprolol SR (TOPROL XL) 50 MG TABLET SR 24 HR  Active   Omega-3 Fatty Acids (FISH OIL) 1000 MG Cap capsule  Active   pantoprazole (PROTONIX) 40 MG Tablet Delayed Response  Active   tamsulosin (FLOMAX) 0.4 MG capsule  Active   therapeutic multivitamin-minerals (THERAGRAN-M) Tab  Active   traZODone (DESYREL) 100 MG Tab  Active                  ALLERGIES  Allergies   Allergen Reactions    Amlodipine Unspecified     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   Neck: trachea midline  Resp: no respiratory distress  CV: No JVD  Abd: Abdomen soft, non tender.   Back: No CVA tenderness  Genitourinary: Full catheter placed, some occasional clotting in bag and blood tinge to urine in the tubing   Ext: No deformities  Psych: normal mood  Neuro: speech fluent     DIAGNOSTIC STUDIES / PROCEDURES     COURSE & MEDICAL DECISION MAKING  Pertinent Labs & Imaging studies reviewed. (See chart for details)    1:21 AM Patient seen and examined at bedside. I discussed with the patient that he is " seeing increased bleeding due to his use of the blood thinner Eliquis. The patient was given the opportunity to ask questions at this time. I informed the patient that the nurse will flush and irrigate his catheter to clear it out. Patient verbalizes understanding and agreement to this plan of care.     3:26 AM - Patient was reevaluated at bedside. Discussed the irrigation with the patient. I informed the patient that they are welcome to stay if they have any further questions or they can be discharged. The patient was given the opportunity to ask questions at this time. I discussed plan for discharge and follow up as outlined below. The patient is stable for discharge at this time and will return for any new or worsening symptoms. Patient verbalizes understanding and support with my plan for discharge.     Medical Decision Making:  Patient presents with blood in the urine as well as blood leaking around the catheter site after a recent TURP procedure and is on blood thinners.  No signs of pyelonephritis, or infection.  Patient is afebrile.  Benign abdominal examination.  No evidence of urinary obstruction.  The Avila catheter was flushed, and there were no difficulties or obstructions.  We discussed various options with the patient including consulting urology for recommendations, however the patient has been in touch with urology and declines this.  The patient prefers to go home at this time.  I suspect that this is most likely bleeding from his postsurgical state as well as irritation of the bladder from the balloon.  At arrival the patient's Avila catheter is leg anchor was not attached, likely contributing to the irritation.    The patient will return for new or worsening symptoms and is stable at the time of discharge.    The patient is referred to a primary physician for diabetic screening, and for all other preventative health concerns.    DISPOSITION:  Patient will be discharged home in stable  condition.    FOLLOW UP:  Gabriel Calabrese M.D.  [address]  [place] [address]  [phone]    Call       [facility], Emergency Dept  [address]  [place] [address] [phone]    If symptoms worsen      FINAL IMPRESSION  1. Gross hematuria       Dulce Maria WEST (Scribe), am scribing for, and in the presence of, Misael Meade M.D..    Electronically signed by: Dulce Maria Roa (Scribe), 7/17/2022    IMisael M.D. personally performed the services described in this documentation, as scribed by Dulce Maria Roa in my presence, and it is both accurate and complete.    The note accurately reflects work and decisions made by me.  Misael Meade M.D.  7/17/2022  6:36 AM      This dictation was created using voice recognition software. The accuracy of the dictation is limited to the abilities of the software. I expect there may be some errors of grammar and possibly content. The nursing notes were reviewed and certain aspects of this information were incorporated into this note.

## 2022-07-17 NOTE — ED NOTES
Pt arrived to [facility] from triage via wheelchair, able to transfer to stretcher independently, awake and alert, pt with olmedo bag with blood tinged urine present, some blood leaking from around catheter site, does not appear to be in any distress, skin pink, warm dry, resp even and unlabored, wife at bedside who is very anxious about blood, offered emotional support,  awaiting MD evaluation.

## 2022-07-26 ENCOUNTER — HOSPITAL ENCOUNTER (OUTPATIENT)
Facility: MEDICAL CENTER | Age: 77
End: 2022-07-26
Attending: UROLOGY
Payer: MEDICARE

## 2022-07-26 PROCEDURE — 87077 CULTURE AEROBIC IDENTIFY: CPT

## 2022-07-26 PROCEDURE — 87086 URINE CULTURE/COLONY COUNT: CPT

## 2022-07-26 PROCEDURE — 87186 SC STD MICRODIL/AGAR DIL: CPT

## 2022-08-01 ENCOUNTER — DOCUMENTATION (OUTPATIENT)
Dept: VASCULAR LAB | Facility: MEDICAL CENTER | Age: 77
End: 2022-08-01
Payer: MEDICARE

## 2022-08-01 ENCOUNTER — APPOINTMENT (OUTPATIENT)
Dept: VASCULAR LAB | Facility: MEDICAL CENTER | Age: 77
End: 2022-08-01
Attending: INTERNAL MEDICINE
Payer: MEDICARE

## 2022-08-02 NOTE — PROGRESS NOTES
Pt cancelled his Eliquis f/u due to a conflicting appt. Wants to hold off on rescheduling for now but call us back to reschedule.    Tosha BONNER

## 2022-08-09 ENCOUNTER — HOSPITAL ENCOUNTER (OUTPATIENT)
Facility: MEDICAL CENTER | Age: 77
End: 2022-08-09
Attending: PHYSICIAN ASSISTANT
Payer: MEDICARE

## 2022-08-09 PROCEDURE — 87186 SC STD MICRODIL/AGAR DIL: CPT

## 2022-08-09 PROCEDURE — 87077 CULTURE AEROBIC IDENTIFY: CPT

## 2022-08-09 PROCEDURE — 87086 URINE CULTURE/COLONY COUNT: CPT

## 2022-08-13 LAB
BACTERIA UR CULT: ABNORMAL
SIGNIFICANT IND 70042: ABNORMAL
SITE SITE: ABNORMAL
SOURCE SOURCE: ABNORMAL

## 2022-10-11 ENCOUNTER — PHARMACY VISIT (OUTPATIENT)
Dept: PHARMACY | Facility: MEDICAL CENTER | Age: 77
End: 2022-10-11
Payer: COMMERCIAL

## 2022-10-11 PROCEDURE — RXMED WILLOW AMBULATORY MEDICATION CHARGE: Performed by: INTERNAL MEDICINE

## 2022-10-18 ENCOUNTER — APPOINTMENT (OUTPATIENT)
Dept: NEUROLOGY | Facility: MEDICAL CENTER | Age: 77
End: 2022-10-18
Attending: NURSE PRACTITIONER
Payer: MEDICARE

## 2022-11-10 ENCOUNTER — PATIENT MESSAGE (OUTPATIENT)
Dept: HEALTH INFORMATION MANAGEMENT | Facility: OTHER | Age: 77
End: 2022-11-10

## 2023-02-23 NOTE — PROGRESS NOTES
"No chief complaint on file.         Subjective:   Carl \"Marcin\" Jose Alejandro is a 78 y.o. male who presents today for follow-up.    Patient of Dr. Davis.  Current medical problems include coronary artery disease with PCI to RCA in 2018, cardiomyopathy with mildly reduced EF of 45%, left carotid endarterectomy, ischemic CVA, paroxysmal atrial fibrillation found on loop recorder, hyperlipidemia, hypertension..      Past Medical History:   Diagnosis Date    Afib (HCC)     Atrial fibrillation (HCC)     Hyperlipidemia     Hypertension     Stroke (HCC)          No past surgical history on file.      No family history on file.      Social History     Tobacco Use   Smoking Status Former   Smokeless Tobacco Never          Social History     Substance and Sexual Activity   Alcohol Use Yes    Comment: 2 drinks/night         Allergies   Allergen Reactions    Amlodipine Unspecified     Gingival hyperplasia  08/09/2021 per pt no allergies         Outpatient Encounter Medications as of 2/24/2023   Medication Sig Dispense Refill    COVID-19mRNA Bival Vac Moderna (MODERNA COVID-19 BIVALENT) 50 MCG/0.5ML Suspension injection Inject  into the shoulder, thigh, or buttocks. 0.5 mL 0    COVID-19 mRNA vaccine, Moderna, (MODERNA COVID-19 VACCINE) 100 MCG/0.5ML Suspension injection Inject  into the shoulder, thigh, or buttocks. 0.25 mL 0    apixaban (ELIQUIS) 5mg Tab Take 1 Tablet by mouth 2 times a day. 180 Tablet 1    atorvastatin (LIPITOR) 80 MG tablet Take 0.5 Tablets by mouth at bedtime. 90 Tablet 3    finasteride (PROSCAR) 5 MG Tab Take 5 mg by mouth every day.      LORazepam (ATIVAN) 0.5 MG Tab Take 0.5 mg by mouth 3 times a day as needed for Anxiety.      loratadine (CLARITIN) 10 MG Tab Take 10 mg by mouth every day.      lidocaine (LIDODERM) 5 % Patch Place 1 Patch on the skin 1 time a day as needed.      traZODone (DESYREL) 100 MG Tab Take 100 mg by mouth every evening.      metoprolol SR (TOPROL XL) 50 MG TABLET SR 24 HR Take 1 " Tablet by mouth every day. 90 Tablet 3    hydroxychloroquine (PLAQUENIL) 200 MG Tab Take 200 mg by mouth 2 times a day.      therapeutic multivitamin-minerals (THERAGRAN-M) Tab Take 1 tablet by mouth every day.      cyanocobalamin (VITAMIN B12) 1000 MCG Tab Take 1,000 mcg by mouth every day.      Omega-3 Fatty Acids (FISH OIL) 1000 MG Cap capsule Take 1,000 mg by mouth every day.      cyclobenzaprine (FLEXERIL) 10 MG Tab Take 10 mg by mouth 3 times a day as needed for Muscle Spasms.      hydroCHLOROthiazide (HYDRODIURIL) 25 MG Tab Take 25 mg by mouth every day.      lisinopril (PRINIVIL) 20 MG Tab Take 20 mg by mouth every day.      pantoprazole (PROTONIX) 40 MG Tablet Delayed Response Take 40 mg by mouth every day.      tamsulosin (FLOMAX) 0.4 MG capsule Take 0.4 mg by mouth at bedtime.       No facility-administered encounter medications on file as of 2/24/2023.         ROS       Objective:   There were no vitals taken for this visit.       Physical Exam       Assessment:   No diagnosis found.     Medical Decision Making:  Today's Assessment / Plan:   Ischemic Heart Disease, Stable  Dyslipidemia  Carotid disease s/p CEA  - S/P PCI to RCA 2018  - DAPT:   - high intensity statin:  - beta blocker:  - Cardiac rehab:     Atrial Fibrillation, ***  Chronic Anticoagulation  - [id number]: 6  - Rate control: ***  - Rhythm control: ***      ICM, HFmrEF - NYHA I, stage C - asymptomatic. Continue ACEi and BB. Titrate metoprolol.     CAD / HLD / hx CVA w carotid stenosis - Continue doac and aspirin given multivessel disease. LDL goal <70 and trig <150. Continue statin. Annual lipids.     Paroxysmal AF - chadsvasc 6 - continue xarelto for cva prevention. Continue metoprolol for rate control. If unwilling to continue doac until day prior to procedure will need bridging with lovenox due to elevated risk cva.      HTN - controlled at home. Goal 120/80. Continue current regimen.

## 2023-02-24 ENCOUNTER — APPOINTMENT (OUTPATIENT)
Dept: CARDIOLOGY | Facility: MEDICAL CENTER | Age: 78
End: 2023-02-24
Payer: MEDICARE

## 2023-03-01 ENCOUNTER — APPOINTMENT (OUTPATIENT)
Dept: CARDIOLOGY | Facility: MEDICAL CENTER | Age: 78
End: 2023-03-01
Payer: MEDICARE

## 2023-03-21 ENCOUNTER — OFFICE VISIT (OUTPATIENT)
Dept: VASCULAR SURGERY | Facility: MEDICAL CENTER | Age: 78
End: 2023-03-21
Payer: MEDICARE

## 2023-03-21 VITALS
SYSTOLIC BLOOD PRESSURE: 104 MMHG | TEMPERATURE: 97.6 F | DIASTOLIC BLOOD PRESSURE: 66 MMHG | BODY MASS INDEX: 31.28 KG/M2 | HEIGHT: 73 IN | WEIGHT: 236 LBS | HEART RATE: 87 BPM | OXYGEN SATURATION: 97 %

## 2023-03-21 DIAGNOSIS — I65.21 CAROTID STENOSIS, ASYMPTOMATIC, RIGHT: Primary | ICD-10-CM

## 2023-03-21 PROCEDURE — 99212 OFFICE O/P EST SF 10 MIN: CPT | Performed by: SURGERY

## 2023-03-21 ASSESSMENT — FIBROSIS 4 INDEX: FIB4 SCORE: 2.41

## 2023-03-21 NOTE — PROGRESS NOTES
"                 VASCULAR SURGERY                 Clinic Progress Note  _________________________________    3/21/2023: previously seen for carotid stenosis. CTA 4/25/22 shows moderate right and no significant left ICA stenosis. History of left carotid endarterectomy. No new complaints. No stroke symptoms    Vitals  /66 (BP Location: Left arm, Patient Position: Sitting, BP Cuff Size: Adult)   Pulse 87   Temp 36.4 °C (97.6 °F) (Temporal)   Ht 1.854 m (6' 1\")   Wt 107 kg (236 lb)   SpO2 97%   BMI 31.14 kg/m²     Neuro intact    A/P)  Carotid stenosis, due for annual surveillance, will schedule and then call patient to discuss results.      Baudilio Turpin MD  [facility]  [phone]  [address], [place] [address]  "

## 2023-03-22 ENCOUNTER — TELEPHONE (OUTPATIENT)
Dept: VASCULAR SURGERY | Facility: MEDICAL CENTER | Age: 78
End: 2023-03-22
Payer: MEDICARE

## 2023-03-22 NOTE — TELEPHONE ENCOUNTER
Patient left voicemail to schedule his US that Dr. Turpin ordered yesterday.. I called patient back and transferred to the imaging department. [phone]

## 2023-03-23 ENCOUNTER — TELEPHONE (OUTPATIENT)
Dept: VASCULAR SURGERY | Facility: MEDICAL CENTER | Age: 78
End: 2023-03-23
Payer: MEDICARE

## 2023-03-24 ENCOUNTER — OFFICE VISIT (OUTPATIENT)
Dept: CARDIOLOGY | Facility: MEDICAL CENTER | Age: 78
End: 2023-03-24
Payer: MEDICARE

## 2023-03-24 VITALS
HEIGHT: 73 IN | OXYGEN SATURATION: 97 % | WEIGHT: 236 LBS | SYSTOLIC BLOOD PRESSURE: 134 MMHG | BODY MASS INDEX: 31.28 KG/M2 | DIASTOLIC BLOOD PRESSURE: 46 MMHG | HEART RATE: 64 BPM | RESPIRATION RATE: 16 BRPM

## 2023-03-24 DIAGNOSIS — Z86.73 HISTORY OF CARDIOEMBOLIC CEREBROVASCULAR ACCIDENT (CVA): ICD-10-CM

## 2023-03-24 DIAGNOSIS — E78.2 MIXED HYPERLIPIDEMIA: ICD-10-CM

## 2023-03-24 DIAGNOSIS — I48.11 LONGSTANDING PERSISTENT ATRIAL FIBRILLATION (HCC): ICD-10-CM

## 2023-03-24 DIAGNOSIS — Z98.890 HISTORY OF CAROTID ENDARTERECTOMY: ICD-10-CM

## 2023-03-24 PROCEDURE — 99214 OFFICE O/P EST MOD 30 MIN: CPT | Performed by: PHYSICIAN ASSISTANT

## 2023-03-24 RX ORDER — ATORVASTATIN CALCIUM 40 MG/1
40 TABLET, FILM COATED ORAL EVERY EVENING
Qty: 100 TABLET | Refills: 3 | Status: SHIPPED | OUTPATIENT
Start: 2023-03-24

## 2023-03-24 ASSESSMENT — ENCOUNTER SYMPTOMS
LOSS OF CONSCIOUSNESS: 0
PND: 0
NECK PAIN: 1
DIZZINESS: 0
SHORTNESS OF BREATH: 0
PALPITATIONS: 0
COUGH: 0

## 2023-03-24 ASSESSMENT — FIBROSIS 4 INDEX: FIB4 SCORE: 2.41

## 2023-03-24 NOTE — PROGRESS NOTES
"Chief Complaint   Patient presents with    HTN (Controlled)     Follow up          Subjective:   \"Marcin\" Jose Alejandro is a 78 y.o. male who presents today for follow-up.    Patient of Dr. Davis.  Current medical problems include coronary artery disease with PCI to RCA in 2018, cardiomyopathy with mildly reduced EF of 45%, left carotid endarterectomy, ischemic CVA and TIA, persistent atrial fibrillation found on loop recorder, TIA April 2022 with potential Xarelto failure, hyperlipidemia, hypertension.    Last visit in the office with Dr. Davis January 2022.      Marcin is doing well from a cardiac standpoint.  He does not experience angina, shortness of breath, orthopnea or PND.  He does have mild leg swelling in his left leg.  He follows with Dr. Turpin for his carotid disease and is due for Doppler ultrasound of his right next week.    He is considering going for a colonoscopy.  Wondering if he needs to be bridged.    He complains of right arm pain.  It is shooting pain and can be elicited when he tilts his neck to the left.  He had some imaging done from his orthopedic doctor who diagnosed him with arthritis and gave him a short course of steroids.      Past Medical History:   Diagnosis Date    Afib (HCC)     Atrial fibrillation (HCC)     Hyperlipidemia     Hypertension     Stroke (HCC)          No past surgical history on file.      No family history on file.      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      atorvastatin (LIPITOR) 40 MG Tab Take 1 Tablet by mouth every evening. 100 Tablet 3    apixaban (ELIQUIS) 5mg " Tab Take 1 Tablet by mouth 2 times a day. 180 Tablet 1    finasteride (PROSCAR) 5 MG Tab Take 5 mg by mouth every day.      LORazepam (ATIVAN) 0.5 MG Tab Take 0.5 mg by mouth 3 times a day as needed for Anxiety.      loratadine (CLARITIN) 10 MG Tab Take 10 mg by mouth every day.      lidocaine (LIDODERM) 5 % Patch Place 1 Patch on the skin 1 time a day as needed.      traZODone (DESYREL) 100 MG Tab Take 100 mg by mouth every evening.      metoprolol SR (TOPROL XL) 50 MG TABLET SR 24 HR Take 1 Tablet by mouth every day. 90 Tablet 3    hydroxychloroquine (PLAQUENIL) 200 MG Tab Take 200 mg by mouth 2 times a day.      therapeutic multivitamin-minerals (THERAGRAN-M) Tab Take 1 tablet by mouth every day.      cyanocobalamin (VITAMIN B12) 1000 MCG Tab Take 1,000 mcg by mouth every day.      Omega-3 Fatty Acids (FISH OIL) 1000 MG Cap capsule Take 1,000 mg by mouth every day.      cyclobenzaprine (FLEXERIL) 10 MG Tab Take 10 mg by mouth 3 times a day as needed for Muscle Spasms.      hydroCHLOROthiazide (HYDRODIURIL) 25 MG Tab Take 25 mg by mouth every day.      lisinopril (PRINIVIL) 20 MG Tab Take 20 mg by mouth every day.      pantoprazole (PROTONIX) 40 MG Tablet Delayed Response Take 40 mg by mouth every day.      tamsulosin (FLOMAX) 0.4 MG capsule Take 0.4 mg by mouth at bedtime.      COVID-19mRNA Bival Vac Moderna (MODERNA COVID-19 BIVALENT) 50 MCG/0.5ML Suspension injection Inject  into the shoulder, thigh, or buttocks. 0.5 mL 0    COVID-19 mRNA vaccine, Moderna, (MODERNA COVID-19 VACCINE) 100 MCG/0.5ML Suspension injection Inject  into the shoulder, thigh, or buttocks. 0.25 mL 0    [DISCONTINUED] atorvastatin (LIPITOR) 80 MG tablet Take 0.5 Tablets by mouth at bedtime. 90 Tablet 3     No facility-administered encounter medications on file as of 3/24/2023.         Review of Systems   Constitutional:  Negative for malaise/fatigue.   Respiratory:  Negative for cough and shortness of breath.    Cardiovascular:  Positive  "for leg swelling. Negative for chest pain, palpitations and PND.   Musculoskeletal:  Positive for neck pain.   Neurological:  Negative for dizziness and loss of consciousness.        Objective:   /46 (BP Location: Left arm, Patient Position: Sitting)   Pulse 64   Resp 16   Ht 1.854 m (6' 1\")   Wt 107 kg (236 lb)   SpO2 97%   BMI 31.14 kg/m²        Physical Exam  Vitals reviewed.   HENT:      Head: Normocephalic and atraumatic.   Neck:      Comments: JVP 5 cm in supine position  Cardiovascular:      Rate and Rhythm: Normal rate. Rhythm irregular.      Heart sounds: No murmur heard.  Pulmonary:      Effort: Pulmonary effort is normal.      Breath sounds: No rales.   Abdominal:      General: There is no distension.   Musculoskeletal:      Right lower leg: No edema.      Left lower leg: Edema present.   Skin:     General: Skin is warm and dry.   Neurological:      Mental Status: He is alert.   Psychiatric:         Judgment: Judgment normal.          Assessment:     1. History of carotid endarterectomy        2. Longstanding persistent atrial fibrillation (HCC)        3. Mixed hyperlipidemia  atorvastatin (LIPITOR) 40 MG Tab      4. History of cardioembolic cerebrovascular accident (CVA)             Medical Decision Making:  Today's Assessment / Plan:   Ischemic Heart Disease, Stable  Dyslipidemia  Carotid disease s/p CEA  - S/P PCI to RCA 2018  -On DOAC  - high intensity statin: Atorvastatin 40 mg.  LDL well-controlled  - beta blocker: Metoprolol SR 50    Cardiomyopathy, ischemic?  - BB, ACE. No HF symptoms.     Atrial Fibrillation, paroxysmal   - Rate control: metoprolol 50  -Marcin thinks he is in A-fib all the time although his to past EKGs have been sinus rhythm.    Chronic Anticoagulation  - [id number]: 6.  He has not had a CVA or TIA in the last 3 months.  He is at high risk for thromboembolism with a [id number] of 6.  He has also had bleeding complications from colonoscopy that required blood " transfusion.  - For procedures such as a colonoscopy I think it is reasonable to hold Eliquis 24 hours before procedure.  Take Eliquis 5 mg in am day before before the procedure, skip the evening dose, skip am dose day of procedure then restart after procedure.    -The risks of holding the medication versus risk of Lovenox bridging were discussed with Marcin and his wife.      RTC in 6mo. Sooner if needed

## 2023-04-06 ENCOUNTER — HOSPITAL ENCOUNTER (OUTPATIENT)
Dept: RADIOLOGY | Facility: MEDICAL CENTER | Age: 78
End: 2023-04-06
Attending: SURGERY
Payer: MEDICARE

## 2023-04-06 DIAGNOSIS — I65.21 CAROTID STENOSIS, ASYMPTOMATIC, RIGHT: ICD-10-CM

## 2023-04-06 PROCEDURE — 93880 EXTRACRANIAL BILAT STUDY: CPT

## 2023-04-06 PROCEDURE — 93880 EXTRACRANIAL BILAT STUDY: CPT | Mod: 26 | Performed by: INTERNAL MEDICINE

## 2023-04-13 ENCOUNTER — OFFICE VISIT (OUTPATIENT)
Dept: VASCULAR SURGERY | Facility: MEDICAL CENTER | Age: 78
End: 2023-04-13
Payer: MEDICARE

## 2023-04-13 VITALS
HEART RATE: 63 BPM | DIASTOLIC BLOOD PRESSURE: 72 MMHG | TEMPERATURE: 97.3 F | SYSTOLIC BLOOD PRESSURE: 126 MMHG | OXYGEN SATURATION: 96 % | BODY MASS INDEX: 31.14 KG/M2 | WEIGHT: 236 LBS

## 2023-04-13 DIAGNOSIS — I65.21 CAROTID STENOSIS, ASYMPTOMATIC, RIGHT: ICD-10-CM

## 2023-04-13 PROCEDURE — 99212 OFFICE O/P EST SF 10 MIN: CPT | Performed by: SURGERY

## 2023-04-13 ASSESSMENT — FIBROSIS 4 INDEX: FIB4 SCORE: 2.41

## 2023-04-13 NOTE — PROGRESS NOTES
VASCULAR SURGERY                 Clinic Progress Note  _________________________________    4/13/2023 presents for carotid surveillance. No concerning symptoms. Duplex shows moderate RUTH stenosis and no significant left carotid stenosis    Vitals  /72 (BP Location: Right arm, Patient Position: Sitting, BP Cuff Size: Large adult)   Pulse 63   Temp 36.3 °C (97.3 °F) (Temporal)   Wt 107 kg (236 lb)   SpO2 96%   BMI 31.14 kg/m²     Neuro intact    A/P)  Continue annual surveillance  ASA 81 daily and eliquis (for Afib)  Patient advised to report to ER if any stroke symptoms occur        Baudilio Turpin MD  [facility]  [phone]  [address], [place] [address]

## 2023-04-18 ENCOUNTER — HOSPITAL ENCOUNTER (OUTPATIENT)
Dept: LAB | Facility: MEDICAL CENTER | Age: 78
End: 2023-04-18
Attending: FAMILY MEDICINE
Payer: MEDICARE

## 2023-04-18 LAB
25(OH)D3 SERPL-MCNC: 41 NG/ML (ref 30–100)
EST. AVERAGE GLUCOSE BLD GHB EST-MCNC: 123 MG/DL
GLUCOSE SERPL-MCNC: 168 MG/DL (ref 65–99)
HBA1C MFR BLD: 5.9 % (ref 4–5.6)

## 2023-04-18 PROCEDURE — 36415 COLL VENOUS BLD VENIPUNCTURE: CPT | Mod: GA

## 2023-04-18 PROCEDURE — 82947 ASSAY GLUCOSE BLOOD QUANT: CPT

## 2023-04-18 PROCEDURE — 83036 HEMOGLOBIN GLYCOSYLATED A1C: CPT | Mod: GA

## 2023-04-18 PROCEDURE — 82306 VITAMIN D 25 HYDROXY: CPT

## 2023-05-19 ENCOUNTER — TELEPHONE (OUTPATIENT)
Dept: CARDIOLOGY | Facility: MEDICAL CENTER | Age: 78
End: 2023-05-19
Payer: MEDICARE

## 2023-05-19 NOTE — TELEPHONE ENCOUNTER
ALEXI    Caller: Carl Blount     Topic/issue: Patient believes one of his medication is causing bruising and would like a call back to discuss.     Callback Number: [phone]      Thank you   Norma HOLGUIN

## 2023-05-23 ENCOUNTER — TELEPHONE (OUTPATIENT)
Dept: CARDIOLOGY | Facility: MEDICAL CENTER | Age: 78
End: 2023-05-23
Payer: MEDICARE

## 2023-05-23 NOTE — TELEPHONE ENCOUNTER
Last OV: 3/24/23  Proposed Surgery: Colonoscopy  Surgery Date: 6/23/23  Requesting Office Name: [facility]  Fax Number: [phone]  Preference of Location (default is surgery center unless specified by Cardiologist or [facility])  Prior Clearance Addressed: Yes        Anticoags/Antiplatelets: Apixaban   Outstanding Cardiac Imaging : No  Stent, Cardiac Devices, or Catheterization: No  Ablation, TAVR, Cardioversion: No  Recent Cardiac Hospitalization: No            When: N/A  History (cardiac history):   Past Medical History:   Diagnosis Date    Afib (HCC)     Atrial fibrillation (HCC)     Hyperlipidemia     Hypertension     Stroke (HCC)              Surgical Clearance Letter Sent: YES   **Scan clearance request letter into [employer].**

## 2023-05-24 DIAGNOSIS — Z79.899 HIGH RISK MEDICATION USE: ICD-10-CM

## 2023-05-24 NOTE — TELEPHONE ENCOUNTER
To JA: Last OV 3/24/23  Please advise regarding blood thinner concerns    ===========    Phone Number Called: [phone]    Call outcome: Spoke to patient regarding message below.    Message: Called to discuss concern with bruising. Stated that he is taking Aspirin 81mg over the counter and Eliquis as prescribed. Has been on Aspirin OTC about 6 months ago and says that is when he noticed more bruising. He does not remember who told him to take it. Lately bruising has been increasing and is all over both arms including hands.    Aspirin currently not noted in patient MAR.  Pt said it okay to respond via [employer]

## 2023-06-15 ENCOUNTER — OFFICE VISIT (OUTPATIENT)
Dept: NEUROLOGY | Facility: MEDICAL CENTER | Age: 78
End: 2023-06-15
Attending: PSYCHIATRY & NEUROLOGY
Payer: MEDICARE

## 2023-06-15 VITALS
SYSTOLIC BLOOD PRESSURE: 120 MMHG | WEIGHT: 229.5 LBS | RESPIRATION RATE: 16 BRPM | HEIGHT: 73 IN | DIASTOLIC BLOOD PRESSURE: 70 MMHG | BODY MASS INDEX: 30.42 KG/M2 | OXYGEN SATURATION: 96 % | HEART RATE: 79 BPM

## 2023-06-15 DIAGNOSIS — I65.21 CAROTID STENOSIS, RIGHT: ICD-10-CM

## 2023-06-15 DIAGNOSIS — R26.81 GAIT INSTABILITY: ICD-10-CM

## 2023-06-15 DIAGNOSIS — Z86.73 HISTORY OF STROKE: ICD-10-CM

## 2023-06-15 DIAGNOSIS — R29.818 TRANSIENT NEUROLOGICAL SYMPTOMS: ICD-10-CM

## 2023-06-15 PROCEDURE — 3074F SYST BP LT 130 MM HG: CPT | Performed by: PSYCHIATRY & NEUROLOGY

## 2023-06-15 PROCEDURE — 99215 OFFICE O/P EST HI 40 MIN: CPT | Performed by: PSYCHIATRY & NEUROLOGY

## 2023-06-15 PROCEDURE — 99212 OFFICE O/P EST SF 10 MIN: CPT | Performed by: PSYCHIATRY & NEUROLOGY

## 2023-06-15 PROCEDURE — 3078F DIAST BP <80 MM HG: CPT | Performed by: PSYCHIATRY & NEUROLOGY

## 2023-06-15 RX ORDER — ASPIRIN 81 MG/1
81 TABLET ORAL DAILY
COMMUNITY

## 2023-06-15 ASSESSMENT — PATIENT HEALTH QUESTIONNAIRE - PHQ9: CLINICAL INTERPRETATION OF PHQ2 SCORE: 0

## 2023-06-15 ASSESSMENT — FIBROSIS 4 INDEX: FIB4 SCORE: 2.41

## 2023-06-15 NOTE — PROGRESS NOTES
NEUROLOGY NEW PATIENT ENCOUNTER - 06/15/2023       Chief Complaint: Possible TIA 04/2022 while on anticoagulation       HISTORY OF PRESENTING COMPLAINT:  Mr. Blount is a 80-year-old right-handed gentleman coming to neurology clinic for his possible TIA-like episode in 04/2022.  Previously seen in our clinic by our nurse practitioner HA Batista in 12/2021.  This was his first visit with me .  Majority of his complaints were documented from review of the previous medical records.  He was accompanied by his wife for today's clinic visit he was able to provide some additional history.    He is in neurology clinic to get clearance from a neurological perspective prior to his colonoscopy.  He has history of left frontal stroke in 2012, presumed to be secondary to left carotid stenosis and he underwent left carotid endarterectomy.  He did not have any significant residual deficits.  There is history of atrial fibrillation and coronary artery disease on anticoagulation and antiplatelet therapy.  He was previously seen in neurology clinic for episodes of word finding difficulty lasting up to an hour in December 2020 and in August 2021.  Following this he had an ambulatory EEG study which did not show any significant epileptiform abnormalities.  Since August 2021 he has not had any similar episodes.    He was admitted to [facility] in April 2022 when he presented with left-sided facial droop and difficulty with word finding lasting for up to 30 minutes.  There was possibility of a similar episode a week prior to presentation as well lasting up to 15 minutes following this his Xarelto was changed to Eliquis due to concern for breakthrough episode on Xarelto.  MRI of the brain did not show any acute process at that time.  He continues to follow-up with his vascular surgeon for his right-sided carotid stenosis, 50 to 69%.    There is history of chronic back pain and uses a cane for ambulation.  He denied any  radiculopathy complaints.  No difficulty with diplopia, vertigo, swallowing difficulty.  There is history of BPH status post TURP.    He denied any episodes of changes in awareness, loss of consciousness or other episodes concerning for seizures.    Previous Investigations:    Carotid US: 04/2023:Compared to the prior study on 1/20/22 - there is now moderate RIGHT  carotid stenosis, previously mild.   Moderate stenosis of the right internal carotid artery (50-69%).  Surgical changes of the carotid bifurcation consistent with post carotid    endarterectomy status.  Mild stenosis of the left internal carotid artery (<50%).     MRI brain 08/2021:  No acute abnormality. A small area of encephalomalacia in the left frontal lobe. Mild cerebral atrophy. Mild chronic microvascular ischemic disease.    24hr AEEG 11/2021: This is a  normal 23 hours ambulatory electroencephalogram recording in the awake, drowsy and sleep state.  No events were captured during the study.     CURRENT MEDICATIONS AT THE TIME OF THIS ENCOUNTER:    Current Outpatient Medications:     aspirin, 81 mg, Oral, DAILY    GaviLyte-G, FOLLOW [facility] [employer] INSTRUCTION HANDOUT, PRN    atorvastatin, 40 mg, Oral, Q EVENING, Taking    apixaban, 5 mg, Oral, BID, Taking    finasteride, 5 mg, Oral, QDAY, Taking    LORazepam, 0.5 mg, Oral, TID PRN, PRN    loratadine, 10 mg, Oral, DAILY, Taking    lidocaine, 1 Patch, Transdermal, QDAY PRN, PRN    traZODone, 100 mg, Oral, Nightly, Taking    metoprolol SR, 50 mg, Oral, DAILY, Taking    therapeutic multivitamin-minerals, 1 Tablet, Oral, DAILY, Taking    cyanocobalamin, 1,000 mcg, Oral, DAILY, Taking    fish oil, 1,000 mg, Oral, DAILY, Taking    cyclobenzaprine, 10 mg, Oral, TID PRN, PRN    hydroCHLOROthiazide, 25 mg, Oral, DAILY, Taking    lisinopril, 20 mg, Oral, DAILY, Taking    pantoprazole, 40 mg, Oral, DAILY, Taking    tamsulosin, 0.4 mg, Oral, QHS, Taking    COVID-19mRNA Bival Vac Moderna, Inject  into the  "shoulder, thigh, or buttocks.    Moderna COVID-19 Vaccine, Inject  into the shoulder, thigh, or buttocks.     PAST MEDICAL HISTORY:  Past Medical History:   Diagnosis    Atrial fibrillation     Hyperlipidemia    BPH s/p TURP    CAD s/p stent    Right carotid stenosis    Left carotid stenosis s/p CEA 2012    Hypertension    Cardiomyopathy        PAST SURGICAL HISTORY:  No past surgical history on file.     FAMILY HISTORY:  1 son and 1 daughter in good health  Sister with CA breast metastases  in mid 60's. Brother with sepsis -     SOCIAL HISTORY:    Retired   Lives at home with spouse    Social History     Tobacco Use    Smoking status: Former    Smokeless tobacco: Never   Vaping Use    Vaping Use: Never used   Substance Use Topics    Alcohol use: Yes     Comment: 2 drinks/night    Drug use: Never          Review of systems:      All other systems were reviewed and negative other than the ones mentioned in HPI.    EXAM:   Ambulatory Vitals:  /70 (BP Location: Left arm, Patient Position: Sitting, BP Cuff Size: Adult)   Pulse 79   Resp 16   Ht 1.854 m (6' 1\")   Wt 104 kg (229 lb 8 oz)   SpO2 96%        Physical Exam:   Neurological Exam:  Higher Mental Function:   Awake, alert and oriented to place, person and time  Able to answer questions appropriately and follow commands well  Memory intact to immediate recall and remote events  Speech is clear and language fluent   Mood: affect appears normal    Cranial Nerves:  CN II: Pupils equal and reactive, no visual field deficits on confrontation  CN III,IV, VI : EOM intact with no nystagmus  CN V: Facial sensation intact  CN VII: No facial asymmetry  CN VIII: Intact hearing to conversation  CN IX, X: palate elevates symmetrically   CN XI: Symmetric shoulder shrug  CN XII: tongue midline. No signs of tongue biting or fasciculations    Motor: 5/5  strength in bilateral upper and lower extremities, No tremor at rest or on outstretched hands. Tone " normal and no fasciculations  Sensory: Intact to light touch, temperature in all 4 extremities  Reflexes: Diminished in bilateral upper and lower extremities   Coordination: Intact to finger to nose in both upper extremities, no truncal or appendicular ataxia  Gait: Minimal difficulty getting up from a chair with slightly stooped forward posture with the use of a cane      General:   Patient in no acute distress, pleasant and cooperative.  HEENT: Normocephalic, no signs of acute trauma.   Neck: Supple. There is normal range of motion.     ASSESSMENT AND PLAN:  History of stroke:   is a 78-year-old gentleman with history of left frontal stroke in the setting of left-sided carotid stenosis status post CEA in 2012 with, without any residual deficits.  Patient had episodes of transient language difficulty and 2020 and 2021 without any additional episodes since that time.  He had another episode of left-sided facial droop in April 2022 and his anticoagulation was changed.  With his multiple vascular risk factors there is possibility of TIA-like events contributing to these.  Left-sided facial droop would not be secondary to a seizure-like event with left frontal encephalomalacia from his previous stroke.  He has not had any other recurrent stereotypical events.  Even his previous episode of word finding difficulty, there was no associated changes in awareness or consciousness and the events lasted for up to an hour.  He continues to be on anticoagulation for his history of atrial fibrillation and aspirin 81 mg for his history of coronary artery disease and right-sided carotid stenosis.  He continues to follow-up with a vascular surgeon and per the patient no intervention has been recommended yet and they are closely following this.  He will continue to follow-up closely with his primary care physician for optimal control of his other small vessel stroke risk factors    2. Right carotid stenosis:  There is  moderate right-sided stenosis noted on carotid ultrasound from April 2023.  He continues to follow-up closely with his vascular surgeon and is on aspirin 81 mg and anticoagulation..    3. 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  He is aware of his risk of falls and denied any radiculopathy symptoms or lower extremity weakness noted on exam.    4.. Colonoscopy clearance:  Patient is now returning to neurology clinic to get clearance for his colonoscopy.  We discussed risks for cerebrovascular ischemic events with any surgical procedures and intervention.    There are no contraindications for colonoscopy from a neurological perspective.  We would recommend trying to minimize the time of anticoagulation and antiplatelet therapy to reduce risk for additional ischemic events.  There is also underlying right carotid stenosis of moderate degree.    They will follow up with Neurology on as needed basis. He will continue to follow-up closely with their primary care physician for other medical comorbidities.  If there are any breakthrough episodes of concerns, or new symptoms, they will reach out to our clinic or seek immediate medical attention for any urgent matters.     Total time of the visit was 43 minutes including time spent in precharting, review of the previous history and test results, documentation, discussing medication safety, side effects, reviewing plan of care and answering patient's questions .    Juan Luis Foote MD, MHS  [facility]

## 2023-07-25 ENCOUNTER — TELEPHONE (OUTPATIENT)
Dept: CARDIOLOGY | Facility: MEDICAL CENTER | Age: 78
End: 2023-07-25
Payer: MEDICARE

## 2023-07-25 NOTE — LETTER
PROCEDURE/SURGERY CLEARANCE FORM    Date: 7/27/2023   Patient Name: Carl Blount    Dear Surgeon or Proceduralist,      Thank you for your request for cardiac stratification of our mutual patient Carl Blount 1945. We have reviewed their [facility] records; and to the best of our understanding this patient has not had stenting, ablation, cardiothoracic surgery or hospitalization for cardiovascular reasons in the past 6 months.  Carl Blount has been seen within the past 18 months and is considered to have non-modifiable cardiac risk for this low-risk procedure/surgery. They may proceed from a cardiovascular standpoint and may hold their antiplatelet/anticoagulation as briefly as possible. Please have patient resume this medication when hemodynamically stable to do so.     Aspirin or Prasugrel   - hold 7 days prior to procedure/surgery, resume when hemodynamically stable      Clopidrogrel or Ticagrelor  - hold 7 days for all neurological procedures, hold 5 days prior to all other procedure/surgery,  resume when hemodynamically stable     Warfarin - hold 7 days for all neurological procedures, hold 5 days prior to all other procedure/surgery and coordinate with [facility] ([phone]) INR testing and dose management.      Pradaxa/Xarelto/Eliquis/Savesya - hold 1 day prior to procedure for low bleeding risk procedure, 2 days for high bleeding risk procedure, or consider holding 3 days or longer for patients with reduced kidney function (CrCl <30mL/min) or spinal/cranial surgeries/procedures.      If they have a mechanical heart valve, please coordinate with [facility] ([phone]) the proper management of their anticoagulant in the periprocedural or perioperative period.      Some patients have higher risk for cardiovascular complications or holding medication. If our patient has had prior complications of holding antiplatelet or anticoagulants  "in the past and we have seen them after these events, we have addressed these concerns with the patient. They are at an unknown degree of increased risk for recurrent complication.  You may hold anticoagulation/antiplatelets for the procedure or surgery if the benefits of the procedure or surgery outweigh this nonmodifiable risk.      If Carl Blount 1945 has new symptoms of heart failure decompensation, unstable arrythmia, or angina please reach out and we will assess the patient.      If you have other patient-specific concerns, please feel free to reach out to the patient's cardiologist directly at [phone].  \"May hold aspirin for 5 days and Apixaban for 3 days prior to spinal injection, restart when neurosurgery team advises (as soon as possible).\" Jenniffer Unger. PA  Thank you,       [facility]      "

## 2023-07-25 NOTE — TELEPHONE ENCOUNTER
Last OV: 3/24/2023  Proposed Surgery: Lumbar facet injections  Surgery Date: 1945  Requesting Office Name: [facility]  Fax Number: [phone]  Preference of Location (default is surgery center unless specified by Cardiologist or [facility])  Prior Clearance Addressed: No      Anticoags/Antiplatelets: Aspirin and Apixaban   Outstanding Cardiac Imaging : Yes  Other US-Carotid Doppler Bilat  Clearance to provider to review  Stent, Cardiac Devices, or Catheterization: No  Ablation, TAVR/Valve (including open heart), Cardioversion: No  Recent Cardiac Hospitalization: Yes  Date:  4/24/2022            When: Greater than 6 months since hospitalization.   History (cardiac history):   Past Medical History:   Diagnosis Date    Afib (HCC)     Atrial fibrillation (HCC)     Hyperlipidemia     Hypertension     Stroke (HCC)              Surgical Clearance Letter Sent: NO. Provider to advise.   **Scan clearance request letter into [facility].**

## 2023-09-28 ENCOUNTER — APPOINTMENT (OUTPATIENT)
Dept: CARDIOLOGY | Facility: MEDICAL CENTER | Age: 78
End: 2023-09-28
Attending: INTERNAL MEDICINE
Payer: MEDICARE

## 2023-10-23 ENCOUNTER — OFFICE VISIT (OUTPATIENT)
Dept: CARDIOLOGY | Facility: MEDICAL CENTER | Age: 78
End: 2023-10-23
Attending: NURSE PRACTITIONER
Payer: MEDICARE

## 2023-10-23 VITALS
SYSTOLIC BLOOD PRESSURE: 110 MMHG | OXYGEN SATURATION: 95 % | BODY MASS INDEX: 31.01 KG/M2 | HEART RATE: 73 BPM | HEIGHT: 73 IN | DIASTOLIC BLOOD PRESSURE: 60 MMHG | WEIGHT: 234 LBS | RESPIRATION RATE: 14 BRPM

## 2023-10-23 DIAGNOSIS — I65.21 CAROTID STENOSIS, RIGHT: ICD-10-CM

## 2023-10-23 DIAGNOSIS — E66.9 OBESITY (BMI 30-39.9): ICD-10-CM

## 2023-10-23 DIAGNOSIS — I25.5 ISCHEMIC CARDIOMYOPATHY: ICD-10-CM

## 2023-10-23 DIAGNOSIS — Z86.73 HISTORY OF STROKE: ICD-10-CM

## 2023-10-23 DIAGNOSIS — Z98.890 HISTORY OF CAROTID ENDARTERECTOMY: ICD-10-CM

## 2023-10-23 DIAGNOSIS — E78.2 MIXED HYPERLIPIDEMIA: ICD-10-CM

## 2023-10-23 DIAGNOSIS — I10 ESSENTIAL HYPERTENSION: ICD-10-CM

## 2023-10-23 DIAGNOSIS — I48.0 PAROXYSMAL ATRIAL FIBRILLATION (HCC): ICD-10-CM

## 2023-10-23 DIAGNOSIS — I77.72 ILIAC DISSECTION (HCC): ICD-10-CM

## 2023-10-23 DIAGNOSIS — I25.10 CORONARY ARTERY DISEASE INVOLVING NATIVE HEART WITHOUT ANGINA PECTORIS, UNSPECIFIED VESSEL OR LESION TYPE: ICD-10-CM

## 2023-10-23 DIAGNOSIS — R29.818 TRANSIENT NEUROLOGICAL SYMPTOMS: ICD-10-CM

## 2023-10-23 PROBLEM — G45.9 TIA (TRANSIENT ISCHEMIC ATTACK): Status: RESOLVED | Noted: 2021-01-25 | Resolved: 2023-10-23

## 2023-10-23 PROBLEM — Z01.810 PREOPERATIVE CARDIOVASCULAR EXAMINATION: Status: RESOLVED | Noted: 2020-07-24 | Resolved: 2023-10-23

## 2023-10-23 PROBLEM — I69.30 LATE EFFECT OF STROKE: Status: RESOLVED | Noted: 2021-09-01 | Resolved: 2023-10-23

## 2023-10-23 PROBLEM — I65.23 BILATERAL CAROTID ARTERY STENOSIS: Status: RESOLVED | Noted: 2020-07-24 | Resolved: 2023-10-23

## 2023-10-23 PROBLEM — R29.810 FACIAL DROOP: Status: RESOLVED | Noted: 2022-04-24 | Resolved: 2023-10-23

## 2023-10-23 PROBLEM — I48.91 ATRIAL FIBRILLATION (HCC): Status: RESOLVED | Noted: 2022-01-14 | Resolved: 2023-10-23

## 2023-10-23 PROCEDURE — 3074F SYST BP LT 130 MM HG: CPT | Performed by: NURSE PRACTITIONER

## 2023-10-23 PROCEDURE — 99214 OFFICE O/P EST MOD 30 MIN: CPT | Performed by: NURSE PRACTITIONER

## 2023-10-23 PROCEDURE — 99213 OFFICE O/P EST LOW 20 MIN: CPT | Performed by: NURSE PRACTITIONER

## 2023-10-23 PROCEDURE — 3078F DIAST BP <80 MM HG: CPT | Performed by: NURSE PRACTITIONER

## 2023-10-23 RX ORDER — OXYBUTYNIN CHLORIDE 5 MG/1
5 TABLET ORAL 2 TIMES DAILY
COMMUNITY
End: 2024-01-04

## 2023-10-23 RX ORDER — EMPAGLIFLOZIN 10 MG/1
10 TABLET, FILM COATED ORAL DAILY
Qty: 30 TABLET | Refills: 3 | Status: SHIPPED | OUTPATIENT
Start: 2023-10-23 | End: 2023-10-30 | Stop reason: SDUPTHER

## 2023-10-23 RX ORDER — HYDROXYCHLOROQUINE SULFATE 200 MG/1
200 TABLET, FILM COATED ORAL 2 TIMES DAILY
COMMUNITY
Start: 2023-09-25

## 2023-10-23 RX ORDER — AMOXICILLIN 500 MG/1
500 CAPSULE ORAL 3 TIMES DAILY
COMMUNITY
End: 2023-12-22

## 2023-10-23 RX ORDER — TERAZOSIN 2 MG/1
CAPSULE ORAL
COMMUNITY
End: 2024-01-04

## 2023-10-23 RX ORDER — CARBOXYMETHYLCELLULOSE SODIUM 5 MG/ML
SOLUTION/ DROPS OPHTHALMIC
COMMUNITY
End: 2024-01-04

## 2023-10-23 RX ORDER — LORAZEPAM 1 MG/1
0.5 TABLET ORAL
COMMUNITY
Start: 2023-07-25 | End: 2023-10-23

## 2023-10-23 RX ORDER — VITAMIN B COMPLEX
1000 TABLET ORAL DAILY
COMMUNITY

## 2023-10-23 ASSESSMENT — ENCOUNTER SYMPTOMS
CLAUDICATION: 0
MYALGIAS: 0
SHORTNESS OF BREATH: 1
PND: 0
DIZZINESS: 0
FEVER: 0
COUGH: 0
PALPITATIONS: 0
ABDOMINAL PAIN: 0
ORTHOPNEA: 0

## 2023-10-23 ASSESSMENT — FIBROSIS 4 INDEX: FIB4 SCORE: 2.41

## 2023-10-23 NOTE — PATIENT INSTRUCTIONS
We will start a new medication for your heart failure called jardiance 10 mg once a day for heart failure.    We will obtain echocardiogram of your heart and have you see our heart failure specialists in the office to further manage your heart failure medications and symptoms.

## 2023-10-23 NOTE — PROGRESS NOTES
Chief Complaint   Patient presents with    Transient Ischemic Attack    Atrial Fibrillation     F/v dx:   Paroxysmal atrial fibrillation (HCC)          Coronary Artery Disease     F/v dx: Coronary artery disease involving native heart without angina pectoris     Subjective     Marcin Blount is a 78 y.o. male who presents today for 6 month follow up for chronic systolic heart failure.    He is a patient of Dr. Davis and Marlene Unger PA-C.    Hx of presumed ischemic cardiomyopathy with rEF of 20%, HTN, PAF on chronic anticoagulation, HLD with CAD with PCI to RCA in '18, obesity, TIA and prior CVA with carotid endarterectomy, and iliac dissection.    He presents today with his wife.     He uses a cane for his debility from his prior CVA.    He did have a TIA about a month ago. Didn't go to the doctor or hospital for this.    He doesn't quite know about his heart failure and is unsure why he isn't on all the guideline medications.    He has progressive shortness of breath and fatigue.    He also has lower extremity edema. No chest pain or palpitations noted.    Past Medical History:   Diagnosis Date    Afib (HCC)     Atrial fibrillation (HCC)     Hyperlipidemia     Hypertension     Stroke (HCC)      History reviewed. No pertinent surgical history.  History reviewed. No pertinent family history.  Social History     Socioeconomic History    Marital status:      Spouse name: Not on file    Number of children: Not on file    Years of education: Not on file    Highest education level: Not on file   Occupational History    Not on file   Tobacco Use    Smoking status: Former    Smokeless tobacco: Never   Vaping Use    Vaping Use: Never used   Substance and Sexual Activity    Alcohol use: Yes     Comment: 2 drinks/night    Drug use: Never    Sexual activity: Not Currently   Other Topics Concern    Not on file   Social History Narrative    Not on file     Social Determinants of Health     Financial Resource  Strain: Low Risk  (4/29/2022)    Overall Financial Resource Strain (CARDIA)     Difficulty of Paying Living Expenses: Not hard at all   Food Insecurity: No Food Insecurity (4/29/2022)    Hunger Vital Sign     Worried About Running Out of Food in the Last Year: Never true     Ran Out of Food in the Last Year: Never true   Transportation Needs: No Transportation Needs (4/29/2022)    PRAPARE - Transportation     Lack of Transportation (Medical): No     Lack of Transportation (Non-Medical): No   Physical Activity: Not on file   Stress: Not on file   Social Connections: Not on file   Intimate Partner Violence: Not on file   Housing Stability: Not on file     No Known Allergies  Outpatient Encounter Medications as of 10/23/2023   Medication Sig Dispense Refill    Mirabegron ER 50 MG TABLET SR 24 HR 50 mg.      hydroxychloroquine (PLAQUENIL) 200 MG Tab Take 1 Tablet by mouth 2 times a day.      diclofenac sodium (VOLTAREN) 1 % Gel Apply  topically 4 times a day as needed.      Carboxymethylcellulose Sod PF 0.5 % Solution Administer  into affected eye(s).      oxybutynin (DITROPAN) 5 MG Tab Take 5 mg by mouth 3 times a day.      amoxicillin (AMOXIL) 500 MG Cap Take 500 mg by mouth 3 times a day.      vitamin D3 (CHOLECALCIFEROL) 1000 Unit (25 mcg) Tab Take 1,000 Units by mouth every day.      terazosin (HYTRIN) 2 MG Cap Take 1 capsule every day by oral route.      Empagliflozin (JARDIANCE) 10 MG Tab tablet Take 1 Tablet by mouth every day. 30 Tablet 3    aspirin 81 MG EC tablet Take 81 mg by mouth every day.      atorvastatin (LIPITOR) 40 MG Tab Take 1 Tablet by mouth every evening. 100 Tablet 3    COVID-19mRNA Bival Vac Moderna (MODERNA COVID-19 BIVALENT) 50 MCG/0.5ML Suspension injection Inject  into the shoulder, thigh, or buttocks. 0.5 mL 0    COVID-19 mRNA vaccine, Moderna, (MODERNA COVID-19 VACCINE) 100 MCG/0.5ML Suspension injection Inject  into the shoulder, thigh, or buttocks. 0.25 mL 0    apixaban (ELIQUIS) 5mg  Tab Take 1 Tablet by mouth 2 times a day. 180 Tablet 1    finasteride (PROSCAR) 5 MG Tab Take 5 mg by mouth every day.      LORazepam (ATIVAN) 0.5 MG Tab Take 0.5 mg by mouth 3 times a day as needed for Anxiety.      loratadine (CLARITIN) 10 MG Tab Take 10 mg by mouth every day.      lidocaine (LIDODERM) 5 % Patch Place 1 Patch on the skin 1 time a day as needed.      traZODone (DESYREL) 100 MG Tab Take 100 mg by mouth every evening.      metoprolol SR (TOPROL XL) 50 MG TABLET SR 24 HR Take 1 Tablet by mouth every day. 90 Tablet 3    therapeutic multivitamin-minerals (THERAGRAN-M) Tab Take 1 tablet by mouth every day.      cyanocobalamin (VITAMIN B12) 1000 MCG Tab Take 1,000 mcg by mouth every day.      Omega-3 Fatty Acids (FISH OIL) 1000 MG Cap capsule Take 1,000 mg by mouth every day.      cyclobenzaprine (FLEXERIL) 10 MG Tab Take 10 mg by mouth 3 times a day as needed for Muscle Spasms.      lisinopril (PRINIVIL) 20 MG Tab Take 20 mg by mouth every day.      pantoprazole (PROTONIX) 40 MG Tablet Delayed Response Take 40 mg by mouth every day.      tamsulosin (FLOMAX) 0.4 MG capsule Take 0.4 mg by mouth at bedtime.      [DISCONTINUED] LORazepam (ATIVAN) 1 MG Tab 0.5 mg.      [DISCONTINUED] GAVILYTE-G 236 g Recon Soln FOLLOW GI CONSULTANTS INSTRUCTION HANDOUT (Patient not taking: Reported on 10/23/2023)      [DISCONTINUED] hydroCHLOROthiazide (HYDRODIURIL) 25 MG Tab Take 25 mg by mouth every day. (Patient not taking: Reported on 10/23/2023)       No facility-administered encounter medications on file as of 10/23/2023.     Review of Systems   Constitutional:  Positive for malaise/fatigue. Negative for fever.   Respiratory:  Positive for shortness of breath. Negative for cough.    Cardiovascular:  Positive for leg swelling. Negative for chest pain, palpitations, orthopnea, claudication and PND.   Gastrointestinal:  Negative for abdominal pain.   Musculoskeletal:  Negative for myalgias.   Neurological:  Negative for  "dizziness.              Objective     /60 (BP Location: Left arm, Patient Position: Sitting, BP Cuff Size: Adult)   Pulse 73   Resp 14   Ht 1.854 m (6' 1\")   Wt 106 kg (234 lb)   SpO2 95%   BMI 30.87 kg/m²     Physical Exam  Vitals and nursing note reviewed.   Constitutional:       Appearance: Normal appearance. He is well-developed. He is obese.   HENT:      Head: Normocephalic and atraumatic.   Neck:      Vascular: No JVD.   Cardiovascular:      Rate and Rhythm: Normal rate and regular rhythm.      Pulses: Normal pulses.      Heart sounds: Normal heart sounds.   Pulmonary:      Effort: Pulmonary effort is normal.      Breath sounds: Normal breath sounds.   Musculoskeletal:         General: Normal range of motion.      Right lower leg: Edema present.      Left lower leg: Edema present.      Comments: Debility uses cane; bilateral LE edema 2+ pitting edema   Skin:     General: Skin is warm and dry.      Capillary Refill: Capillary refill takes less than 2 seconds.   Neurological:      General: No focal deficit present.      Mental Status: He is alert and oriented to person, place, and time. Mental status is at baseline.   Psychiatric:         Mood and Affect: Mood normal.         Behavior: Behavior normal.         Thought Content: Thought content normal.         Judgment: Judgment normal.                Assessment & Plan     1. Ischemic cardiomyopathy  EC-ECHOCARDIOGRAM COMPLETE W/O CONT    Empagliflozin (JARDIANCE) 10 MG Tab tablet      2. Carotid stenosis, right        3. Coronary artery disease involving native heart without angina pectoris, unspecified vessel or lesion type        4. Essential hypertension        5. History of carotid endarterectomy        6. History of stroke        7. Iliac dissection (HCC) history        8. Mixed hyperlipidemia        9. Obesity (BMI 30-39.9)        10. Paroxysmal atrial fibrillation (HCC)        11. Transient neurological symptoms          Medical Decision " Making: Today's Assessment/Status/Plan:      1. HFrEF, Stage C, Class III, LVEF 20%-45%: ischemic cardiomyopathy  -Heart failure due to ischemic cardiomyopathy?  -Discussed Heart failure trajectory and prognosis with patient. Will continue to optimize medical therapy as tolerated. Advanced HF treatment, need for remote monitoring consideration at every visit.   -ACE-I/ARB/ARNI: lisinopril 20 mg QD, consider ARNI at next apt  -Evidence Based Beta-blocker: toprol 50 mg QPM  -Aldosterone Antagonist: add aldactone 25 mg in two weeks with BMP  -Diuretic: consider in future  -SGLT2 inhibitor: ADD jardiance 10 mg QD  -Labs: repeat bnp, bmp in 1 month, Will continue to closely monitor for side effects of patient's high risk medication(s) including renal function, liver function NTproBNP/cardiac markers, and electrolytes as needed  -discussed importance of exercise and regular activity  -Discussed/reviewed maintaining a low Sodium and hydration recommendations  -Repeat Echo soon,, if LVEF not >35%, then will discuss/consider ICD for primary Prevention. refer to EP for CRT consideration  -Reinforced s/sx of worsening heart failure with patient and weight monitoring. Pt verbalizes understanding. Pt to call office or RTC if present.      2. HLD with CAD with PCI to RCA in '18  -no angina or GAO  -cont asa, statin  -LDL goal <70 with CAD    3. CVA with prior carotid endarterectomy  -follows with Dr. Condon  -cont asa, statin, eliquis  -follow imaging per vascular  -LDL goal <70    4. PAF on chronic anticoagulation  -no events, rate controlled SR today  -cont eliquis 5 mg BID, no bleeding/tolerating well  -cont toprol for rate control    5. HTN  -good control on toprol, lisinopril  -follow at home  -BP goal <130/80    Patient is to follow up with HF CLINIC in 2-4 weeks with review of echo and med changes.

## 2023-10-28 ENCOUNTER — PATIENT MESSAGE (OUTPATIENT)
Dept: CARDIOLOGY | Facility: MEDICAL CENTER | Age: 78
End: 2023-10-28
Payer: MEDICARE

## 2023-10-30 DIAGNOSIS — I25.5 ISCHEMIC CARDIOMYOPATHY: ICD-10-CM

## 2023-10-30 RX ORDER — EMPAGLIFLOZIN 10 MG/1
10 TABLET, FILM COATED ORAL DAILY
Qty: 30 TABLET | Refills: 3 | Status: SHIPPED | OUTPATIENT
Start: 2023-10-30 | End: 2023-10-31 | Stop reason: SDUPTHER

## 2023-10-30 NOTE — TELEPHONE ENCOUNTER
Srinivas!    I'm helping Marcin Blount transition their medications to [facility] on [address].  Would you please authorize these prescriptions?    Thank you!  Elba Chapa  Rx Coordinator   [phone]

## 2023-10-31 ENCOUNTER — TELEPHONE (OUTPATIENT)
Dept: VASCULAR LAB | Facility: MEDICAL CENTER | Age: 78
End: 2023-10-31
Payer: MEDICARE

## 2023-10-31 DIAGNOSIS — I25.5 ISCHEMIC CARDIOMYOPATHY: ICD-10-CM

## 2023-10-31 RX ORDER — EMPAGLIFLOZIN 10 MG/1
10 TABLET, FILM COATED ORAL DAILY
Qty: 30 TABLET | Refills: 3 | Status: SHIPPED | OUTPATIENT
Start: 2023-10-31 | End: 2024-01-04

## 2023-10-31 NOTE — TELEPHONE ENCOUNTER
Srinivas!    I'm helping Marcin Blount transition their medications to [facility] on [address].  Would you please authorize these prescriptions?    Thank you!    Elba Chapa  Rx Coordinator   [phone]

## 2023-10-31 NOTE — TELEPHONE ENCOUNTER
Received New start PA request via [employer]  for JARDIANCE 10MG TABLETS. (Quantity:90, Day Supply:90)     Insurance: [employer] Rx D  Member ID:  [id number]  BIN: [id number]  PCN: [id number]  Group: [id number]     Ran Test claim via [place] & medication Pays for a $422.17/90DS ; $332.17/30DS copay. Will outreach to patient to offer specialty pharmacy services and or release to preferred pharmacy    LIV Dudley, PhT  Pharmacy Liaison (Rx Coordinator)  P: [phone]  10/31/2023 10:43 AM

## 2023-11-02 ENCOUNTER — HOSPITAL ENCOUNTER (OUTPATIENT)
Dept: CARDIOLOGY | Facility: MEDICAL CENTER | Age: 78
End: 2023-11-02
Attending: NURSE PRACTITIONER
Payer: MEDICARE

## 2023-11-02 DIAGNOSIS — I25.5 ISCHEMIC CARDIOMYOPATHY: ICD-10-CM

## 2023-11-02 LAB
LV EJECT FRACT  99904: 35
LV EJECT FRACT MOD 2C 99903: 27.28
LV EJECT FRACT MOD 4C 99902: 37.24
LV EJECT FRACT MOD BP 99901: 33.96

## 2023-11-02 PROCEDURE — 93306 TTE W/DOPPLER COMPLETE: CPT | Mod: 26 | Performed by: INTERNAL MEDICINE

## 2023-11-02 PROCEDURE — 93306 TTE W/DOPPLER COMPLETE: CPT

## 2023-11-02 PROCEDURE — 700117 HCHG RX CONTRAST REV CODE 255: Performed by: NURSE PRACTITIONER

## 2023-11-02 RX ADMIN — HUMAN ALBUMIN MICROSPHERES AND PERFLUTREN 3 ML: 10; .22 INJECTION, SOLUTION INTRAVENOUS at 17:15

## 2023-11-06 ENCOUNTER — TELEPHONE (OUTPATIENT)
Dept: CARDIOLOGY | Facility: MEDICAL CENTER | Age: 78
End: 2023-11-06
Payer: MEDICARE

## 2023-11-06 DIAGNOSIS — I25.5 ISCHEMIC CARDIOMYOPATHY: ICD-10-CM

## 2023-11-06 DIAGNOSIS — I10 ESSENTIAL HYPERTENSION: ICD-10-CM

## 2023-11-06 DIAGNOSIS — Z98.890 HISTORY OF CAROTID ENDARTERECTOMY: ICD-10-CM

## 2023-11-06 RX ORDER — SPIRONOLACTONE 25 MG/1
25 TABLET ORAL DAILY
Qty: 90 TABLET | Refills: 3 | Status: SHIPPED | OUTPATIENT
Start: 2023-11-06

## 2023-11-06 NOTE — TELEPHONE ENCOUNTER
----- Message from DG Antonio sent at 11/6/2023 11:19 AM PST -----  Echo shows improvement in EF but also imaging is more clear this time. Recommend add aldactone 25 mg QD and order bmp in 2 weeks if BP stable. How is he doing on other HF medications we started last visit? He has HF apt in a few weeks as well. [place]

## 2023-11-06 NOTE — TELEPHONE ENCOUNTER
Phone Number Called: [phone]    Call outcome: Spoke to patient regarding message below.    Message: Spoke to patient about echocardiogram results. Patient reports that BP has been roughly in 120's over the 70's on average. Patient has not started Jardiance at this time but advised he would. Spironolactone ordered at this time per [place] recommendations. All questions answered at this time. Advised to call back with any further questions or concerns.

## 2023-11-16 ENCOUNTER — OFFICE VISIT (OUTPATIENT)
Dept: NEUROLOGY | Facility: MEDICAL CENTER | Age: 78
End: 2023-11-16
Attending: PSYCHIATRY & NEUROLOGY
Payer: MEDICARE

## 2023-11-16 VITALS
RESPIRATION RATE: 14 BRPM | OXYGEN SATURATION: 97 % | WEIGHT: 229.06 LBS | BODY MASS INDEX: 30.36 KG/M2 | SYSTOLIC BLOOD PRESSURE: 110 MMHG | HEART RATE: 60 BPM | DIASTOLIC BLOOD PRESSURE: 68 MMHG | HEIGHT: 73 IN

## 2023-11-16 DIAGNOSIS — R29.818 TRANSIENT NEUROLOGICAL SYMPTOMS: ICD-10-CM

## 2023-11-16 DIAGNOSIS — I25.5 ISCHEMIC CARDIOMYOPATHY: ICD-10-CM

## 2023-11-16 DIAGNOSIS — R26.81 GAIT INSTABILITY: ICD-10-CM

## 2023-11-16 DIAGNOSIS — Z86.73 HISTORY OF STROKE: ICD-10-CM

## 2023-11-16 DIAGNOSIS — R47.89 WORD FINDING DIFFICULTY: ICD-10-CM

## 2023-11-16 DIAGNOSIS — I65.21 CAROTID STENOSIS, RIGHT: ICD-10-CM

## 2023-11-16 PROCEDURE — 99215 OFFICE O/P EST HI 40 MIN: CPT | Performed by: PSYCHIATRY & NEUROLOGY

## 2023-11-16 PROCEDURE — 3074F SYST BP LT 130 MM HG: CPT | Performed by: PSYCHIATRY & NEUROLOGY

## 2023-11-16 PROCEDURE — 99212 OFFICE O/P EST SF 10 MIN: CPT | Performed by: PSYCHIATRY & NEUROLOGY

## 2023-11-16 PROCEDURE — 3078F DIAST BP <80 MM HG: CPT | Performed by: PSYCHIATRY & NEUROLOGY

## 2023-11-16 ASSESSMENT — FIBROSIS 4 INDEX: FIB4 SCORE: 2.41

## 2023-11-16 NOTE — PROGRESS NOTES
NEUROLOGY FOLLOW UP VISIT NOTE 11/16/2023      Chief Complaint: Possible TIA 04/2022 while on anticoagulation        INTERVAL HISTORY FROM PREVIOUS CLINIC VISIT :   Mr. Blount is a 80-year-old right-handed gentleman coming to neurology clinic for his possible TIA-like episode in 04/2022.  Previously seen in our clinic by our nurse practitioner HA Batista in 12/2021.  He was previously seen by me in June 2023 and details of his complaints are documented in my initial consult note from 6/15/2023 .Majority of his complaints were documented from review of the previous medical records.  He was accompanied by his wife for today's clinic visit he was able to provide some additional history.     Since the last clinic visit with me patient feels he had 1 episode of word finding difficulty about 2 months ago when he was at [facility].  Patient feels he had difficulty saying what he wanted to say without any changes in awareness.  His wife mentioned that after she returned with the food patient mentioned that he wanted to go home.  She mentioned that he was able to answer all questions for her and no facial droop and no extremity weakness she feels if there was any difficulty which resolved within few minutes.  Patient continues to be on Eliquis and on aspirin 81 mg and follows up with vascular doctor for his right carotid stenosis.  The episode of concern prior to this was in April 2022.    He denied any additional episodes of concern and mentions fatigability in the evenings.  There are no other episodes of changes in awareness or loss of consciousness.  Continues to use a cane for ambulation secondary to his back pain issues and right more than left lower extremity weakness.  He denied any falls.    REVIEW OF HISTORY OF PRESENTING COMPLAINT:  In summary, he was seen in Neurology clinic on 06/2023 to get clearance from a neurological perspective prior to his colonoscopy.   He has history of left frontal stroke in 2012, presumed to be secondary to left carotid stenosis and he underwent left carotid endarterectomy.  He did not have any significant residual deficits.  There is history of atrial fibrillation and coronary artery disease on anticoagulation and antiplatelet therapy.  He was previously seen in neurology clinic for episodes of word finding difficulty lasting up to an hour in December 2020 and in August 2021.  Following this he had an ambulatory EEG study which did not show any significant epileptiform abnormalities.  Since August 2021 he has not had any similar episodes.     He was admitted to [facility] in April 2022 when he presented with left-sided facial droop and difficulty with word finding lasting for up to 30 minutes.  There was possibility of a similar episode a week prior to presentation as well lasting up to 15 minutes following this his Xarelto was changed to Eliquis due to concern for breakthrough episode on Xarelto.  MRI of the brain did not show any acute process at that time.  He continues to follow-up with his vascular surgeon for his right-sided carotid stenosis, 50 to 69%.  Patient previously had episodes of language difficulty in December 2020 and August 2021.  Following this he also had an ambulatory EEG study in 2021 without any distinct epileptiform abnormalities.     There is history of chronic back pain and uses a cane for ambulation.  He denied any radiculopathy complaints.  No difficulty with diplopia, vertigo, swallowing difficulty.  There is history of BPH status post TURP.     He denied any episodes of changes in awareness, loss of consciousness or other episodes concerning for seizures.     Previous Investigations:     Carotid US: 04/2023:Compared to the prior study on 1/20/22 - there is now moderate RIGHT  carotid stenosis, previously mild. Moderate stenosis of the right internal carotid artery (50-69%).  Surgical changes of the  carotid bifurcation consistent with post carotid endarterectomy status.  Mild stenosis of the left internal carotid artery (<50%).      MRI brain 08/2021:  No acute abnormality. A small area of encephalomalacia in the left frontal lobe. Mild cerebral atrophy. Mild chronic microvascular ischemic disease.     24hr AEEG 11/2021: This is a  normal 23 hours ambulatory electroencephalogram recording in the awake, drowsy and sleep state.  No events were captured during the study.       CURRENT MEDICATIONS AT THE TIME OF THIS ENCOUNTER:    Current Outpatient Medications:     spironolactone, 25 mg, Oral, DAILY, PRN    Jardiance, 10 mg, Oral, DAILY (Patient taking differently: 12.5 mg, Oral, DAILY), Taking    Mirabegron ER, 50 mg., Taking    hydroxychloroquine, 1 Tablet, Oral, BID, Taking    diclofenac sodium, Apply  topically 4 times a day as needed., Taking    Carboxymethylcellulose Sod PF, Administer  into affected eye(s)., PRN    oxybutynin, 5 mg, Oral, TID, Taking    amoxicillin, 500 mg, Oral, TID, Taking    vitamin D3, 1,000 Units, Oral, DAILY, Taking    terazosin, Take 1 capsule every day by oral route., Taking    aspirin, 81 mg, Oral, DAILY, Taking    atorvastatin, 40 mg, Oral, Q EVENING, Taking    apixaban, 5 mg, Oral, BID, Taking    finasteride, 5 mg, Oral, QDAY, Taking    LORazepam, 0.5 mg, Oral, TID PRN, PRN    loratadine, 10 mg, Oral, DAILY, Taking    lidocaine, 1 Patch, Transdermal, QDAY PRN, PRN    traZODone, 100 mg, Oral, Nightly, Taking    metoprolol SR, 50 mg, Oral, DAILY, Taking    therapeutic multivitamin-minerals, 1 Tablet, Oral, DAILY, Taking    cyanocobalamin, 1,000 mcg, Oral, DAILY, Taking    fish oil, 1,000 mg, Oral, DAILY, Taking    cyclobenzaprine, 10 mg, Oral, TID PRN, PRN    lisinopril, 20 mg, Oral, DAILY, Taking    pantoprazole, 40 mg, Oral, DAILY, Taking    tamsulosin, 0.4 mg, Oral, QHS, Taking    COVID-19mRNA Bival Vac Moderna, Inject  into the shoulder, thigh, or buttocks.    Moderna COVID-19  "Vaccine, Inject  into the shoulder, thigh, or buttocks.     PAST MEDICAL HISTORY:      Past Medical History:   Diagnosis    Atrial fibrillation     Hyperlipidemia     BPH s/p TURP     CAD s/p stent     Right carotid stenosis     Left carotid stenosis s/p CEA 2012    Hypertension    Cardiomyopathy        PAST SURGICAL HISTORY:  No past surgical history on file.     FAMILY HISTORY:  No family history on file.     SOCIAL HISTORY:  Social History     Tobacco Use    Smoking status: Former    Smokeless tobacco: Never   Vaping Use    Vaping Use: Never used   Substance Use Topics    Alcohol use: Yes     Comment: 2 drinks/night    Drug use: Never          Review of systems:      All other systems are reviewed and negative other than the ones mentioned in HPI.     EXAM:   Ambulatory Vitals:  /68 (BP Location: Right arm, Patient Position: Sitting, BP Cuff Size: Adult)   Pulse 60   Resp 14   Ht 1.854 m (6' 1\")   Wt 104 kg (229 lb 0.9 oz)   SpO2 97%      Physical Exam:   Neurological Exam:  Higher Mental Function:   Awake, alert and oriented to place, person and time  Able to answer questions appropriately and follow commands well  Memory intact to immediate recall and remote events  Speech is clear and language fluent   Mood: affect appears normal     Cranial Nerves:  CN II: Pupils equal and reactive, no visual field deficits on confrontation  CN III,IV, VI : EOM intact with no nystagmus  CN V: Facial sensation intact  CN VII: No facial asymmetry  CN VIII: Intact hearing to conversation  CN IX, X: palate elevates symmetrically   CN XI: Symmetric shoulder shrug  CN XII: tongue midline. No signs of tongue biting or fasciculations     Motor: 5/5  strength in bilateral upper and lower extremities, with 4+ on right hip flexion compared to 5 out of 5 on left hip flexion , no tremors at rest or on outstretched hands. Tone normal and no fasciculations  Sensory: Intact to light touch, temperature in all 4 " extremities  Reflexes: Diminished in bilateral upper and lower extremities   Coordination: Intact to finger to nose in both upper extremities, no truncal or appendicular ataxia  Gait: Minimal difficulty getting up from a chair with slightly stooped forward posture with the use of a cane       General:   Patient in no acute distress, pleasant and cooperative.  HEENT: Normocephalic, no signs of acute trauma.   Neck: Supple. There is normal range of motion.      ASSESSMENT AND PLAN:  History of stroke:   is a 78-year-old gentleman with history of left frontal stroke in the setting of left-sided carotid stenosis status post CEA in 2012 with, without any residual deficits.  Patient had episodes of transient language difficulty and 2020 and 2021, following which he had an ambulatory EEG study completed in 11/2021 without any distinct epileptiform abnormalities. Patient had another episode of left-sided facial droop in April 2022 and his anticoagulation was changed.  With his multiple vascular risk factors there is possibility of TIA-like events contributing to these.  Left-sided facial droop would not be secondary to a seizure-like event with left frontal encephalomalacia from his previous stroke.      Since the last clinic visit with me in June 2023 he mentioned 1 episode of word finding difficulty without changes in awareness or associated facial droop or motor symptoms.  His wife who witnessed the episode feels he was still able to respond in one-word answers.  There was no changes in awareness or loss of consciousness and this resolved within few minutes.  With his history of left frontal stroke and encephalomalacia, there was possibility of these events being related to focal seizures.  Our plan was to consider longer-term ambulatory EEG study if he has any recurrent episodes.    His episode of word finding difficulty in 2022, there was no associated changes in awareness or consciousness and the event  lasted for up to an hour.  He continues to be on anticoagulation for his history of atrial fibrillation and aspirin 81 mg for his history of coronary artery disease and right-sided carotid stenosis.  He continues to follow-up with a vascular surgeon and per the patient no intervention has been recommended yet and they are closely following this.  He will continue to follow-up closely with his primary care physician for optimal control of his other small vessel stroke risk factors     2. Right carotid stenosis:  Moderate right-sided stenosis noted on carotid ultrasound from April 2023.  He continues to follow-up closely with his vascular surgeon and is on aspirin 81 mg and anticoagulation..     3. 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  He is aware of his risk of falls and denied any radiculopathy symptoms or lower extremity weakness noted on exam.     4..Word finding difficulty:  If he continues to have episodes which are of shorter duration and not associated with any other focal motor deficits, we discussed the need for additional testing including longer-term ambulatory EEG studies to rule out concern for focal seizures contributing to these episodes.  He will seek more immediate medical attention or inform our clinic if he has ongoing episodes.  Patient and his wife denied any progressive cognitive changes or behavioral issues.     He will follow-up with my colleague Dr. Ramsey in 2 to 3 months, as I will be moving out of state.  He will continue to follow-up closely with his primary care physician and with his cardiologist for his multiple other medical comorbidities. If there are any breakthrough episodes of concerns, or new symptoms, they will reach out to our clinic or seek immediate medical attention for any urgent matters, especially if there are focal neurological deficits.      Total time of  the visit was 46 minutes including time spent in precharting, review of the previous history and test results, documentation, discussing medication safety, side effects, reviewing plan of care and answering patient's questions .     Juan Luis Foote MD, S  [facility]

## 2023-11-16 NOTE — PROGRESS NOTES
Subjective     Marcin Blount is a 78 y.o. male who presents with Seizure and Injections            Seizure        ROS           Objective     There were no vitals taken for this visit.     Physical Exam          Neurological Exam             Assessment & Plan        There are no diagnoses linked to this encounter.

## 2023-11-28 ENCOUNTER — APPOINTMENT (OUTPATIENT)
Dept: CARDIOLOGY | Facility: MEDICAL CENTER | Age: 78
End: 2023-11-28
Attending: STUDENT IN AN ORGANIZED HEALTH CARE EDUCATION/TRAINING PROGRAM
Payer: MEDICARE

## 2023-11-29 ENCOUNTER — PATIENT MESSAGE (OUTPATIENT)
Dept: HEALTH INFORMATION MANAGEMENT | Facility: OTHER | Age: 78
End: 2023-11-29

## 2023-12-22 ENCOUNTER — OFFICE VISIT (OUTPATIENT)
Dept: CARDIOLOGY | Facility: MEDICAL CENTER | Age: 78
End: 2023-12-22
Attending: INTERNAL MEDICINE
Payer: MEDICARE

## 2023-12-22 VITALS
WEIGHT: 230 LBS | HEART RATE: 62 BPM | HEIGHT: 73 IN | DIASTOLIC BLOOD PRESSURE: 48 MMHG | OXYGEN SATURATION: 97 % | SYSTOLIC BLOOD PRESSURE: 106 MMHG | BODY MASS INDEX: 30.48 KG/M2 | RESPIRATION RATE: 15 BRPM

## 2023-12-22 DIAGNOSIS — I10 ESSENTIAL HYPERTENSION: ICD-10-CM

## 2023-12-22 DIAGNOSIS — I48.0 PAROXYSMAL ATRIAL FIBRILLATION (HCC): ICD-10-CM

## 2023-12-22 DIAGNOSIS — I50.22 HEART FAILURE WITH MILDLY REDUCED EJECTION FRACTION (HCC): ICD-10-CM

## 2023-12-22 DIAGNOSIS — I25.10 CORONARY ARTERY DISEASE INVOLVING NATIVE HEART WITHOUT ANGINA PECTORIS, UNSPECIFIED VESSEL OR LESION TYPE: ICD-10-CM

## 2023-12-22 DIAGNOSIS — E78.2 MIXED HYPERLIPIDEMIA: ICD-10-CM

## 2023-12-22 LAB — EKG IMPRESSION: NORMAL

## 2023-12-22 PROCEDURE — 94618 PULMONARY STRESS TESTING: CPT | Mod: 26 | Performed by: INTERNAL MEDICINE

## 2023-12-22 PROCEDURE — 99213 OFFICE O/P EST LOW 20 MIN: CPT | Mod: 25 | Performed by: INTERNAL MEDICINE

## 2023-12-22 PROCEDURE — 3078F DIAST BP <80 MM HG: CPT | Performed by: INTERNAL MEDICINE

## 2023-12-22 PROCEDURE — 99215 OFFICE O/P EST HI 40 MIN: CPT | Mod: 25 | Performed by: INTERNAL MEDICINE

## 2023-12-22 PROCEDURE — 3074F SYST BP LT 130 MM HG: CPT | Performed by: INTERNAL MEDICINE

## 2023-12-22 PROCEDURE — 93005 ELECTROCARDIOGRAM TRACING: CPT | Performed by: INTERNAL MEDICINE

## 2023-12-22 PROCEDURE — 94618 PULMONARY STRESS TESTING: CPT | Performed by: INTERNAL MEDICINE

## 2023-12-22 PROCEDURE — 93010 ELECTROCARDIOGRAM REPORT: CPT | Performed by: INTERNAL MEDICINE

## 2023-12-22 RX ORDER — SACUBITRIL AND VALSARTAN 24; 26 MG/1; MG/1
1 TABLET, FILM COATED ORAL 2 TIMES DAILY
Qty: 180 TABLET | Refills: 3 | Status: SHIPPED | OUTPATIENT
Start: 2023-12-22 | End: 2024-01-04

## 2023-12-22 ASSESSMENT — FIBROSIS 4 INDEX: FIB4 SCORE: 2.41

## 2023-12-22 ASSESSMENT — 6 MINUTE WALK TEST (6MWT): TOTAL DISTANCE WALKED (METERS): 225.55

## 2023-12-22 NOTE — ASSESSMENT & PLAN NOTE
Regarding his paroxysmal atrial fibrillation he appears to be in sinus rhythm today on his EKG.  He will continue with ongoing Eliquis therapy at 5 mg twice daily.

## 2023-12-22 NOTE — ASSESSMENT & PLAN NOTE
Regarding his coronary artery disease he remains on appropriate medical therapy for secondary prevention.  No changes are made at this time

## 2023-12-22 NOTE — ASSESSMENT & PLAN NOTE
Blood pressures currently well-controlled on his current regimen no changes are made to his medical therapy aside from transitioning from lisinopril therapy to Entresto therapy

## 2023-12-22 NOTE — PROGRESS NOTES
[facility]    PatientName:Carl HighelsDate: 2023  :1945    78 y.o.PCP:Zuleyma Cuevas M.D.  MRN:[id number]          Problems and Plans    Essential hypertension  Blood pressures currently well-controlled on his current regimen no changes are made to his medical therapy aside from transitioning from lisinopril therapy to Entresto therapy    Coronary artery disease involving native heart without angina pectoris  Regarding his coronary artery disease he remains on appropriate medical therapy for secondary prevention.  No changes are made at this time    Mixed hyperlipidemia  Regarding his dyslipidemia he remains on statin therapy and is also closely monitoring his diet.    Paroxysmal atrial fibrillation (HCC)  Regarding his paroxysmal atrial fibrillation he appears to be in sinus rhythm today on his EKG.  He will continue with ongoing Eliquis therapy at 5 mg twice daily.    Heart failure with mildly reduced ejection fraction (HCC)  Clinically, he is doing well and is largely New York Heart Association class I.  At this time he will be transition from lisinopril therapy to Entresto therapy consisting of 24/26 mg twice daily and this will be started 36 hours or greater from the last lisinopril dosing.  [facility] will be mailed directly to his home.  He is aware of the need to not start his Entresto therapy until his lisinopril therapy has completely washed out.  He will continue with his ongoing metoprolol therapy 50 mg daily, spironolactone 25 mg daily, Jardiance 12.5 mg daily and will incorporate Entresto therapy into his regimen.  He will continue to monitor his weight on a daily basis.  I have encouraged him to pursue exercise.  At this time there is no role for device therapy however patient does have likely multifactorial etiology consisting of an ischemic component with both microvascular and macrovascular  disease, conduction disease consisting of a chronic left bundle branch block and underlying diastolic dysfunction.  We will make further recommendations pending his next echocardiogram    Return in about 4 months (around 4/22/2024).      Encounter    Reason for Visit / Chief Complaint: Heart failure with reduced ejection fraction    HPI    78-year-old male with known history of heart failure with reduced ejection fraction, coronary artery disease with prior drug-eluting stent placed into the right coronary artery consisting of a Xience 2.25 x 12 mm postdilated to 2.5 mm, hypertension, dyslipidemia, osteoarthritis, prior TIA, carotid artery disease presents in clinic for ongoing management of his heart failure with reduced ejection fraction.    Currently, he notes that he is feeling well and not having significant cardiovascular complaints.  He does note that he does have some fatigue by approximately 1300 hrs. daily in which he often times will enjoy watching TV elevation at that time.  He denies any chest pain palpitations dyspnea, PND, decreased appetite, lower extremity edema.  He was recently started on Jardiance therapy and is doing well.  He is accompanied by his wife who reiterates similar history.    Most recent echocardiogram was performed on 11/2/2023 which demonstrates mild reduction in LV systolic function with estimate ejection fraction of 35% with global hypokinesis, grade 1 diastolic dysfunction with normal left atrium mild mitral valve regurgitation    6-minute walk time consisted of a total test duration of 6 minutes completing 225.5 m without having any adverse symptoms.  Appropriate heart rate response as well as slight increase in fatigue from 2 until 3.    Past Medical History  Past Medical History:   Diagnosis Date    Afib (HCC)     Atrial fibrillation (HCC)     Hyperlipidemia     Hypertension     Stroke (HCC)      Past Surgical History  History reviewed. No pertinent surgical history.  Social  "History  Social History     Socioeconomic History    Marital status:      Spouse name: Not on file    Number of children: Not on file    Years of education: Not on file    Highest education level: Not on file   Occupational History    Not on file   Tobacco Use    Smoking status: Former    Smokeless tobacco: Never   Vaping Use    Vaping Use: Never used   Substance and Sexual Activity    Alcohol use: Yes     Comment: 2 drinks/night    Drug use: Never    Sexual activity: Not Currently   Other Topics Concern    Not on file   Social History Narrative    Not on file     Social Determinants of Health     Financial Resource Strain: Low Risk  (4/29/2022)    Overall Financial Resource Strain (CARDIA)     Difficulty of Paying Living Expenses: Not hard at all   Food Insecurity: No Food Insecurity (4/29/2022)    Hunger Vital Sign     Worried About Running Out of Food in the Last Year: Never true     Ran Out of Food in the Last Year: Never true   Transportation Needs: No Transportation Needs (4/29/2022)    PRAPARE - Transportation     Lack of Transportation (Medical): No     Lack of Transportation (Non-Medical): No   Physical Activity: Not on file   Stress: Not on file   Social Connections: Not on file   Intimate Partner Violence: Not on file   Housing Stability: Not on file     Past Family History  History reviewed. No pertinent family history.  Medication(s)  [unfilled]  Allergies  Patient has no known allergies.    Review of Systems    A comprehensive 10 system review was conducted and is negative except as noted above in the HPI or here.      Vital Signs  /48 (BP Location: Left arm, Patient Position: Sitting, BP Cuff Size: Adult)   Pulse 62   Resp 15   Ht 1.854 m (6' 1\")   Wt 104 kg (230 lb)   SpO2 97%   BMI 30.34 kg/m²     Physical Exam  Constitutional:       Appearance: Normal appearance. He is obese.   HENT:      Head: Normocephalic and atraumatic.      Mouth/Throat:      Mouth: Mucous membranes " "are moist.      Pharynx: Oropharynx is clear.   Eyes:      Extraocular Movements: Extraocular movements intact.      Conjunctiva/sclera: Conjunctivae normal.   Cardiovascular:      Rate and Rhythm: Normal rate and regular rhythm.      Pulses: Normal pulses.      Heart sounds: Normal heart sounds. No murmur heard.     No friction rub. No gallop.   Pulmonary:      Effort: Pulmonary effort is normal.      Breath sounds: Normal breath sounds.   Abdominal:      General: Bowel sounds are normal.      Palpations: Abdomen is soft.   Musculoskeletal:         General: Normal range of motion.      Cervical back: Normal range of motion and neck supple.   Skin:     General: Skin is warm and dry.   Neurological:      General: No focal deficit present.      Mental Status: He is alert and oriented to person, place, and time. Mental status is at baseline.   Psychiatric:         Mood and Affect: Mood normal.         Behavior: Behavior normal.         Thought Content: Thought content normal.         Judgment: Judgment normal.         Lab Results   Component Value Date/Time    TSHULTRASEN 2.010 08/02/2021 2004      No results found for: \"FREET4\"     Lab Results   Component Value Date/Time    HBA1C 5.9 (H) 04/18/2023 08:38 AM       Lab Results   Component Value Date/Time    CHOLSTRLTOT 98 (L) 04/25/2022 01:31 AM    LDL 30 04/25/2022 01:31 AM    HDL 59 04/25/2022 01:31 AM    TRIGLYCERIDE 44 04/25/2022 01:31 AM         Lab Results   Component Value Date/Time    SODIUM 134 (L) 07/08/2022 10:23 AM    SODIUM 134 (L) 06/06/2022 10:16 AM    POTASSIUM 3.9 07/08/2022 10:23 AM    POTASSIUM 4.1 06/06/2022 10:16 AM    CHLORIDE 106 07/08/2022 10:23 AM    CHLORIDE 98 06/06/2022 10:16 AM    CO2 25.0 07/08/2022 10:23 AM    CO2 25 06/06/2022 10:16 AM    GLUCOSE 168 (H) 04/18/2023 08:38 AM    BUN 12.0 07/08/2022 10:23 AM    BUN 13 06/06/2022 10:16 AM    CREATININE 0.8 07/08/2022 10:23 AM    CREATININE 0.79 06/06/2022 10:16 AM    BUNCREATRAT 15.0 " 07/08/2022 10:23 AM       Lab Results   Component Value Date/Time    ALKPHOSPHAT 80 07/08/2022 10:23 AM    ALKPHOSPHAT 85 04/25/2022 01:31 AM    ASTSGOT 36 07/08/2022 10:23 AM    ASTSGOT 38 04/25/2022 01:31 AM    ALTSGPT 32 07/08/2022 10:23 AM    ALTSGPT 31 04/25/2022 01:31 AM    TBILIRUBIN 0.5 07/08/2022 10:23 AM    TBILIRUBIN 0.4 04/25/2022 01:31 AM         Imaging  EKG demonstrates sinus rhythm with intermittent PVCs and left bundle branch block with a noted QRS duration of 140 ms.  I reviewed interpreted EKG.  Findings are discussed with the patient    Echocardiogram  Pending    Total patient time was estimated to be 45 minutes consisting of chart review, direct patient interaction, medication renewal, plan development and overall communication with the cardiovascular team.        Electronically signed by:   Cuauhtemoc Argueta DO, MPH  [facility]    Portions of this note were completed using voice recognition software (Dragon Naturally speaking software) . Occasional transcription errors may have escaped proof reading. I have made every reasonable attempt to correct obvious errors, but I expect that there are errors of grammar and possibly content that I did not discover before finalizing the note.

## 2023-12-22 NOTE — PATIENT INSTRUCTIONS
Follow up in 4 months  Echocardiogram in 3 months  Stop Lisinopril and 3 days later start Entesto 24/26 mg twice day  Continue current medications  Call with questions.

## 2023-12-22 NOTE — ASSESSMENT & PLAN NOTE
Clinically, he is doing well and is largely New York Heart Association class I.  At this time he will be transition from lisinopril therapy to Entresto therapy consisting of 24/26 mg twice daily and this will be started 36 hours or greater from the last lisinopril dosing.  [facility] will be mailed directly to his home.  He is aware of the need to not start his Entresto therapy until his lisinopril therapy has completely washed out.  He will continue with his ongoing metoprolol therapy 50 mg daily, spironolactone 25 mg daily, Jardiance 12.5 mg daily and will incorporate Entresto therapy into his regimen.  He will continue to monitor his weight on a daily basis.  I have encouraged him to pursue exercise.  At this time there is no role for device therapy however patient does have likely multifactorial etiology consisting of an ischemic component with both microvascular and macrovascular disease, conduction disease consisting of a chronic left bundle branch block and underlying diastolic dysfunction.  We will make further recommendations pending his next echocardiogram

## 2024-01-04 ENCOUNTER — HOSPITAL ENCOUNTER (EMERGENCY)
Facility: MEDICAL CENTER | Age: 79
End: 2024-01-04
Attending: EMERGENCY MEDICINE
Payer: MEDICARE

## 2024-01-04 ENCOUNTER — APPOINTMENT (OUTPATIENT)
Dept: RADIOLOGY | Facility: MEDICAL CENTER | Age: 79
End: 2024-01-04
Attending: EMERGENCY MEDICINE
Payer: MEDICARE

## 2024-01-04 VITALS
TEMPERATURE: 97.1 F | SYSTOLIC BLOOD PRESSURE: 119 MMHG | WEIGHT: 230 LBS | RESPIRATION RATE: 16 BRPM | DIASTOLIC BLOOD PRESSURE: 58 MMHG | BODY MASS INDEX: 30.48 KG/M2 | HEART RATE: 70 BPM | HEIGHT: 73 IN | OXYGEN SATURATION: 90 %

## 2024-01-04 DIAGNOSIS — G45.9 TIA (TRANSIENT ISCHEMIC ATTACK): ICD-10-CM

## 2024-01-04 DIAGNOSIS — R47.01 APHASIA: ICD-10-CM

## 2024-01-04 LAB
ABO GROUP BLD: NORMAL
ALBUMIN SERPL BCP-MCNC: 4.2 G/DL (ref 3.2–4.9)
ALBUMIN/GLOB SERPL: 1.4 G/DL
ALP SERPL-CCNC: 100 U/L (ref 30–99)
ALT SERPL-CCNC: 21 U/L (ref 2–50)
ANION GAP SERPL CALC-SCNC: 12 MMOL/L (ref 7–16)
APTT PPP: 34.6 SEC (ref 24.7–36)
AST SERPL-CCNC: 21 U/L (ref 12–45)
BASOPHILS # BLD AUTO: 0.7 % (ref 0–1.8)
BASOPHILS # BLD: 0.05 K/UL (ref 0–0.12)
BILIRUB SERPL-MCNC: 0.6 MG/DL (ref 0.1–1.5)
BLD GP AB SCN SERPL QL: NORMAL
BUN SERPL-MCNC: 11 MG/DL (ref 8–22)
CALCIUM ALBUM COR SERPL-MCNC: 8.9 MG/DL (ref 8.5–10.5)
CALCIUM SERPL-MCNC: 9.1 MG/DL (ref 8.5–10.5)
CHLORIDE SERPL-SCNC: 103 MMOL/L (ref 96–112)
CO2 SERPL-SCNC: 23 MMOL/L (ref 20–33)
CREAT SERPL-MCNC: 0.83 MG/DL (ref 0.5–1.4)
EKG IMPRESSION: NORMAL
EOSINOPHIL # BLD AUTO: 0.41 K/UL (ref 0–0.51)
EOSINOPHIL NFR BLD: 5.4 % (ref 0–6.9)
ERYTHROCYTE [DISTWIDTH] IN BLOOD BY AUTOMATED COUNT: 43.2 FL (ref 35.9–50)
GFR SERPLBLD CREATININE-BSD FMLA CKD-EPI: 89 ML/MIN/1.73 M 2
GLOBULIN SER CALC-MCNC: 3.1 G/DL (ref 1.9–3.5)
GLUCOSE BLD STRIP.AUTO-MCNC: 115 MG/DL (ref 65–99)
GLUCOSE SERPL-MCNC: 123 MG/DL (ref 65–99)
HCT VFR BLD AUTO: 42.9 % (ref 42–52)
HGB BLD-MCNC: 14.5 G/DL (ref 14–18)
IMM GRANULOCYTES # BLD AUTO: 0.03 K/UL (ref 0–0.11)
IMM GRANULOCYTES NFR BLD AUTO: 0.4 % (ref 0–0.9)
INR PPP: 1.19 (ref 0.87–1.13)
LYMPHOCYTES # BLD AUTO: 1.75 K/UL (ref 1–4.8)
LYMPHOCYTES NFR BLD: 23.2 % (ref 22–41)
MCH RBC QN AUTO: 32.3 PG (ref 27–33)
MCHC RBC AUTO-ENTMCNC: 33.8 G/DL (ref 32.3–36.5)
MCV RBC AUTO: 95.5 FL (ref 81.4–97.8)
MONOCYTES # BLD AUTO: 0.78 K/UL (ref 0–0.85)
MONOCYTES NFR BLD AUTO: 10.3 % (ref 0–13.4)
NEUTROPHILS # BLD AUTO: 4.52 K/UL (ref 1.82–7.42)
NEUTROPHILS NFR BLD: 60 % (ref 44–72)
NRBC # BLD AUTO: 0 K/UL
NRBC BLD-RTO: 0 /100 WBC (ref 0–0.2)
PLATELET # BLD AUTO: 234 K/UL (ref 164–446)
PMV BLD AUTO: 9.8 FL (ref 9–12.9)
POTASSIUM SERPL-SCNC: 4.3 MMOL/L (ref 3.6–5.5)
PROT SERPL-MCNC: 7.3 G/DL (ref 6–8.2)
PROTHROMBIN TIME: 15.2 SEC (ref 12–14.6)
RBC # BLD AUTO: 4.49 M/UL (ref 4.7–6.1)
RH BLD: NORMAL
SODIUM SERPL-SCNC: 138 MMOL/L (ref 135–145)
TROPONIN T SERPL-MCNC: 33 NG/L (ref 6–19)
WBC # BLD AUTO: 7.5 K/UL (ref 4.8–10.8)

## 2024-01-04 PROCEDURE — 85025 COMPLETE CBC W/AUTO DIFF WBC: CPT

## 2024-01-04 PROCEDURE — 85610 PROTHROMBIN TIME: CPT

## 2024-01-04 PROCEDURE — 700117 HCHG RX CONTRAST REV CODE 255: Performed by: EMERGENCY MEDICINE

## 2024-01-04 PROCEDURE — 70450 CT HEAD/BRAIN W/O DYE: CPT

## 2024-01-04 PROCEDURE — 86901 BLOOD TYPING SEROLOGIC RH(D): CPT

## 2024-01-04 PROCEDURE — 86900 BLOOD TYPING SEROLOGIC ABO: CPT

## 2024-01-04 PROCEDURE — 0042T CT-CEREBRAL PERFUSION ANALYSIS: CPT

## 2024-01-04 PROCEDURE — 70498 CT ANGIOGRAPHY NECK: CPT

## 2024-01-04 PROCEDURE — 84484 ASSAY OF TROPONIN QUANT: CPT

## 2024-01-04 PROCEDURE — 36415 COLL VENOUS BLD VENIPUNCTURE: CPT

## 2024-01-04 PROCEDURE — 80053 COMPREHEN METABOLIC PANEL: CPT

## 2024-01-04 PROCEDURE — 71045 X-RAY EXAM CHEST 1 VIEW: CPT

## 2024-01-04 PROCEDURE — 85730 THROMBOPLASTIN TIME PARTIAL: CPT

## 2024-01-04 PROCEDURE — 99285 EMERGENCY DEPT VISIT HI MDM: CPT

## 2024-01-04 PROCEDURE — 82962 GLUCOSE BLOOD TEST: CPT

## 2024-01-04 PROCEDURE — 94760 N-INVAS EAR/PLS OXIMETRY 1: CPT

## 2024-01-04 PROCEDURE — 86850 RBC ANTIBODY SCREEN: CPT

## 2024-01-04 PROCEDURE — 93005 ELECTROCARDIOGRAM TRACING: CPT | Performed by: EMERGENCY MEDICINE

## 2024-01-04 PROCEDURE — 70496 CT ANGIOGRAPHY HEAD: CPT

## 2024-01-04 RX ORDER — EMPAGLIFLOZIN 25 MG/1
12.5 TABLET, FILM COATED ORAL DAILY
COMMUNITY

## 2024-01-04 RX ORDER — LEVETIRACETAM 500 MG/1
500 TABLET ORAL 2 TIMES DAILY
Qty: 60 TABLET | Refills: 0 | Status: SHIPPED | OUTPATIENT
Start: 2024-01-04

## 2024-01-04 RX ORDER — LISINOPRIL AND HYDROCHLOROTHIAZIDE 25; 20 MG/1; MG/1
1 TABLET ORAL DAILY
Status: SHIPPED | COMMUNITY
End: 2024-02-22

## 2024-01-04 RX ORDER — METOPROLOL SUCCINATE 25 MG/1
25 TABLET, EXTENDED RELEASE ORAL DAILY
COMMUNITY

## 2024-01-04 RX ADMIN — IOHEXOL 40 ML: 350 INJECTION, SOLUTION INTRAVENOUS at 13:09

## 2024-01-04 RX ADMIN — IOHEXOL 100 ML: 350 INJECTION, SOLUTION INTRAVENOUS at 13:06

## 2024-01-04 ASSESSMENT — PAIN DESCRIPTION - PAIN TYPE: TYPE: ACUTE PAIN

## 2024-01-04 ASSESSMENT — FIBROSIS 4 INDEX: FIB4 SCORE: 2.41

## 2024-01-04 NOTE — DISCHARGE INSTRUCTIONS
It is possible you are having small partial seizures as the cause of your aphasia however this does not require inpatient evaluation.  Neurology recommended that you start Keppra twice a day as prescribed.  Please follow-up with your neurologist as this may be of benefit and may need to be continued.

## 2024-01-04 NOTE — ED NOTES
Med rec completed per pt and home pharmacy ([facility])   Allergies reviewed  No PO antibiotics in the last 30 days    Pt takes Eliquis 5 mg twice a day   Last dose 1/4/2023 AM

## 2024-01-04 NOTE — PROGRESS NOTES
Contacted by ED attending to discuss patient case. Patient is a 79 y/o M w/ history of stroke in 2012 (Lt-parietal) 2/2 prior left carotid stenosis and is s/p CEA and recurrent TIA/speech alteration events. Patient is on Eliquis 5 mg BID and aspirin for cardiovascular disease treatment. Patient presented today with recurrent speech changes, inability to get words out, slurred speech. Follow up acute head imaging without vessel occlusion, no perfusion changes, no significant stenosis. In the setting of patients dual antithrombotic therapy there would be no indication for either a third agent or change of his medication regimen. In the setting of recurrent stereotyped neurologic events in the setting of aggressive stroke risk factor management I raised the prospect that patient may in fact be having seizures as an alternate explanation. I recommended ED consider treating with 500 mg BID Keppra and arranging outpatient follow up with neurology to discuss duration and spell prevention. Patient's last ambulatory EEG in 2021 failed to demonstrate epileptiform activity. I do not think an MRI brain is indicated in this setting as it would not change his acute or chronic management but could be pursued if primary team deemed it necessary.

## 2024-01-04 NOTE — ED PROVIDER NOTES
ED Provider Note    CHIEF COMPLAINT  Chief Complaint   Patient presents with    Slurred Speech     Pt reports episode of speech difficulty/expressive aphasia, beginning approx 30 min ago. States sx resolved about 5 min PTA however during triage pt reports episode occurring again. -facial droop, -unilateral weakness, -dizziness, -vision changes, -balance changes. Hx CVA 2012, multiple TIAs in the last year. Takes eliquis & baby ASA daily.        HPI  Carl Blount is a 78 y.o. male who presents for evaluation of expressive aphasia which started approximately 40 minutes prior to arrival.  It is almost completely resolved by the time of arrival however patient notes this has happened to him in the past.  He thinks it was about a year and a half ago when he had similar symptoms and was hospitalized.  He notes no numbness, tingling, or focal motor weakness and has had no diplopia or blurry vision.  He notes no difficulty swallowing.  Patient takes both aspirin and Eliquis daily.  EXTERNAL RECORDS REVIEWED  Reviewed last visit to [facility] cardiology December 22, 2023.  Patient has the diagnosis of essential hypertension, coronary artery disease on medical therapy, hyperlipidemia, paroxysmal atrial fibrillation, and heart failure with mildly reduced ejection fraction.  Noted last neurology note from November of last year.  That note indicated that patient had an episode approximately 2 months prior to the visit of difficulty getting words out without any changes in awareness.  Patient was not seen at that time.  Prior to that the last episode was April 2022.  He has had an left-sided endarterectomy in the past but does have some right carotid stenosis which is being watched by vascular surgery and treated medically.  ROS  Constitutional: No fevers or chills  Skin: No rashes  HEENT: No sore throat, runny nose  Neck: No neck pain  Chest: No pain or rashes  Pulm: No shortness of breath, cough, wheezing, stridor, or  pain with inspiration/expiration  Gastrointestinal: No nausea, vomiting, diarrhea, constipation, bloating, melena, hematochezia or abdominal pain.  Genitourinary: No dysuria or hematuria  Musculoskeletal: No pain, swelling, or focal weakness  Neurologic: Difficulty with speech.  No sensory or focal motor changes to extremities. No confusion or disorientation.  Heme: No bleeding or bruising problems.   Immuno: No hx of recurrent infections        LIMITATION TO HISTORY   None   OUTSIDE HISTORIAN(S):  None        PAST FAM HISTORY  History reviewed. No pertinent family history.    PAST MEDICAL HISTORY   has a past medical history of Afib (HCC), Atrial fibrillation (HCC), Hyperlipidemia, Hypertension, and Stroke (HCC).    SOCIAL HISTORY  Social History     Tobacco Use    Smoking status: Former    Smokeless tobacco: Never   Vaping Use    Vaping Use: Never used   Substance and Sexual Activity    Alcohol use: Yes     Comment: 2 drinks/night    Drug use: Never    Sexual activity: Not Currently       SURGICAL HISTORY  Carotid endarterectomy    CURRENT MEDICATIONS  Home Medications       Reviewed by Jose Alvarez (Pharmacy Tech) on 01/04/24 at 1444  Med List Status: Complete     Medication Last Dose Status   apixaban (ELIQUIS) 5mg Tab 1/4/2024 Active   aspirin 81 MG EC tablet 1/4/2024 Active   atorvastatin (LIPITOR) 40 MG Tab 1/3/2024 Active   cyanocobalamin (VITAMIN B12) 1000 MCG Tab 1/3/2024 Active   cyclobenzaprine (FLEXERIL) 10 MG Tab 1/3/2024 Active   diclofenac sodium (VOLTAREN) 1 % Gel 1/3/2024 Active   Empagliflozin (JARDIANCE) 25 MG Tab 1/3/2024 Active   finasteride (PROSCAR) 5 MG Tab 1/3/2024 Active   hydroxychloroquine (PLAQUENIL) 200 MG Tab 1/4/2024 Active   lidocaine (LIDODERM) 5 % Patch FEW DAYS AGO Active   lisinopril-hydrochlorothiazide (PRINZIDE) 20-25 MG per tablet 1/4/2024 Active   loratadine (CLARITIN) 10 MG Tab 1/4/2024 Active   metoprolol SR (TOPROL XL) 25 MG TABLET SR 24 HR 1/4/2024 Active  "  Mirabegron ER 50 MG TABLET SR 24 HR 1/3/2024 Active   Omega-3 Fatty Acids (FISH OIL) 1000 MG Cap capsule 1/4/2024 Active   pantoprazole (PROTONIX) 40 MG Tablet Delayed Response 1/4/2024 Active   spironolactone (ALDACTONE) 25 MG Tab 1/4/2024 Active   tamsulosin (FLOMAX) 0.4 MG capsule 1/4/2024 Active   therapeutic multivitamin-minerals (THERAGRAN-M) Tab 1/4/2024 Active   traZODone (DESYREL) 100 MG Tab 1/3/2024 Active   vitamin D3 (CHOLECALCIFEROL) 1000 Unit (25 mcg) Tab 1/4/2024 Active                     ALLERGIES  No Known Allergies    PHYSICAL EXAM  VITAL SIGNS: /58   Pulse 70   Temp 36.2 °C (97.1 °F)   Resp 16   Ht 1.854 m (6' 1\")   Wt 104 kg (230 lb)   SpO2 90%   BMI 30.34 kg/m²    Gen: Alert in no apparent distress.  HEENT: No signs of trauma, Bilateral external ears normal, Nose normal. Conjunctiva normal, Non-icteric.   Neck:  No tenderness, Supple, No masses  Lymphatic: No cervical lymphadenopathy noted.   Cardiovascular: Regular rate and rhythm, no murmurs.  Capillary refill less than 3 seconds to all extremities, 2+ distal pulses.  Thorax & Lungs: Normal breath sounds, No respiratory distress, No wheezing bilateral chest rise  Abdomen: Bowel sounds normal, Soft, No tenderness, No masses, No pulsatile masses. No Guarding or rebound  Skin: Warm, Dry, No erythema, No rash noted to exposed areas.   Back: No bony tenderness, No CVA tenderness.   Extremities: Intact distal pulses, No edema  Neurologic: Alert , no facial droop, grossly normal coordination and strength  CN 2: Visual acuity grossly intact, visual fields grossly intact  CN 2-3: Pupils equal round reactive to light and accommodation  CN 3, 4, 6: Extraocular eye movements intact, no lid lag  CN 5: Facial sensation intact to light touch bilaterally  CN 7: Face symmetric, no droop  CN 8: Hearing intact to finger rub bilaterally  CN 9,10: Swallowing normally, soft palate elevates normally  CN 4, 7, 10, 12: Very mild word finding " difficulty.  Voice normal, no dysarthria  CN 11: Strong shoulder shrug, good strength with head rotation  CN 12: No deviation of the tongue    Motor:   RUE: 5 out of 5 strength proximally and distally, no pronator or arm drift  LUE:5 out of 5 strength proximally and distally, no pronator or arm drift  RLE:5 out of 5 strength proximally and distally, no leg drift  LLE:5 out of 5 strength proximally and distally, no leg drift    Sensory:  RUE: Sensation intact to light touch, equal bilat  RLE:  Sensation intact to light touch, equal bilat  LUE: Sensation intact to light touch, equal bilat  LLE: Sensation intact to light touch, equal bilat   Initial NIH at charge desk - 1  Psychiatric: Affect pleasant    INITIAL IMPRESSION  Patient arrived for evaluation of what appears to be recurrent, if only yearly, changes in speech.  Appears to be mostly an expressive issue however it is currently resolving.  Patient will be taken to CT for imaging to rule out large metal occlusion, which seems unlikely given the symptoms, but needs to be ruled out.  Case was discussed at 1250 with the on-call vascular neurologist who agreed with the initial plan.  This does not need to be a code stroke at this point as his symptoms are resolving and very minor.  As the patient is on aspirin and Eliquis, he is not a candidate for tenecteplase anyway.  A possibility that the patient is having small partial seizures causing the speech problem was raised.  Patient has had negative workups for similar symptoms in the past and it was felt that admission for MRI would not  even if a small stroke was seen as the patient is already on maximal medical therapy and his blood pressure is fairly well-controlled.  Based on this he suggested starting Keppra twice daily at home and following up with neurology.  Will discuss this with the patient and his family member when they return from CT.  I  ED observation? No    LABS  Results for orders  placed or performed during the hospital encounter of 01/04/24   CBC WITH DIFFERENTIAL   Result Value Ref Range    WBC 7.5 4.8 - 10.8 K/uL    RBC 4.49 (L) 4.70 - 6.10 M/uL    Hemoglobin 14.5 14.0 - 18.0 g/dL    Hematocrit 42.9 42.0 - 52.0 %    MCV 95.5 81.4 - 97.8 fL    MCH 32.3 27.0 - 33.0 pg    MCHC 33.8 32.3 - 36.5 g/dL    RDW 43.2 35.9 - 50.0 fL    Platelet Count 234 164 - 446 K/uL    MPV 9.8 9.0 - 12.9 fL    Neutrophils-Polys 60.00 44.00 - 72.00 %    Lymphocytes 23.20 22.00 - 41.00 %    Monocytes 10.30 0.00 - 13.40 %    Eosinophils 5.40 0.00 - 6.90 %    Basophils 0.70 0.00 - 1.80 %    Immature Granulocytes 0.40 0.00 - 0.90 %    Nucleated RBC 0.00 0.00 - 0.20 /100 WBC    Neutrophils (Absolute) 4.52 1.82 - 7.42 K/uL    Lymphs (Absolute) 1.75 1.00 - 4.80 K/uL    Monos (Absolute) 0.78 0.00 - 0.85 K/uL    Eos (Absolute) 0.41 0.00 - 0.51 K/uL    Baso (Absolute) 0.05 0.00 - 0.12 K/uL    Immature Granulocytes (abs) 0.03 0.00 - 0.11 K/uL    NRBC (Absolute) 0.00 K/uL   COMP METABOLIC PANEL   Result Value Ref Range    Sodium 138 135 - 145 mmol/L    Potassium 4.3 3.6 - 5.5 mmol/L    Chloride 103 96 - 112 mmol/L    Co2 23 20 - 33 mmol/L    Anion Gap 12.0 7.0 - 16.0    Glucose 123 (H) 65 - 99 mg/dL    Bun 11 8 - 22 mg/dL    Creatinine 0.83 0.50 - 1.40 mg/dL    Calcium 9.1 8.5 - 10.5 mg/dL    Correct Calcium 8.9 8.5 - 10.5 mg/dL    AST(SGOT) 21 12 - 45 U/L    ALT(SGPT) 21 2 - 50 U/L    Alkaline Phosphatase 100 (H) 30 - 99 U/L    Total Bilirubin 0.6 0.1 - 1.5 mg/dL    Albumin 4.2 3.2 - 4.9 g/dL    Total Protein 7.3 6.0 - 8.2 g/dL    Globulin 3.1 1.9 - 3.5 g/dL    A-G Ratio 1.4 g/dL   PROTHROMBIN TIME   Result Value Ref Range    PT 15.2 (H) 12.0 - 14.6 sec    INR 1.19 (H) 0.87 - 1.13   APTT   Result Value Ref Range    APTT 34.6 24.7 - 36.0 sec   COD (ADULT)   Result Value Ref Range    ABO Grouping Only A     Rh Grouping Only POS     Antibody Screen-Cod NEG    TROPONIN   Result Value Ref Range    Troponin T 33 (H) 6 - 19 ng/L    ESTIMATED GFR   Result Value Ref Range    GFR (CKD-EPI) 89 >60 mL/min/1.73 m 2   EKG (NOW)   Result Value Ref Range    Report       [facility] Emergency Dept.    Test Date:  2024  Pt Name:    SHANDRA JOYNER               Department: ER  MRN:        [id number]                      Room:       RD 01  Gender:     Male                         Technician: [id number]  :        1945                   Requested By:CLIFFORD YODER  Order #:    [id number]                    Reading MD:    Measurements  Intervals                                Axis  Rate:       76                           P:          -22  AK:         206                          QRS:        -62  QRSD:       131                          T:          89  QT:         421  QTc:        474    Interpretive Statements  Sinus rhythm  Ventricular premature complex  Left bundle branch block  Compared to ECG 2023 08:56:03  Sinus arrhythmia no longer present     POCT glucose device results   Result Value Ref Range    POC Glucose, Blood 115 (H) 65 - 99 mg/dL     I have independently interpreted this EKG  Sinus rhythm rate of 76, AK intervals slightly prolonged at 206, other intervals appear to be within normal limits but there is a left bundle branch block.  There is no ectopy and no convincing findings to suggest ischemia.  Compared to EKG performed at the end of December of last year, there are no convincing changes  I have independently interpreted the diagnostic imaging associated with this visit and am waiting the final reading from the radiologist.   My preliminary interpretation is a follows: Single view chest x-ray: No pulmonary opacities to suggest infiltrates or effusions.  Cardiomediastinal silhouette appears to be within normal limits.  RADIOLOGY  DX-CHEST-PORTABLE (1 VIEW)   Final Result      No acute cardiopulmonary abnormality.      CT-CEREBRAL PERFUSION ANALYSIS   Final Result      1.  Cerebral blood flow less than 30%  likely representing completed infarct = 0 mL.      2.  T Max more than 6 seconds likely representing combination of completed infarct and ischemia = 34 mL.      3.  Please note that the cerebral perfusion was performed on the limited brain tissue around the basal ganglia region. Infarct/ischemia outside the CT perfusion sections can be missed in this study.      CT-CTA NECK WITH & W/O-POST PROCESSING   Final Result      1.  RIGHT cervical carotid bifurcation and right internal carotid artery prominent atherosclerotic calcific plaque with less than 50% stenosis by NASCET criteria.   2.  LEFT cervical carotid bifurcation and left internal carotid artery minimal atherosclerotic calcification at no flow-limiting stenosis.   3.  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 This was also noted on the previous exam from 4/24/2022      CT-CTA HEAD WITH & W/O-POST PROCESS   Final Result      No intracranial aneurysm, focal high-grade stenosis, or abrupt large vessel cut off.      CT-HEAD W/O   Final Result      1.  Diffuse atrophy and white matter changes.   2.  No acute intracranial hemorrhage or territorial infarct.   3.  Chronic LEFT MCA distribution infarct with compensatory enlargement of LEFT lateral ventricle.             Critical Care Note  Upon my evaluation, this patient had high probability of imminent and life-threatening deterioration due to TIA, which required my direct attention, intervention, and personal management. I personally provided 35 minutes of critical care time exclusive of time spent on separately billable procedures. Time includes review of laboratory data, radiology results, discussion with consultants, and monitoring for potential decompensation.          COURSE & MEDICAL DECISION MAKING  Pertinent Labs & Imaging studies reviewed. (See chart for details)  2:30 PM  Discussed findings with the patient.  Also discussed the discussion with the vascular neurologist.  Neurologist noted  that there is artifact on the perfusion scan which is nonconcerning for any acute issue.  Patient has completely recovered and repeat physical exam at bedside demonstrates an NIH score of 0.  He is no longer having difficulty speaking.  He states understanding of the possibility of a TIA and I offered admission for MRI but, like the neurologist, I feel this will not result in any meaningful benefit to the patient as even if a small stroke was seen, he is already on maximal therapy.  The only significant issue that may need to be dealt with is the right carotid stenosis.  It is unclear if he is having repeated episodes regarding this issue but I feel he can safely follow-up with his vascular surgeon as an outpatient.  In the meantime he will attempt Keppra twice daily per neurologist suggestion, and see if this helps with the recurrent episodes of speech difficulty.  Patient is quite happy to go home and states understanding that if symptoms worsen or change he would need to return for reevaluation.      I have discussed management of the patient with the following physicians and OLEG's: Neurologist, Dr. Terrazas.    Escalation of care considered, and ultimately not performed:acute inpatient care management, however at this time, the patient is most appropriate for outpatient management    Barriers to care at this time, including but not limited to: None.     Decision tools and Rx drugs considered including, but not limited to : Keppra    Discussion of management with other [facility] or appropriate source(s): None    The patient will not drink alcohol nor drive with prescribed medications. The patient will return for worsening symptoms and is stable at the time of discharge. The patient verbalizes understanding and will comply.    FINAL IMPRESSION  1. TIA (transient ischemic attack)    2. Aphasia        Electronically signed by: Juan Luis Barajas M.D., 1/4/2024 12:50 PM

## 2024-01-04 NOTE — ED NOTES
Report from Belkys DEAN  Pt reports he was having a hard time talking. Pt is able to answer questions, no slurred speech or aphasia noted.

## 2024-01-04 NOTE — ED TRIAGE NOTES
Chief Complaint   Patient presents with    Slurred Speech     Pt reports episode of speech difficulty/expressive aphasia, beginning approx 30 min ago. States sx resolved about 5 min PTA however during triage pt reports episode occurring again. -facial droop, -unilateral weakness, -dizziness, -vision changes, -balance changes. Hx CVA 2012, multiple TIAs in the last year. Takes eliquis & baby ASA daily.      Pt WC to triage with wife for above.     Hx afib, & HTN as well.      in triage. Charge RN notified, stroke assessment initiated. Pt to charge desk.

## 2024-01-08 ENCOUNTER — OFFICE VISIT (OUTPATIENT)
Dept: NEUROLOGY | Facility: MEDICAL CENTER | Age: 79
End: 2024-01-08
Attending: STUDENT IN AN ORGANIZED HEALTH CARE EDUCATION/TRAINING PROGRAM
Payer: MEDICARE

## 2024-01-08 VITALS
DIASTOLIC BLOOD PRESSURE: 56 MMHG | TEMPERATURE: 96.7 F | HEIGHT: 73 IN | BODY MASS INDEX: 29.98 KG/M2 | OXYGEN SATURATION: 98 % | HEART RATE: 90 BPM | WEIGHT: 226.19 LBS | SYSTOLIC BLOOD PRESSURE: 118 MMHG

## 2024-01-08 DIAGNOSIS — I48.0 PAROXYSMAL ATRIAL FIBRILLATION (HCC): ICD-10-CM

## 2024-01-08 DIAGNOSIS — Z86.73 HISTORY OF STROKE: ICD-10-CM

## 2024-01-08 DIAGNOSIS — F41.9 CHRONIC ANXIETY: ICD-10-CM

## 2024-01-08 DIAGNOSIS — Z98.890 HISTORY OF CAROTID ENDARTERECTOMY: ICD-10-CM

## 2024-01-08 DIAGNOSIS — R47.89 WORD FINDING DIFFICULTY: ICD-10-CM

## 2024-01-08 DIAGNOSIS — C44.622 SCC (SQUAMOUS CELL CARCINOMA), ARM, RIGHT: ICD-10-CM

## 2024-01-08 DIAGNOSIS — R40.4 TRANSIENT ALTERATION OF AWARENESS: ICD-10-CM

## 2024-01-08 DIAGNOSIS — R29.818 TRANSIENT NEUROLOGICAL SYMPTOMS: ICD-10-CM

## 2024-01-08 DIAGNOSIS — I10 ESSENTIAL HYPERTENSION: ICD-10-CM

## 2024-01-08 DIAGNOSIS — I77.72 ILIAC DISSECTION (HCC): ICD-10-CM

## 2024-01-08 DIAGNOSIS — E78.2 MIXED HYPERLIPIDEMIA: ICD-10-CM

## 2024-01-08 DIAGNOSIS — I65.21 CAROTID STENOSIS, RIGHT: ICD-10-CM

## 2024-01-08 DIAGNOSIS — I25.5 ISCHEMIC CARDIOMYOPATHY: ICD-10-CM

## 2024-01-08 DIAGNOSIS — R26.81 GAIT INSTABILITY: ICD-10-CM

## 2024-01-08 DIAGNOSIS — R56.9 SEIZURES (HCC): ICD-10-CM

## 2024-01-08 DIAGNOSIS — E66.9 OBESITY (BMI 30-39.9): ICD-10-CM

## 2024-01-08 DIAGNOSIS — I25.10 CORONARY ARTERY DISEASE INVOLVING NATIVE HEART WITHOUT ANGINA PECTORIS, UNSPECIFIED VESSEL OR LESION TYPE: ICD-10-CM

## 2024-01-08 PROCEDURE — G2212 PROLONG OUTPT/OFFICE VIS: HCPCS | Performed by: STUDENT IN AN ORGANIZED HEALTH CARE EDUCATION/TRAINING PROGRAM

## 2024-01-08 PROCEDURE — 99212 OFFICE O/P EST SF 10 MIN: CPT | Performed by: STUDENT IN AN ORGANIZED HEALTH CARE EDUCATION/TRAINING PROGRAM

## 2024-01-08 PROCEDURE — 3074F SYST BP LT 130 MM HG: CPT | Performed by: STUDENT IN AN ORGANIZED HEALTH CARE EDUCATION/TRAINING PROGRAM

## 2024-01-08 PROCEDURE — 99215 OFFICE O/P EST HI 40 MIN: CPT | Performed by: STUDENT IN AN ORGANIZED HEALTH CARE EDUCATION/TRAINING PROGRAM

## 2024-01-08 PROCEDURE — 3078F DIAST BP <80 MM HG: CPT | Performed by: STUDENT IN AN ORGANIZED HEALTH CARE EDUCATION/TRAINING PROGRAM

## 2024-01-08 ASSESSMENT — FIBROSIS 4 INDEX: FIB4 SCORE: 1.527525231651946668

## 2024-01-08 ASSESSMENT — PATIENT HEALTH QUESTIONNAIRE - PHQ9: CLINICAL INTERPRETATION OF PHQ2 SCORE: 0

## 2024-01-08 NOTE — PROGRESS NOTES
"NEUROLOGY NEW PATIENT ENCOUNTER - 01/08/2024   REFERRING PROVIDER:  No referring provider defined for this encounter.      REASON FOR VISIT: Carl Blount 78 y.o. male presents today to establish care with me.    SUMMARY OF PROBLEMS AND/OR DIAGNOSES:      Patient recently presented to the ED on 1/4/2023. Per neurohospitalist on at the time:    \"Contacted by ED attending to discuss patient case. Patient is a 79 y/o M w/ history of stroke in 2012 (Lt-parietal) 2/2 prior left carotid stenosis and is s/p CEA and recurrent TIA/speech alteration events. Patient is on Eliquis 5 mg BID and aspirin for cardiovascular disease treatment. Patient presented today with recurrent speech changes, inability to get words out, slurred speech. Follow up acute head imaging without vessel occlusion, no perfusion changes, no significant stenosis. In the setting of patients dual antithrombotic therapy there would be no indication for either a third agent or change of his medication regimen. In the setting of recurrent stereotyped neurologic events in the setting of aggressive stroke risk factor management I raised the prospect that patient may in fact be having seizures as an alternate explanation. I recommended ED consider treating with 500 mg BID Keppra and arranging outpatient follow up with neurology to discuss duration and spell prevention. Patient's last ambulatory EEG in 2021 failed to demonstrate epileptiform activity. I do not think an MRI brain is indicated in this setting as it would not change his acute or chronic management but could be pursued if primary team deemed it necessary.  \"    Patient previously seen by Dr. Foote most recently Nov 2023 and Lora Batista before that. At that time he was having transient, recurrent word finding difficulty concerning for TIA/stroke vs seizure.    Previous Investigations:     Carotid US: 04/2023:Compared to the prior study on 1/20/22 - there is now moderate RIGHT  carotid " stenosis, previously mild. Moderate stenosis of the right internal carotid artery (50-69%).  Surgical changes of the carotid bifurcation consistent with post carotid endarterectomy status.  Mild stenosis of the left internal carotid artery (<50%).      MRI brain 08/2021 and 4/2022::  No acute abnormality. A small area of encephalomalacia in the left frontal lobe. Mild cerebral atrophy. Mild chronic microvascular ischemic disease.     24hr AEEG 11/2021: This is a  normal 23 hours ambulatory electroencephalogram recording in the awake, drowsy and sleep state.  No events were captured during the study.         This is a  normal 23 hours ambulatory electroencephalogram recording in the awake, drowsy and sleep state.  No events were captured during the study. Clinical correlation is recommended.       CTA neck 1/2024  This is a  normal 23 hours ambulatory electroencephalogram recording in the awake, drowsy and sleep state.  No events were captured during the study. Clinical correlation is recommended.     CTA head 1/2024:  No intracranial aneurysm, focal high-grade stenosis, or abrupt large vessel cut off.     CT perfusion of the head negative on 1/2024    -----------------------------------------------    Since discharge from the ED last week, he has not started keppra yet. It is still at his pharmacy. He is waiting. Keppra started at 500 mg BID. He has been doing well since discharge. Wife is present who assist in providing additional details regarding his history. He had a stroke involving the left MCA 2012. Years later he started having transient neurological symptoms of sudden onset word-finding difficulty, sometimes lasting several minutes. Denies any other symptoms. Occurs every couple of months or so although he may be having  smaller events lasting seconds multiple times per day/week. He reports that  he is hard on himself, gets anxious when he struggles to find words still but this events for which he has been  hospitalized are different as they are out of the blue, severe, and more prolonged. He has never been on any antiseizure medication before. Mood is generally good although is an anxious person at times who is hard on himself. Does routinely drink socially        Patient's PMH, PSH, FH, SH, allergies, and medications were reviewed:   has a past medical history of Afib (HCC), Atrial fibrillation (HCC), Hyperlipidemia, Hypertension, and Stroke (HCC).    has no past surgical history on file.   family history is not on file.    reports that he has quit smoking. He has never used smokeless tobacco. He reports current alcohol use. He reports that he does not use drugs.     Ros negative except that which was mentioned above    CURRENT MEDICATIONS AT THE TIME OF THIS ENCOUNTER:    Current Outpatient Medications:     Jardiance, 12.5 mg, Oral, DAILY, Taking    metoprolol SR, 25 mg, Oral, DAILY, Taking    lisinopril-hydrochlorothiazide, 1 Tablet, Oral, DAILY, Taking    levETIRAcetam, 500 mg, Oral, BID, Taking    spironolactone, 25 mg, Oral, DAILY, Taking    Mirabegron ER, 50 mg, Oral, DAILY, Taking    hydroxychloroquine, 200 mg, Oral, BID, Taking    diclofenac sodium, 4 g, Topical, 4X/DAY PRN, Taking    vitamin D3, 1,000 Units, Oral, DAILY, Taking    aspirin, 81 mg, Oral, DAILY, Taking    atorvastatin, 40 mg, Oral, Q EVENING, Taking    apixaban, 5 mg, Oral, BID, Taking    finasteride, 5 mg, Oral, QDAY, Taking    loratadine, 10 mg, Oral, DAILY, Taking    lidocaine, 1 Patch, Transdermal, QDAY PRN, Taking    traZODone, 100 mg, Oral, Nightly, Taking    therapeutic multivitamin-minerals, 1 Tablet, Oral, DAILY, Taking    cyanocobalamin, 1,000 mcg, Oral, DAILY, Taking    fish oil, 1,000 mg, Oral, DAILY, Taking    cyclobenzaprine, 10 mg, Oral, TID PRN, Taking    pantoprazole, 40 mg, Oral, DAILY, Taking    tamsulosin, 0.4 mg, Oral, BID, Taking     EXAM:   Ambulatory Vitals:  /56 (BP Location: Right arm, Patient Position: Sitting,  "BP Cuff Size: Adult)   Pulse 90   Temp 35.9 °C (96.7 °F) (Temporal)   Ht 1.854 m (6' 1\")   Wt 103 kg (226 lb 3.1 oz)   SpO2 98%    Physical Exam:  Physical Exam  Constitutional:       General: He is awake.      Appearance: Normal appearance.   HENT:      Head: Normocephalic.      Mouth/Throat:      Mouth: Mucous membranes are dry.      Pharynx: No oropharyngeal exudate.   Eyes:      Extraocular Movements: EOM normal. No nystagmus.   Cardiovascular:      Rate and Rhythm: Normal rate and regular rhythm.      Heart sounds: Murmur heard.   Pulmonary:      Effort: Pulmonary effort is normal.      Breath sounds: Normal breath sounds.   Skin:     General: Skin is dry.      Findings: Bruising and lesion present.   Neurological:      Mental Status: He is alert.        Neurological Exam   Neurological Exam  Mental Status  Awake and alert. Recalls 1 of 3 objects immediately. At 3 minutes recalls 3 of 3 objects. Fund of knowledge is appropriate for level of education.  Mild expressive aphasia noted. Word-finding difficulty. Decreased fluency. Only able to say 5 words that begin with letter F in 1 minute. No word substituions. Language percewption intact. Able to follow complex multistep commands.    Cranial Nerves  CN II: Right normal visual field. Left normal visual field.  CN III, IV, VI: Extraocular movements intact bilaterally. No nystagmus. Normal saccades. Diminished smooth pursuit.   Right pupil: 3 mm. Round. Abnormal reactivity:   Left pupil: 3 mm. Round. Abnormal reactivity:  CN V: Facial sensation is normal.  CN VII: Full and symmetric facial movement.  CN XII: Tongue midline without atrophy or fasciculations.    Motor   Right pronator drift.  Mild slowness to finger tapping on right. Clumsiness, ataxic with left fingers. Mild diffuse weakness of LE.    Sensory  Light touch is normal in upper and lower extremities.  No right-sided hemispatial neglect. No left-sided hemispatial neglect. Right extinction absent: " Left extinction absent:    Coordination  Right: Finger-to-nose normal. Rapid alternating movement abnormality:Left: Finger-to-nose abnormality: Rapid alternating movement abnormality:    Gait  Casual gait: Narrow stance. Reduced stride length. Hesitant gait.       RELEVANT DATA PERSONALLY REVIEWED:       ASSESSMENT, EDUCATION, COUNSELING:  This is a 78 y.o. male patient who presents to the neurology clinic. We had an extensive discussion about the patient's symptoms, signs, and work-up to date, if any. We discussed potential and/or definitive diagnoses, work-up, and potential treatments.       PLAN:  If applicable, the work-up such as labs, imaging, procedures, and/or other testing, referrals, and/or recommended treatment strategies are listed below.  Visit Diagnoses     ICD-10-CM   1. Seizures (HCC)  R56.9   2. Carotid stenosis, right  I65.21   3. Ischemic cardiomyopathy  I25.5   4. History of stroke  Z86.73   5. Gait instability  R26.81   6. Word finding difficulty  R47.89   7. Transient neurological symptoms  R29.818   8. Chronic anxiety  F41.9   9. Coronary artery disease involving native heart without angina pectoris, unspecified vessel or lesion type  I25.10   10. Paroxysmal atrial fibrillation (HCC)  I48.0   11. SCC (squamous cell carcinoma), arm, right  C44.622   12. History of carotid endarterectomy  Z98.890   13. Obesity (BMI 30-39.9)  E66.9   14. Essential hypertension  I10   15. 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 Mixed hyperlipidemia  E78.2   17. Transient alteration of awareness  R40.4      Orders Placed This Encounter    Referral to Neurodiagnostics (EEG,EP,EMG/NCS/DBS)        Patient with episodes of severe word-finding difficulty, expressive aphasia seemingly out of the blue lasting seconds at times while at other times lasting more prolonged. I agree with neurohospitalist that these are less likely to be TIAs/strokes. I have a greater suspicion that these are focal seizures that arise out  the area where his prior known stroke is. Recommend the following    Keep a dairy of sym[ptoms  Start keppra 500 mg  twice per day once he is able to pick it up from the pharmacy. Side effects, including mood-related side effects, were discussed.   Schedule 72 hour ambulatory EEG  Follow-up with Dr. Martin in 3 months        BILLING DOCUMENTATION:     I spent a total of  I spent a total of 80 minutes on the day of the visit.   minutes of face-to-face time in this visit. Over 50% of the time of the visit today was spent on counseling and/or coordination of care wtih the patient and/or family, as above in assessment in plan.    Charles Martin MD  Epilepsy and General Neurology  Department of Neurology  Clinical  of Neurology [employer].

## 2024-01-09 NOTE — PATIENT INSTRUCTIONS
NEUROLOGY CLINIC VISIT WITH DR. MARTIN     PLEASE READ THIS ENTIRE DOCUMENT CAREFULLY AND COMPLETELY:    First and foremost, you matter to Dr. Martin and you deserve the best care.   Dr. Martin prides himself on providing the best possible care to all his patients. He strives to make each appointment meaningful, so that all your concerns are being addressed and all your neurological problems are being optimally treated. In order to achieve these goals for everyone, Dr. Martin has listed important reminders and the best ways to prepare for each appointment. Please read each item carefully. Thank you!    Due to the high volume of patients we are trying to help, your physician will not be able to respond by phone or in [facility] to your routine concerns between appointments.  This does not reflect a lack of interest or concern for you or your diagnosis.  Please bring these questions and concerns to your appointment where your physician can answer.  Please relay more pressing concerns to our office, either via [facility], or by phone; if not able to reach us please visit nearby Urgent Care Center or Emergency Department.  If any emergent medical needs, please seek emergent medical help and/or call 911.    Also, please note that we are not able to fill out paperwork that might be related to your work, utility company, disability, and/or driving, among others, in between the visits.  Please schedule a dedicated appointment to address any and all paperwork.  This is not due to lack of concern or interest for your disease-related work/administrative problems, but to make sure that we provide the best possible care and to fill out your paperwork in a correct, complete, and timely manner.  ------------------------------------------------------------------------------------------  Please let our office know if you have any changes in your seizure frequency and/characteristics.     Please keep a diary of your seizures and bring it  with you to each appointment.    Please take vitamin D3 [id number] internation units daily.     Please abstain from driving until further notice    If you are a biological female with epilepsy who is of reproductive age, who is actively breastfeeding, and/or who infants/young children:  Please take folic acid 1 mg daily. This is an over-the-counter supplement that is recommended to prevent certain developmental problems in your baby, in case you become pregnant in the future.  It is critical that you let our office know as soon as you become pregnant or plan to become pregnant.  If you are caring for a baby/young child, please make sure to be sitting on a soft surface while holding your baby/young child, so in case you have a seizure, your baby/young child is not injured due to fall.   Please let us know if, while breastfeeding, you observe that your baby is excessively sleepy and/or has other behavioral changes. Because many antiseizure medications are collected in breast milk, some nursing babies can suffer adverse medication effects.    Please note that the following might precipitate seizures:   missed doses of antiseizure medications  being sick with a fever, stress  Fatigue  sleep deprivation or abnormal sleeping patterns  not eating regularly  not drinking enough water  drinking too much alcohol  stopping alcohol suddenly if you are currently using it on a regular/daily basis,   using recreational drugs, among others.    Please note that the following might lead to an injury or even be life-threatening in the event you have a seizure and/or lose awareness while:  being in a large body of water by yourself, such as bath, pool, lake, ocean, among others (risk of drowning)  being on unprotected heights (risk of fall)  being around and/or operating heavy machinery (risk of injury)  being around open fire/hot surfaces (risk of burns)  any other activities/circumstances, in which if you lose awareness, you might  injure yourself and/or others.  -------------------------------------------------------------------------------------------  SUDEP (SUDDEN UNEXPECTED DEATH IN EPILEPSY)  It is important that your seizures are well controlled and you have none or have them rarely. In addition to avoiding injury related to breakthrough seizures, frequent seizures increase risk of SUDEP (sudden unexpected death in epilepsy), where a person goes into a seizure and then never wakes up. The best way to prevent SUDEP is to control your seizures well.   ------------------------------------------------------------------------------------------  Please call for help (crisis line and/or 911) in case you have thoughts of harming yourself and/or others.  ------------------------------------------------------------------------------------------  INSTRUCTIONS FOR YOUR FAMILY/CAREGIVERS:  Please call 911 if the patient has a seizure longer than 2-3 minutes, if seizures are back to back without her recovering to her baseline, or she does not start recovering within 5-10 minutes after the seizure stops. During the seizure - please turn her on her side, please make sure her head is protected (for example, you should put a pillow under her head, if one is available), and please do not put anything in her mouth.   ------------------------------------------------------------------------------------------  PATIENT EXPECTATIONS,  IMPORTANT APPOINTMENT REMINDERS, AND ADDITIONAL HELPFUL TIPS:   REFILLS:   Request refills AT LEAST 1 week in advance to ensure you do not run out of medications    MyChart  It is STRONGLY encouraged that ALL patients sign up for MyChart. It is BY FAR the fastest and most convenient way for both Dr. Martin and patients to obtain timely refills.  If you are having trouble signing up or logging into your account, staff are available to help you. Please ask a medical assistant or staff at the  to assist you.    TEST RESULS:    All labs and diagnostic test results will be reviewed at your next visit, UNLESS  Dr. Martin determines that there are important findings on the tests need to be acted on sooner. Dr. Martin will either call or send a message through [facility] if this is the case.    BE PREPARED PRIOR TO EVERY APPOINTMENT:  All patient are responsible for ensuring that ALL test results that were completed outside of the [facility] system have been received by our Neurology Department PRIOR to your appointment with Dr. Martin.    IMPORTANT:  ALL images (not just the reports) must be sent and uploaded to the [facility] system. Dr. Martin reviews all images personally prior to each visit. Ensuring that ALL the test results and test images are accessible to Dr. Martin prior to your appointment is YOUR responsibility and an important part of making the most out of each appointment.   Bring a government-issues picture ID and an updated insurance card EVERY visit.  It is highly recommended that you bring at every visit a list of the most important topics that you want address. While it may not be possible to address all items on the list in a single visit, preparing a list will ensure that Dr. Martin addresses the items that are most important to you and your health    PAPERWORK, DOCUMENTATION, LETTER REQUESTS:  You must notify the office ahead of your appointment of all paperwork or letter requests.   Please DO NOT wait until the last minute to make these requests. Please give all paperwork to the medical assistant at the start of the appointment and check-in process. Please note that Dr. Martin may not be able complete some types of documentation in a single appointment or even within a single day or week. This is why it is important to communicate paperwork requests prior to your appointment and at least 2 weeks prior to any deadlines.    KNOW ALL YOU MEDICATIONS:   AT EVERY SINGLE APPOINTMENT, please bring a list of every single prescribed,  non-prescribed, and over the counter medication or supplements you are taking, including ones taken on a rare or intermittent basis.  Include the following information for each prescribed or non-prescribed medications:  Name of medication   The strength of EACH pill/capsule/tablet, etc.   The number of pills/capsules/tablets, etc taken per dose  The number and time of day that doses are taken  For every single Supplement that you take on a routine or intermittent basis, you must include:  The Brand Name   A complete list of every single ingredient, compound, vitamin, and/or mineral in each dose, along with the corresponding amounts/strengths of all ingredients, vitamins, minerals, etc., if such information is provided or known  The number of doses taken per day and time of day doses are taken  If medications are taken on an intermittent or as needed basis, please estimate how many days per week or days per month the medications are used  DO NOT just print out your medication list from [facility] or bring a list from a prior appointment or hospitalizations because the information is often often unreliable, inaccurate, outdated, and/or incomplete   The list should be printed or written  If you forget or do not have a list of all the medication, then it is acceptable, although less preferred, to bring all the bottles to the appointment     ARRIVE EARLY FOR ALL VISITS:  Please note that we are unable to accommodate late arrivals as per office policy.  YOU-the patient - (NOT a parent, spouse, or friend) must be physically present at check-in no later than 12 minutes after the scheduled appointment time, or you will be asked to reschedule   Consider scheduling a virtual appointment with Dr. Martin through [facility] as an alternative if transportation to the clinic is difficult or unavailable   Please note, however, that virtual visits can only be scheduled after being an established patient of Dr. Martin. All new appointments  "must be done in-person in clinic  Some insurances will not cover the cost of virtual appointments. Please check with your insurance to find out if these visits are covered    COMMUNICATING URGENT AND NON-URGENT MATTERS:  Your concerns are important and deserve to be heard and addressed. If you have an urgent matter, there are two methods that will ensure your concerns are prioritized appropriately:   Preferred method: Sign-up/Login to your [employer] account and send a message addressed to Dr. Martin or Angela Johnson (Dr. Martin's assistant). In the subject line, type \"urgent\" followed by a word or phrase describing the situation (For example, write \"Urgent: Out of antiseuzre med and need refill\" or \"Urgent: Severe side effects to new meds\". In doing this, our staff can ensure urgent messages are triaged appropriately and communicated to Dr. Martin that day.  Call [facility] main line at [phone]. Dr. Martin's voicemail extension is [phone]. When leaving a voice message, specifically indicate if it is urgent (or non-urgent) so that the matter can be triaged appropriately and addressed in a timely manner    Thank you for entrusting your neurological care to [facility] and we look forward to continuing to serve you.   "

## 2024-01-24 ENCOUNTER — APPOINTMENT (OUTPATIENT)
Dept: CARDIOLOGY | Facility: MEDICAL CENTER | Age: 79
End: 2024-01-24
Attending: INTERNAL MEDICINE
Payer: MEDICARE

## 2024-02-05 ENCOUNTER — NON-PROVIDER VISIT (OUTPATIENT)
Dept: NEUROLOGY | Facility: MEDICAL CENTER | Age: 79
End: 2024-02-05
Attending: STUDENT IN AN ORGANIZED HEALTH CARE EDUCATION/TRAINING PROGRAM
Payer: MEDICARE

## 2024-02-05 DIAGNOSIS — R47.89 WORD FINDING DIFFICULTY: ICD-10-CM

## 2024-02-05 DIAGNOSIS — R40.4 TRANSIENT ALTERATION OF AWARENESS: ICD-10-CM

## 2024-02-05 DIAGNOSIS — R56.9 SEIZURES (HCC): ICD-10-CM

## 2024-02-05 DIAGNOSIS — R29.818 TRANSIENT NEUROLOGICAL SYMPTOMS: ICD-10-CM

## 2024-02-05 PROCEDURE — 95700 EEG CONT REC W/VID EEG TECH: CPT | Performed by: STUDENT IN AN ORGANIZED HEALTH CARE EDUCATION/TRAINING PROGRAM

## 2024-02-05 PROCEDURE — 95708 EEG WO VID EA 12-26HR UNMNTR: CPT | Performed by: STUDENT IN AN ORGANIZED HEALTH CARE EDUCATION/TRAINING PROGRAM

## 2024-02-05 PROCEDURE — 95719 EEG PHYS/QHP EA INCR W/O VID: CPT | Performed by: STUDENT IN AN ORGANIZED HEALTH CARE EDUCATION/TRAINING PROGRAM

## 2024-02-05 NOTE — PROCEDURES
(No note.)   state(s):  -Occasional left posterior temporal-superior parietal slowing indicative of focal dysfunction and consistent with known area of gliosis from stroke.  -No definitive epileptiform discharges were seen.  -No seizures.    -Clinical Events: None  -Note: Sampling of the EKG recording showed an irregularly irregular rhythm with intermittent PACs and/or PVCs. Clinical correlation recommended       EEG Disclaimer : This EEG does not rule out the possibility of seizures or exclude a diagnosis of epilepsy.  If the clinical suspicion remains high for seizures, a prolonged recording to capture clinical or subclinical events may be helpful.          Charles Martin MD  Department of Neurology at [facility]  General Neurologist and Epileptologist  Director of [facility]'s Level III Comprehensive Epilepsy Program  Professor of Clinical Neurology, [facility].   Phone: [phone]  Fax: [phone]  E-mail: [email or web address]

## 2024-02-06 ENCOUNTER — NON-PROVIDER VISIT (OUTPATIENT)
Dept: NEUROLOGY | Facility: MEDICAL CENTER | Age: 79
End: 2024-02-06
Attending: STUDENT IN AN ORGANIZED HEALTH CARE EDUCATION/TRAINING PROGRAM
Payer: MEDICARE

## 2024-02-06 DIAGNOSIS — R56.9 SEIZURES (HCC): ICD-10-CM

## 2024-02-06 DIAGNOSIS — R40.4 TRANSIENT ALTERATION OF AWARENESS: ICD-10-CM

## 2024-02-06 DIAGNOSIS — R47.89 WORD FINDING DIFFICULTY: ICD-10-CM

## 2024-02-06 DIAGNOSIS — R29.818 TRANSIENT NEUROLOGICAL SYMPTOMS: ICD-10-CM

## 2024-02-06 PROCEDURE — 95719 EEG PHYS/QHP EA INCR W/O VID: CPT | Performed by: STUDENT IN AN ORGANIZED HEALTH CARE EDUCATION/TRAINING PROGRAM

## 2024-02-06 PROCEDURE — 95708 EEG WO VID EA 12-26HR UNMNTR: CPT | Performed by: STUDENT IN AN ORGANIZED HEALTH CARE EDUCATION/TRAINING PROGRAM

## 2024-02-06 NOTE — PROCEDURES
AMBULATORY ELECTROENCEPHALOGRAM REPORT      REFERRING PROVIDER:    Charles Martin M.D.  [address]  [place] [address]  DOS: 02/06/2024   DURATION OF RECORDING: (23 hours and 24 minutes)    INDICATION:  Carl Blount 79 y.o. male presenting with  altered mental status and paroxysmal neurological events    CURRENT OUTPATIENT MEDICATION LIST:    levETIRAcetam (KEPPRA) 500 mg, Oral, 2 TIMES DAILY     The EEG was set up and taken down by a Neurodiagnostic technologist who performed education to patient and staff.  A minimum but not limited to 23 electrodes and 23 channel recording was setup and performed by Neurodiagnostic technologist. Impedances, electrode integrity, and technical impressions were documented a minimum of every 2-24 hour period by a Neurodiagnostic Technologist and reviewed by Interpreting Physician.    DESCRIPTION OF THE RECORD:  During maximal wakefulness, the background was continuous and showed a 9 Hz posterior dominant rhythm.  Reactivity and state changes were present.  During drowsiness, theta/delta frequencies were seen.    Sleep was captured and was characterized by diffuse background delta/theta activity with a loss of myogenic artifact.  N2 sleep transients in the form of sleep spindles and vertex waves were seen in the leads over the central regions.     ICTAL AND INTERICTAL FINDINGS:   No focal or generalized epileptiform activity noted.     Occasional left posterior temporal-superior parietal theta/delta polymorphic slowing.    No seizures.    ACTIVATION PROCEDURES:   Intermittent Photic stimulation was performed in a stepwise fashion from 1 to 30 Hz and did not elicited any abnormalities on EEG.     EKG: Sampling of the EKG recording showed an irregularly irregular rhythm with intermittent PACs and/or PVCs.    EVENTS:      No clinical events were captured or reported.      INTERPRETATION:  Abnormal ambulatory EEG recording in the awake and drowsy/sleep  state(s):  -Occasional left posterior temporal-superior parietal slowing indicative of focal dysfunction and consistent with known area of gliosis from stroke.  -No definitive epileptiform discharges were seen.  -No seizures.  and No definitive seizures.   -Clinical Events: None  EEG Disclaimer : Note: This EEG does not rule out the possibility of seizures or exclude a diagnosis of epilepsy.  If the clinical suspicion remains high for seizures, a prolonged recording to capture clinical or subclinical events may be helpful.  -Note: Sampling of the EKG recording showed an irregularly irregular rhythm with intermittent PACs and/or PVCs. Clinical correlation recommended        Charles Martin MD  Department of Neurology at [facility]  General Neurologist and Epileptologist  Director of [facility]'s Level III Comprehensive Epilepsy Program  Professor of Clinical Neurology, [facility].   Phone: [phone]  Fax: [phone]  E-mail: [email or web address]

## 2024-02-07 ENCOUNTER — NON-PROVIDER VISIT (OUTPATIENT)
Dept: NEUROLOGY | Facility: MEDICAL CENTER | Age: 79
End: 2024-02-07
Attending: STUDENT IN AN ORGANIZED HEALTH CARE EDUCATION/TRAINING PROGRAM
Payer: MEDICARE

## 2024-02-07 DIAGNOSIS — R47.89 WORD FINDING DIFFICULTY: ICD-10-CM

## 2024-02-07 DIAGNOSIS — R40.4 TRANSIENT ALTERATION OF AWARENESS: ICD-10-CM

## 2024-02-07 DIAGNOSIS — R29.818 TRANSIENT NEUROLOGICAL SYMPTOMS: ICD-10-CM

## 2024-02-07 DIAGNOSIS — R56.9 SEIZURES (HCC): ICD-10-CM

## 2024-02-07 PROCEDURE — 95719 EEG PHYS/QHP EA INCR W/O VID: CPT | Performed by: STUDENT IN AN ORGANIZED HEALTH CARE EDUCATION/TRAINING PROGRAM

## 2024-02-07 PROCEDURE — 95708 EEG WO VID EA 12-26HR UNMNTR: CPT | Performed by: STUDENT IN AN ORGANIZED HEALTH CARE EDUCATION/TRAINING PROGRAM

## 2024-02-08 ENCOUNTER — NON-PROVIDER VISIT (OUTPATIENT)
Dept: NEUROLOGY | Facility: MEDICAL CENTER | Age: 79
End: 2024-02-08
Attending: STUDENT IN AN ORGANIZED HEALTH CARE EDUCATION/TRAINING PROGRAM
Payer: MEDICARE

## 2024-02-19 NOTE — PROCEDURES
AMBULATORY ELECTROENCEPHALOGRAM REPORT      REFERRING PROVIDER:    Charles Martin M.D.  [address]  [place] [address]  DOS: 02/07/2024   DURATION OF RECORDING: (23 hours and 37 minutes)    INDICATION:  Carl Blount 79 y.o. male presenting with  altered mental status and paroxysmal neurological events    CURRENT OUTPATIENT MEDICATION LIST:   Keppra 500 mg BID    TECHNIQUE:   The EEG was set up and taken down by a Neurodiagnostic technologist who performed education to patient and staff.  A minimum but not limited to 23 electrodes and 23 channel recording was setup and performed by Neurodiagnostic technologist. Impedances, electrode integrity, and technical impressions were documented a minimum of every 2-24 hour period by a Neurodiagnostic Technologist and reviewed by Interpreting Physician.    DESCRIPTION OF THE RECORD:  During maximal wakefulness, the background was continuous and showed a 9 Hz posterior dominant rhythm.  Reactivity and state changes were present.  During drowsiness, theta/delta frequencies were seen.    Sleep was captured and was characterized by diffuse background delta/theta activity with a loss of myogenic artifact.  N2 sleep transients in the form of sleep spindles and vertex waves were seen in the leads over the central regions.     ICTAL AND INTERICTAL FINDINGS:   No focal or generalized epileptiform activity noted.     Occasional left posterior temporal-superior parietal theta/delta polymorphic slowing.    No seizures.    ACTIVATION PROCEDURES:   NA    EKG: Sampling of the EKG recording showed an irregularly irregular rhythm with intermittent PACs and/or PVCs.    EVENTS:      No clinical events were captured or reported.      INTERPRETATION:  Abnormal ambulatory EEG recording in the awake and drowsy/sleep state(s):  -Occasional left posterior temporal-superior parietal slowing indicative of focal dysfunction and consistent with known area of gliosis from stroke.  -No  definitive epileptiform discharges were seen.  -No seizures.  and No definitive seizures.   -Clinical Events: None  EEG Disclaimer : Note: This EEG does not rule out the possibility of seizures or exclude a diagnosis of epilepsy.  If the clinical suspicion remains high for seizures, a prolonged recording to capture clinical or subclinical events may be helpful.  -Note: Sampling of the EKG recording showed an irregularly irregular rhythm with intermittent PACs and/or PVCs. Clinical correlation recommended  -Compared to the prior study, this one is similar        Charles Martin MD  Department of Neurology at [facility]  General Neurologist and Epileptologist  Director of [facility] Level III Comprehensive Epilepsy Program  Professor of Clinical Neurology, [facility].   Phone: [phone]  Fax: [phone]  E-mail: [email or web address]

## 2024-02-19 NOTE — PROCEDURES
AMBULATORY ELECTROENCEPHALOGRAM REPORT      REFERRING PROVIDER:    Charles Martin M.D.  [address]  [place] [address]  DOS: 02/08/2024   DURATION OF RECORDING: (22 hours and 8 minutes)    INDICATION:  Carl Blount 79 y.o. male presenting with  altered mental status and paroxysmal neurological events    CURRENT OUTPATIENT MEDICATION LIST:   Keppra 500 mg BID    TECHNIQUE:   The EEG was set up and taken down by a Neurodiagnostic technologist who performed education to patient and staff.  A minimum but not limited to 23 electrodes and 23 channel recording was setup and performed by Neurodiagnostic technologist. Impedances, electrode integrity, and technical impressions were documented a minimum of every 2-24 hour period by a Neurodiagnostic Technologist and reviewed by Interpreting Physician.    DESCRIPTION OF THE RECORD:  During maximal wakefulness, the background was continuous and showed a 9 Hz posterior dominant rhythm.  Reactivity and state changes were present.  During drowsiness, theta/delta frequencies were seen.    Sleep was captured and was characterized by diffuse background delta/theta activity with a loss of myogenic artifact.  N2 sleep transients in the form of sleep spindles and vertex waves were seen in the leads over the central regions.     ICTAL AND INTERICTAL FINDINGS:   No focal or generalized epileptiform activity noted.     Occasional left posterior temporal-superior parietal theta/delta polymorphic slowing.    No seizures.    ACTIVATION PROCEDURES:   NA    EKG: Sampling of the EKG recording showed an irregularly irregular rhythm with intermittent PACs and/or PVCs.    EVENTS:      No clinical events were captured or reported.      INTERPRETATION:  Abnormal ambulatory EEG recording in the awake and drowsy/sleep state(s):  -Occasional left posterior temporal-superior parietal slowing indicative of focal dysfunction and consistent with known area of gliosis from stroke.  -No  definitive epileptiform discharges were seen.  -No seizures.  and No definitive seizures.   -Clinical Events: None  -Note: Sampling of the EKG recording showed an irregularly irregular rhythm with intermittent PACs and/or PVCs. Clinical correlation recommended  -Compared to the prior two ambulatory studies, no major differences were seen    EEG Disclaimer : This EEG does not rule out the possibility of seizures or exclude a diagnosis of epilepsy.  If the clinical suspicion remains high for seizures, a prolonged recording to capture clinical or subclinical events may be helpful.            Charles Martin MD  Department of Neurology at [facility]  General Neurologist and Epileptologist  Director of [facility] Level III Comprehensive Epilepsy Program  Professor of Clinical Neurology, [facility].   Phone: [phone]  Fax: [phone]  E-mail: [email or web address]

## 2024-02-22 ENCOUNTER — OFFICE VISIT (OUTPATIENT)
Dept: CARDIOLOGY | Facility: MEDICAL CENTER | Age: 79
End: 2024-02-22
Attending: INTERNAL MEDICINE
Payer: MEDICARE

## 2024-02-22 VITALS
BODY MASS INDEX: 31.28 KG/M2 | RESPIRATION RATE: 16 BRPM | HEART RATE: 60 BPM | SYSTOLIC BLOOD PRESSURE: 126 MMHG | WEIGHT: 236 LBS | HEIGHT: 73 IN | DIASTOLIC BLOOD PRESSURE: 60 MMHG | OXYGEN SATURATION: 98 %

## 2024-02-22 DIAGNOSIS — I50.22 HEART FAILURE WITH MILDLY REDUCED EJECTION FRACTION (HCC): ICD-10-CM

## 2024-02-22 DIAGNOSIS — Z98.890 HISTORY OF CAROTID ENDARTERECTOMY: ICD-10-CM

## 2024-02-22 DIAGNOSIS — I25.10 CORONARY ARTERY DISEASE INVOLVING NATIVE HEART WITHOUT ANGINA PECTORIS, UNSPECIFIED VESSEL OR LESION TYPE: ICD-10-CM

## 2024-02-22 DIAGNOSIS — I48.0 PAROXYSMAL ATRIAL FIBRILLATION (HCC): ICD-10-CM

## 2024-02-22 DIAGNOSIS — I10 ESSENTIAL HYPERTENSION: ICD-10-CM

## 2024-02-22 PROCEDURE — 99214 OFFICE O/P EST MOD 30 MIN: CPT | Performed by: INTERNAL MEDICINE

## 2024-02-22 PROCEDURE — 93005 ELECTROCARDIOGRAM TRACING: CPT | Performed by: INTERNAL MEDICINE

## 2024-02-22 PROCEDURE — 99213 OFFICE O/P EST LOW 20 MIN: CPT | Performed by: INTERNAL MEDICINE

## 2024-02-22 PROCEDURE — 3078F DIAST BP <80 MM HG: CPT | Performed by: INTERNAL MEDICINE

## 2024-02-22 PROCEDURE — 3074F SYST BP LT 130 MM HG: CPT | Performed by: INTERNAL MEDICINE

## 2024-02-22 ASSESSMENT — MINNESOTA LIVING WITH HEART FAILURE QUESTIONNAIRE (MLHF)
MAKING YOU SHORT OF BREATH: 0
WORKING AROUND THE HOUSE OR YARD DIFFICULT: 0
DIFFICULTY WITH SEXUAL ACTIVITIES: 0
GIVING YOU SIDE EFFECTS FROM TREATMENTS: 0
MAKING YOU WORRY: 0
EATING LESS FOODS YOU LIKE: 0
DIFFICULTY TO CONCENTRATE OR REMEMBERING THINGS: 0
DIFFICULTY WITH RECREATIONAL PASTIMES, SPORTS, HOBBIES: 0
TOTAL_SCORE: 2
MAKING YOU FEEL DEPRESSED: 0
DIFFICULTY SOCIALIZING WITH FAMILY OR FRIENDS: 0
DIFFICULTY GOING AWAY FROM HOME: 0
LOSS OF SELF CONTROL IN YOUR LIFE: 0
DIFFICULTY WORKING TO EARN A LIVING: 0
TIRED, FATIGUED OR LOW ON ENERGY: 0
MAKING YOU STAY IN A HOSPITAL: 0
HAVING TO SIT OR LIE DOWN DURING THE DAY: 1
WALKING ABOUT OR CLIMBING STAIRS DIFFICULT: 1
COSTING YOU MONEY FOR MEDICAL CARE: 0
FEELING LIKE A BURDEN TO FAMILY AND FRIENDS: 0
DIFFICULTY SLEEPING WELL AT NIGHT: 0
SWELLING IN ANKLES OR LEGS: 0

## 2024-02-22 ASSESSMENT — FIBROSIS 4 INDEX: FIB4 SCORE: 1.55

## 2024-02-22 NOTE — PROGRESS NOTES
[facility]    PatientName:Carl HighelsDate: 2024  :1945    79 y.o.PCP:Zuleyma Cuevas M.D.  MRN:[id number]        Problems and Plans    Essential hypertension  Blood pressures currently well-controlled.  No changes are made at this time    Coronary artery disease involving native heart without angina pectoris  Regarding his noted coronary artery disease he remains on appropriate medical therapy no changes are made to his medical therapy    History of carotid endarterectomy  Patient is scheduled to have a carotid ultrasound and will make further recommendations pending overall evaluation.  He does remain on a robust regimen for atherosclerosis of the vascular beds.    Paroxysmal atrial fibrillation (HCC)  Regarding his paroxysmal atrial fibrillation.  He is not voicing any concerns of palpitations.  He is EKG demonstrates a sinus rhythm with a noted left bundle branch block.  He will continue with ongoing Eliquis therapy at this time.  No changes were made    Heart failure with mildly reduced ejection fraction (HCC)  Regarding his heart failure with reduced ejection fraction he did see improvement back on his most recent echocardiogram with an ejection fraction of roughly 35%.  At this time he is borderline candidate for primary prevention for an arrhythmic death with subsequent AICD and consideration for possible biventricular pacemaker.  Will make further recommendations following his next echocardiogram.  It would not be unreasonable to consider possible resynchronization therapy.    Return in about 5 weeks (around 3/28/2024).      Encounter    Reason for Visit / Chief Complaint: Heart failure with mildly reduced ejection fraction    HPI    79-year-old male with known history of heart failure with reduced ejection fraction, coronary artery disease with prior drug-eluting stent placed into the right coronary artery consisting of a Xience 2.25 x 12 mm  postdilated to 2.5 mm, hypertension, dyslipidemia, osteoarthritis, prior TIA, carotid artery disease presents in clinic for ongoing management of his heart failure with reduced ejection fraction     Currently, he notes he is feeling well and not having adverse cardiovascular complaints.  He was recently seen in the emergency department at [facility] briefly after his last office visit and concern for seizure-like activities.  He is working closely with his neurologist regarding an EEG.  He is accompanied by his wife who reiterates similar history..    Most recent echocardiogram was performed on 11/2/2023 which demonstrated a globally hypokinetic LV systolic function with estimate ejection fraction of 35% with grade 1 diastolic dysfunction and normal left atrial filling pressure aortic valve sclerosis without stenosis mild mitral valve regurgitation      Past Medical History  Past Medical History:   Diagnosis Date    Afib (HCC)     Atrial fibrillation (HCC)     Hyperlipidemia     Hypertension     Stroke (HCC)      Past Surgical History  No past surgical history on file.  Social History  Social History     Socioeconomic History    Marital status:      Spouse name: Not on file    Number of children: Not on file    Years of education: Not on file    Highest education level: Not on file   Occupational History    Not on file   Tobacco Use    Smoking status: Former    Smokeless tobacco: Never   Vaping Use    Vaping Use: Never used   Substance and Sexual Activity    Alcohol use: Yes     Comment: 2 drinks/night    Drug use: Never    Sexual activity: Not Currently   Other Topics Concern    Not on file   Social History Narrative    Not on file     Social Determinants of Health     Financial Resource Strain: Low Risk  (4/29/2022)    Overall Financial Resource Strain (CARDIA)     Difficulty of Paying Living Expenses: Not hard at all   Food Insecurity: No Food Insecurity (4/29/2022)    Hunger Vital Sign      "Worried About Running Out of Food in the Last Year: Never true     Ran Out of Food in the Last Year: Never true   Transportation Needs: No Transportation Needs (4/29/2022)    PRAPARE - Transportation     Lack of Transportation (Medical): No     Lack of Transportation (Non-Medical): No   Physical Activity: Not on file   Stress: Not on file   Social Connections: Not on file   Intimate Partner Violence: Not on file   Housing Stability: Not on file     Past Family History  No family history on file.  Medication(s)    Current Outpatient Medications:     Jardiance, 12.5 mg, Oral, DAILY, Taking    metoprolol SR, 25 mg, Oral, DAILY, Taking    levETIRAcetam, 500 mg, Oral, BID, Taking    spironolactone, 25 mg, Oral, DAILY, Taking    Mirabegron ER, 50 mg, Oral, DAILY, Taking    hydroxychloroquine, 200 mg, Oral, BID, Taking    diclofenac sodium, 4 g, Topical, 4X/DAY PRN, PRN    vitamin D3, 1,000 Units, Oral, DAILY, Taking    aspirin, 81 mg, Oral, DAILY, Taking    atorvastatin, 40 mg, Oral, Q EVENING, Taking    apixaban, 5 mg, Oral, BID, Taking    finasteride, 5 mg, Oral, QDAY, Taking    loratadine, 10 mg, Oral, DAILY, Taking    lidocaine, 1 Patch, Transdermal, QDAY PRN, PRN    traZODone, 100 mg, Oral, Nightly, Taking    therapeutic multivitamin-minerals, 1 Tablet, Oral, DAILY, Taking    cyanocobalamin, 1,000 mcg, Oral, DAILY, Taking    fish oil, 1,000 mg, Oral, DAILY, Taking    cyclobenzaprine, 10 mg, Oral, TID PRN, PRN    pantoprazole, 40 mg, Oral, DAILY, Taking    tamsulosin, 0.4 mg, Oral, BID, Taking  Allergies  Patient has no known allergies.    Review of Systems    A comprehensive 10 system review was conducted and is negative except as noted above in the HPI or here.      Vital Signs  /60 (BP Location: Left arm, Patient Position: Sitting, BP Cuff Size: Adult)   Pulse 60   Resp 16   Ht 1.854 m (6' 1\")   Wt 107 kg (236 lb)   SpO2 98%   BMI 31.14 kg/m²     Physical Exam  Constitutional:       Appearance: Normal " "appearance. He is obese.   HENT:      Head: Normocephalic and atraumatic.      Mouth/Throat:      Mouth: Mucous membranes are moist.      Pharynx: Oropharynx is clear.   Eyes:      Extraocular Movements: Extraocular movements intact.      Conjunctiva/sclera: Conjunctivae normal.   Cardiovascular:      Rate and Rhythm: Normal rate and regular rhythm.      Pulses: Normal pulses.      Heart sounds: Normal heart sounds. No murmur heard.     No friction rub. No gallop.   Pulmonary:      Effort: Pulmonary effort is normal.      Breath sounds: Normal breath sounds.   Abdominal:      General: Bowel sounds are normal.      Palpations: Abdomen is soft.   Musculoskeletal:         General: Normal range of motion.      Cervical back: Normal range of motion and neck supple.   Skin:     General: Skin is warm and dry.   Neurological:      General: No focal deficit present.      Mental Status: He is alert and oriented to person, place, and time. Mental status is at baseline.   Psychiatric:         Mood and Affect: Mood normal.         Behavior: Behavior normal.         Thought Content: Thought content normal.         Judgment: Judgment normal.         Lab Results   Component Value Date/Time    TSHULTRASEN 2.010 08/02/2021 2004      No results found for: \"FREET4\"     Lab Results   Component Value Date/Time    HBA1C 5.9 (H) 04/18/2023 08:38 AM       Lab Results   Component Value Date/Time    CHOLSTRLTOT 98 (L) 04/25/2022 01:31 AM    LDL 30 04/25/2022 01:31 AM    HDL 59 04/25/2022 01:31 AM    TRIGLYCERIDE 44 04/25/2022 01:31 AM         Lab Results   Component Value Date/Time    SODIUM 138 01/04/2024 12:33 PM    POTASSIUM 4.3 01/04/2024 12:33 PM    CHLORIDE 103 01/04/2024 12:33 PM    CO2 23 01/04/2024 12:33 PM    GLUCOSE 123 (H) 01/04/2024 12:33 PM    BUN 11 01/04/2024 12:33 PM    CREATININE 0.83 01/04/2024 12:33 PM    BUNCREATRAT 15.0 07/08/2022 10:23 AM       Lab Results   Component Value Date/Time    ALKPHOSPHAT 100 (H) 01/04/2024 " 12:33 PM    ASTSGOT 21 01/04/2024 12:33 PM    ALTSGPT 21 01/04/2024 12:33 PM    TBILIRUBIN 0.6 01/04/2024 12:33 PM         Imaging  EKG demonstrates sinus rhythm with a noted left bundle branch block.  I reviewed interpreted EKG.  Findings are discussed with the patient as noted above    Echocardiogram  As noted above      Total patient time was estimated to be 20 minutes consisting of chart review, direct patient interaction, medication renewal, plan development and overall communication with the cardiovascular team.        Electronically signed by:   Cuauhtemoc Argueta DO, MPH  [facility]    Portions of this note were completed using voice recognition software (Dragon Naturally speaking software) . Occasional transcription errors may have escaped proof reading. I have made every reasonable attempt to correct obvious errors, but I expect that there are errors of grammar and possibly content that I did not discover before finalizing the note.

## 2024-02-22 NOTE — ASSESSMENT & PLAN NOTE
Regarding his heart failure with reduced ejection fraction he did see improvement back on his most recent echocardiogram with an ejection fraction of roughly 35%.  At this time he is borderline candidate for primary prevention for an arrhythmic death with subsequent AICD and consideration for possible biventricular pacemaker.  Will make further recommendations following his next echocardiogram.  It would not be unreasonable to consider possible resynchronization therapy.

## 2024-02-22 NOTE — ASSESSMENT & PLAN NOTE
Regarding his noted coronary artery disease he remains on appropriate medical therapy no changes are made to his medical therapy

## 2024-02-22 NOTE — ASSESSMENT & PLAN NOTE
Patient is scheduled to have a carotid ultrasound and will make further recommendations pending overall evaluation.  He does remain on a robust regimen for atherosclerosis of the vascular beds.

## 2024-02-22 NOTE — ASSESSMENT & PLAN NOTE
Regarding his paroxysmal atrial fibrillation.  He is not voicing any concerns of palpitations.  He is EKG demonstrates a sinus rhythm with a noted left bundle branch block.  He will continue with ongoing Eliquis therapy at this time.  No changes were made

## 2024-02-22 NOTE — PATIENT INSTRUCTIONS
Follow up as scheduled  Echocardiogram as scheduled  Continue current medications  Call with questions.

## 2024-02-23 LAB — EKG IMPRESSION: NORMAL

## 2024-02-23 PROCEDURE — 93010 ELECTROCARDIOGRAM REPORT: CPT | Performed by: INTERNAL MEDICINE

## 2024-03-22 ENCOUNTER — HOSPITAL ENCOUNTER (OUTPATIENT)
Dept: CARDIOLOGY | Facility: MEDICAL CENTER | Age: 79
End: 2024-03-22
Attending: INTERNAL MEDICINE
Payer: MEDICARE

## 2024-03-22 DIAGNOSIS — I50.22 HEART FAILURE WITH MILDLY REDUCED EJECTION FRACTION (HCC): ICD-10-CM

## 2024-03-22 PROCEDURE — 93306 TTE W/DOPPLER COMPLETE: CPT

## 2024-03-22 PROCEDURE — 700117 HCHG RX CONTRAST REV CODE 255: Performed by: INTERNAL MEDICINE

## 2024-03-22 RX ADMIN — HUMAN ALBUMIN MICROSPHERES AND PERFLUTREN 3 ML: 10; .22 INJECTION, SOLUTION INTRAVENOUS at 10:56

## 2024-03-27 LAB — LV EJECT FRACT MOD 4C 99902: 36.97

## 2024-03-27 PROCEDURE — 93306 TTE W/DOPPLER COMPLETE: CPT | Mod: 26 | Performed by: INTERNAL MEDICINE

## 2024-04-03 ENCOUNTER — APPOINTMENT (OUTPATIENT)
Dept: NEUROLOGY | Facility: MEDICAL CENTER | Age: 79
End: 2024-04-03
Attending: STUDENT IN AN ORGANIZED HEALTH CARE EDUCATION/TRAINING PROGRAM
Payer: MEDICARE

## 2024-04-03 VITALS
SYSTOLIC BLOOD PRESSURE: 128 MMHG | OXYGEN SATURATION: 96 % | HEIGHT: 73 IN | TEMPERATURE: 96.8 F | HEART RATE: 71 BPM | DIASTOLIC BLOOD PRESSURE: 62 MMHG | WEIGHT: 232.37 LBS | BODY MASS INDEX: 30.8 KG/M2

## 2024-04-03 DIAGNOSIS — Z86.73 HISTORY OF STROKE: ICD-10-CM

## 2024-04-03 DIAGNOSIS — I65.21 CAROTID STENOSIS, RIGHT: ICD-10-CM

## 2024-04-03 DIAGNOSIS — R40.4 TRANSIENT ALTERATION OF AWARENESS: ICD-10-CM

## 2024-04-03 DIAGNOSIS — I48.0 PAROXYSMAL ATRIAL FIBRILLATION (HCC): ICD-10-CM

## 2024-04-03 DIAGNOSIS — I25.5 ISCHEMIC CARDIOMYOPATHY: ICD-10-CM

## 2024-04-03 DIAGNOSIS — R26.81 GAIT INSTABILITY: ICD-10-CM

## 2024-04-03 DIAGNOSIS — G40.101: ICD-10-CM

## 2024-04-03 DIAGNOSIS — C44.622 SCC (SQUAMOUS CELL CARCINOMA), ARM, RIGHT: ICD-10-CM

## 2024-04-03 DIAGNOSIS — R47.89 WORD FINDING DIFFICULTY: ICD-10-CM

## 2024-04-03 DIAGNOSIS — Z98.890 HISTORY OF CAROTID ENDARTERECTOMY: ICD-10-CM

## 2024-04-03 PROCEDURE — 3078F DIAST BP <80 MM HG: CPT | Performed by: STUDENT IN AN ORGANIZED HEALTH CARE EDUCATION/TRAINING PROGRAM

## 2024-04-03 PROCEDURE — 3074F SYST BP LT 130 MM HG: CPT | Performed by: STUDENT IN AN ORGANIZED HEALTH CARE EDUCATION/TRAINING PROGRAM

## 2024-04-03 PROCEDURE — 99213 OFFICE O/P EST LOW 20 MIN: CPT | Performed by: STUDENT IN AN ORGANIZED HEALTH CARE EDUCATION/TRAINING PROGRAM

## 2024-04-03 PROCEDURE — 99212 OFFICE O/P EST SF 10 MIN: CPT | Performed by: STUDENT IN AN ORGANIZED HEALTH CARE EDUCATION/TRAINING PROGRAM

## 2024-04-03 RX ORDER — AMOXICILLIN 500 MG/1
CAPSULE ORAL
COMMUNITY
Start: 2024-01-16

## 2024-04-03 RX ORDER — LORAZEPAM 0.5 MG/1
TABLET ORAL
COMMUNITY
Start: 2024-02-13

## 2024-04-03 ASSESSMENT — FIBROSIS 4 INDEX: FIB4 SCORE: 1.55

## 2024-04-03 NOTE — PROGRESS NOTES
NEUROLOGY FOLLOW-UP - 04/03/2024     REASON FOR VISIT: Carl Blount 79 y.o. male presents today for follow-up       SUMMARY RELEVANT PAST MEDICAL HISTORY AND/OR COMPENDIUM OF RELEVANT WORK-UP AND TREATMENTS TO DATE:      INTERVAL HISTORY:      Patient returns for follow-up with his wife. He is doing very well on keppra 500 mg BID. He stopped having transient language problems after starting keppra. Wife has noticed a major difference as well. He is very pleased. Denies side effects.     We reviewed his ambulatory EEG which did not show any seizures or epileptic disorders, only focal slowing over the temporal-parietal region, which is a language area,    CURRENT MEDICATIONS AT THE TIME OF THIS ENCOUNTER:  Current Outpatient Medications on File Prior to Visit   Medication Sig Dispense Refill    amoxicillin (AMOXIL) 500 MG Cap TAKE 4 CAPSULE BY MOUTH 1 HOURS BEFORE DENTAL APPOINTMENT      LORazepam (ATIVAN) 0.5 MG Tab TAKE 1/2 TABLET BY MOUTH EVERY DAY AS NEEDED      sacubitril-valsartan (ENTRESTO) 24-26 MG Tab TAKE 1 TABLET BY MOUTH TWICE A DAY FOR HEART FAILURE --STOP LISINOPRIL 36 HOURS OR MORE BEFORE STARTING THIS MEDICATION      Empagliflozin (JARDIANCE) 25 MG Tab Take 12.5 mg by mouth every day.      metoprolol SR (TOPROL XL) 25 MG TABLET SR 24 HR Take 25 mg by mouth every day.      levETIRAcetam (KEPPRA) 500 MG Tab Take 1 Tablet by mouth 2 times a day. 60 Tablet 0    spironolactone (ALDACTONE) 25 MG Tab Take 1 Tablet by mouth every day. 90 Tablet 3    Mirabegron ER 50 MG TABLET SR 24 HR Take 50 mg by mouth every day.      hydroxychloroquine (PLAQUENIL) 200 MG Tab Take 200 mg by mouth 2 times a day.      diclofenac sodium (VOLTAREN) 1 % Gel Apply 4 g topically 4 times a day as needed. Apply to back   Indications: Joint Damage causing Pain and Loss of Function      vitamin D3 (CHOLECALCIFEROL) 1000 Unit (25 mcg) Tab Take 1,000 Units by mouth every day.      aspirin 81 MG EC tablet Take 81 mg by mouth  "every day.      atorvastatin (LIPITOR) 40 MG Tab Take 1 Tablet by mouth every evening. 100 Tablet 3    apixaban (ELIQUIS) 5mg Tab Take 1 Tablet by mouth 2 times a day. 180 Tablet 1    finasteride (PROSCAR) 5 MG Tab Take 5 mg by mouth every day.      loratadine (CLARITIN) 10 MG Tab Take 10 mg by mouth every day.      lidocaine (LIDODERM) 5 % Patch Place 1 Patch on the skin 1 time a day as needed (Pain).      traZODone (DESYREL) 100 MG Tab Take 100 mg by mouth every evening.      therapeutic multivitamin-minerals (THERAGRAN-M) Tab Take 1 tablet by mouth every day.      cyanocobalamin (VITAMIN B12) 1000 MCG Tab Take 1,000 mcg by mouth every day.      Omega-3 Fatty Acids (FISH OIL) 1000 MG Cap capsule Take 1,000 mg by mouth every day.      cyclobenzaprine (FLEXERIL) 10 MG Tab Take 10 mg by mouth 3 times a day as needed for Muscle Spasms.      pantoprazole (PROTONIX) 40 MG Tablet Delayed Response Take 40 mg by mouth every day.      tamsulosin (FLOMAX) 0.4 MG capsule Take 0.4 mg by mouth 2 times a day.       No current facility-administered medications on file prior to visit.          EXAM:   /62 (BP Location: Right arm, Patient Position: Sitting, BP Cuff Size: Adult)   Pulse 71   Temp 36 °C (96.8 °F) (Temporal)   Ht 1.854 m (6' 1\")   Wt 105 kg (232 lb 5.8 oz)   SpO2 96%    Wt Readings from Last 5 Encounters:   04/03/24 105 kg (232 lb 5.8 oz)   02/22/24 107 kg (236 lb)   01/08/24 103 kg (226 lb 3.1 oz)   01/04/24 104 kg (230 lb)   12/22/23 104 kg (230 lb)      Physical Exam:  Physical Exam  Constitutional:       General: He is awake.      Appearance: Normal appearance.   HENT:      Head: Normocephalic.      Mouth/Throat:      Mouth: Mucous membranes are dry.      Pharynx: No oropharyngeal exudate.   Eyes:      Extraocular Movements: EOM normal. No nystagmus.   Cardiovascular:      Rate and Rhythm: Normal rate and regular rhythm.      Heart sounds: Murmur heard.   Pulmonary:      Effort: Pulmonary effort is " normal.      Breath sounds: Normal breath sounds.   Skin:     General: Skin is dry.      Findings: Bruising and lesion present.   Neurological:      Mental Status: He is alert.        Neurological Exam   Neurological Exam  Mental Status  Awake and alert. Recalls 1 of 3 objects immediately. At 3 minutes recalls 3 of 3 objects. Fund of knowledge is appropriate for level of education.  Mild expressive aphasia noted. Word-finding difficulty. Decreased fluency. Only able to say 5 words that begin with letter F in 1 minute. No word substituions. Language percewption intact. Able to follow complex multistep commands.    Cranial Nerves  CN II: Right normal visual field. Left normal visual field.  CN III, IV, VI: Extraocular movements intact bilaterally. No nystagmus. Normal saccades. Diminished smooth pursuit.   Right pupil: 3 mm. Round. Abnormal reactivity:   Left pupil: 3 mm. Round. Abnormal reactivity:  CN V: Facial sensation is normal.  CN VII: Full and symmetric facial movement.  CN XII: Tongue midline without atrophy or fasciculations.    Motor   Right pronator drift.  Mild slowness to finger tapping on right. Clumsiness, ataxic with left fingers. Mild diffuse weakness of LE.    Sensory  Light touch is normal in upper and lower extremities.  No right-sided hemispatial neglect. No left-sided hemispatial neglect. Right extinction absent: Left extinction absent:    Coordination  Right: Finger-to-nose normal. Rapid alternating movement abnormality:Left: Finger-to-nose abnormality: Rapid alternating movement abnormality:    Gait  Casual gait: Narrow stance. Reduced stride length. Hesitant gait.         ASSESSMENT, EDUCATION, AND COUNSELING:  This is a 79 y.o. male patient who presents to the neurology clinic. We had an extensive discussion about the patient's symptoms, signs, and work-up to date, if any. We discussed potential and/or definitive diagnoses, work-up, and potential treatments.     PLAN:  If applicable, the  work-up such as labs, imaging, procedures, and/or other testing, referrals, and/or recommended treatment strategies are listed below.    Medications were administered today in clinic (if any):     Visit Diagnoses     ICD-10-CM   1. 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 Carotid stenosis, right  I65.21   3. Ischemic cardiomyopathy  I25.5   4. Gait instability  R26.81   5. History of stroke  Z86.73   6. History of carotid endarterectomy  Z98.890   7. Paroxysmal atrial fibrillation (HCC)  I48.0   8. SCC (squamous cell carcinoma), arm, right  C44.622   9. Transient alteration of awareness  R40.4   10. 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 No changes are indidcated. Will follow-up 5-6 months, sooner if needed        BILLING DOCUMENTATION:     The number of minutes of face-to-face time spent in this encounter was I spent a total of 20 minutes on the day of the visit.  . Over 50% of the time of the visit today was spent on counseling and/or coordination of care wtih the patient and/or family, as outlined above in assessment in plan.    Charles Martin MD  Department of Neurology at [facility]  Diplomate of the American Board of Psychiatry and Neurology, General Neurology  Diplomate of American Board of Psychiatry and Neurology, a Member Board of the American Board of Medical Subspecialties, Epilepsy  Director of [facility] Level III Comprehensive Epilepsy Program  Professor of Clinical Neurology, [facility].   [address], [address]  [facility]  [id number] [phone]   Fax: [phone]  E-mail: [email or web address]

## 2024-04-03 NOTE — PATIENT INSTRUCTIONS
NEUROLOGY CLINIC VISIT WITH DR. MARTIN     PLEASE READ THIS ENTIRE DOCUMENT CAREFULLY AND COMPLETELY:    First and foremost, you matter to Dr. Martin and you deserve the best care.   Dr. Martin prides himself on providing the best possible care to all his patients. He strives to make each appointment meaningful, so that all your concerns are being addressed and all your neurological problems are being optimally treated. In order to achieve these goals for everyone, Dr. Martin has listed important reminders and the best ways to prepare for each appointment. Please read each item carefully. Thank you!    Due to the high volume of patients we are trying to help, your physician will not be able to respond by phone or in [employer] to your routine concerns between appointments.  This does not reflect a lack of interest or concern for you or your diagnosis.  Please bring these questions and concerns to your appointment where your physician can answer.  Please relay more pressing concerns to our office, either via [employer], or by phone; if not able to reach us please visit nearby Urgent Care Center or Emergency Department.  If any emergent medical needs, please seek emergent medical help and/or call 911.    Also, please note that we are not able to fill out paperwork that might be related to your work, utility company, disability, and/or driving, among others, in between the visits.  Please schedule a dedicated appointment to address any and all paperwork.  This is not due to lack of concern or interest for your disease-related work/administrative problems, but to make sure that we provide the best possible care and to fill out your paperwork in a correct, complete, and timely manner.  ------------------------------------------------------------------------------------------  Please let our office know if you have any changes in your seizure frequency and/characteristics.     Please keep a diary of your seizures and bring it  with you to each appointment.    Please take vitamin D3 8413-3963 internation units daily.     Please abstain from driving until further notice    If you are a biological female with epilepsy who is of reproductive age, who is actively breastfeeding, and/or who infants/young children:  Please take folic acid 1 mg daily. This is an over-the-counter supplement that is recommended to prevent certain developmental problems in your baby, in case you become pregnant in the future.  It is critical that you let our office know as soon as you become pregnant or plan to become pregnant.  If you are caring for a baby/young child, please make sure to be sitting on a soft surface while holding your baby/young child, so in case you have a seizure, your baby/young child is not injured due to fall.   Please let us know if, while breastfeeding, you observe that your baby is excessively sleepy and/or has other behavioral changes. Because many antiseizure medications are collected in breast milk, some nursing babies can suffer adverse medication effects.    Please note that the following might precipitate seizures:   missed doses of antiseizure medications  being sick with a fever, stress  Fatigue  sleep deprivation or abnormal sleeping patterns  not eating regularly  not drinking enough water  drinking too much alcohol  stopping alcohol suddenly if you are currently using it on a regular/daily basis,   using recreational drugs, among others.    Please note that the following might lead to an injury or even be life-threatening in the event you have a seizure and/or lose awareness while:  being in a large body of water by yourself, such as bath, pool, lake, ocean, among others (risk of drowning)  being on unprotected heights (risk of fall)  being around and/or operating heavy machinery (risk of injury)  being around open fire/hot surfaces (risk of burns)  any other activities/circumstances, in which if you lose awareness, you might  injure yourself and/or others.  -------------------------------------------------------------------------------------------  SUDEP (SUDDEN UNEXPECTED DEATH IN EPILEPSY)  It is important that your seizures are well controlled and you have none or have them rarely. In addition to avoiding injury related to breakthrough seizures, frequent seizures increase risk of SUDEP (sudden unexpected death in epilepsy), where a person goes into a seizure and then never wakes up. The best way to prevent SUDEP is to control your seizures well.   ------------------------------------------------------------------------------------------  Please call for help (crisis line and/or 911) in case you have thoughts of harming yourself and/or others.  ------------------------------------------------------------------------------------------  INSTRUCTIONS FOR YOUR FAMILY/CAREGIVERS:  Please call 911 if the patient has a seizure longer than 2-3 minutes, if seizures are back to back without her recovering to her baseline, or she does not start recovering within 5-10 minutes after the seizure stops. During the seizure - please turn her on her side, please make sure her head is protected (for example, you should put a pillow under her head, if one is available), and please do not put anything in her mouth.   ------------------------------------------------------------------------------------------  PATIENT EXPECTATIONS,  IMPORTANT APPOINTMENT REMINDERS, AND ADDITIONAL HELPFUL TIPS:   REFILLS:   Request refills AT LEAST 1 week in advance to ensure you do not run out of medications    MyChart  It is STRONGLY encouraged that ALL patients sign up for MyChart. It is BY FAR the fastest and most convenient way for both Dr. Martin and patients to obtain timely refills.  If you are having trouble signing up or logging into your account, staff are available to help you. Please ask a medical assistant or staff at the  to assist you.    TEST RESULS:    All labs and diagnostic test results will be reviewed at your next visit, UNLESS  Dr. Martin determines that there are important findings on the tests need to be acted on sooner. Dr. Martin will either call or send a message through [employer] if this is the case.    BE PREPARED PRIOR TO EVERY APPOINTMENT:  All patient are responsible for ensuring that ALL test results that were completed outside of the [employer] system have been received by our Neurology Department PRIOR to your appointment with Dr. Martin.    IMPORTANT:  ALL images (not just the reports) must be sent and uploaded to the [employer] system. Dr. Martin reviews all images personally prior to each visit. Ensuring that ALL the test results and test images are accessible to Dr. Martin prior to your appointment is YOUR responsibility and an important part of making the most out of each appointment.   Bring a government-issues picture ID and an updated insurance card EVERY visit.  It is highly recommended that you bring at every visit a list of the most important topics that you want address. While it may not be possible to address all items on the list in a single visit, preparing a list will ensure that Dr. Martin addresses the items that are most important to you and your health    PAPERWORK, DOCUMENTATION, LETTER REQUESTS:  You must notify the office ahead of your appointment of all paperwork or letter requests.   Please DO NOT wait until the last minute to make these requests. Please give all paperwork to the medical assistant at the start of the appointment and check-in process. Please note that Dr. Martin may not be able complete some types of documentation in a single appointment or even within a single day or week. This is why it is important to communicate paperwork requests prior to your appointment and at least 2 weeks prior to any deadlines.    KNOW ALL YOU MEDICATIONS:   AT EVERY SINGLE APPOINTMENT, please bring a list of every single prescribed,  non-prescribed, and over the counter medication or supplements you are taking, including ones taken on a rare or intermittent basis.  Include the following information for each prescribed or non-prescribed medications:  Name of medication   The strength of EACH pill/capsule/tablet, etc.   The number of pills/capsules/tablets, etc taken per dose  The number and time of day that doses are taken  For every single Supplement that you take on a routine or intermittent basis, you must include:  The Brand Name   A complete list of every single ingredient, compound, vitamin, and/or mineral in each dose, along with the corresponding amounts/strengths of all ingredients, vitamins, minerals, etc., if such information is provided or known  The number of doses taken per day and time of day doses are taken  If medications are taken on an intermittent or as needed basis, please estimate how many days per week or days per month the medications are used  DO NOT just print out your medication list from [employer] or bring a list from a prior appointment or hospitalizations because the information is often often unreliable, inaccurate, outdated, and/or incomplete   The list should be printed or written  If you forget or do not have a list of all the medication, then it is acceptable, although less preferred, to bring all the bottles to the appointment     ARRIVE EARLY FOR ALL VISITS:  Please note that we are unable to accommodate late arrivals as per office policy.  YOU-the patient - (NOT a parent, spouse, or friend) must be physically present at check-in no later than 12 minutes after the scheduled appointment time, or you will be asked to reschedule   Consider scheduling a virtual appointment with Dr. Martin through [employer] as an alternative if transportation to the clinic is difficult or unavailable   Please note, however, that virtual visits can only be scheduled after being an established patient of Dr. Martin. All new appointments  "must be done in-person in clinic  Some insurances will not cover the cost of virtual appointments. Please check with your insurance to find out if these visits are covered    COMMUNICATING URGENT AND NON-URGENT MATTERS:  Your concerns are important and deserve to be heard and addressed. If you have an urgent matter, there are two methods that will ensure your concerns are prioritized appropriately:   Preferred method: Sign-up/Login to your [email or web address] account and send a message addressed to Dr. Martin or Angela Johnson (Dr. Martin's assistant). In the subject line, type \"urgent\" followed by a word or phrase describing the situation (For example, write \"Urgent: Out of antiseuzre med and need refill\" or \"Urgent: Severe side effects to new meds\". In doing this, our staff can ensure urgent messages are triaged appropriately and communicated to Dr. Martin that day.  Call [facility] main line at [phone]. Dr. Martin's voicemail extension is [phone]. When leaving a voice message, specifically indicate if it is urgent (or non-urgent) so that the matter can be triaged appropriately and addressed in a timely manner    Thank you for entrusting your neurological care to [facility] and we look forward to continuing to serve you.   "

## 2024-04-08 ENCOUNTER — OFFICE VISIT (OUTPATIENT)
Dept: CARDIOLOGY | Facility: MEDICAL CENTER | Age: 79
End: 2024-04-08
Attending: INTERNAL MEDICINE
Payer: MEDICARE

## 2024-04-08 VITALS
DIASTOLIC BLOOD PRESSURE: 60 MMHG | BODY MASS INDEX: 29.95 KG/M2 | WEIGHT: 226 LBS | HEIGHT: 73 IN | SYSTOLIC BLOOD PRESSURE: 102 MMHG | RESPIRATION RATE: 17 BRPM

## 2024-04-08 DIAGNOSIS — E78.2 MIXED HYPERLIPIDEMIA: ICD-10-CM

## 2024-04-08 DIAGNOSIS — I48.0 PAROXYSMAL ATRIAL FIBRILLATION (HCC): ICD-10-CM

## 2024-04-08 DIAGNOSIS — I25.10 CORONARY ARTERY DISEASE INVOLVING NATIVE HEART WITHOUT ANGINA PECTORIS, UNSPECIFIED VESSEL OR LESION TYPE: ICD-10-CM

## 2024-04-08 DIAGNOSIS — I50.22 HEART FAILURE WITH MILDLY REDUCED EJECTION FRACTION (HCC): ICD-10-CM

## 2024-04-08 PROCEDURE — 99214 OFFICE O/P EST MOD 30 MIN: CPT | Performed by: INTERNAL MEDICINE

## 2024-04-08 PROCEDURE — 99213 OFFICE O/P EST LOW 20 MIN: CPT | Performed by: INTERNAL MEDICINE

## 2024-04-08 PROCEDURE — 93005 ELECTROCARDIOGRAM TRACING: CPT | Performed by: INTERNAL MEDICINE

## 2024-04-08 PROCEDURE — 3078F DIAST BP <80 MM HG: CPT | Performed by: INTERNAL MEDICINE

## 2024-04-08 PROCEDURE — 3074F SYST BP LT 130 MM HG: CPT | Performed by: INTERNAL MEDICINE

## 2024-04-08 ASSESSMENT — FIBROSIS 4 INDEX: FIB4 SCORE: 1.55

## 2024-04-08 NOTE — ASSESSMENT & PLAN NOTE
Regarding his paroxysmal atrial fibrillation.  He is not voicing any concerns of palpitations suggestive of recurrence of his paroxysmal atrial fibrillation.  He is EKG demonstrates a sinus rhythm with a noted left bundle branch block (chronic).  He will continue with ongoing Eliquis therapy at this time.  No changes were made

## 2024-04-08 NOTE — ASSESSMENT & PLAN NOTE
Regarding his heart failure with reduced ejection fraction he did see improvement back on his most recent echocardiogram with an ejection fraction of roughly 35% which is slightly improved to roughly 35 to 40%.  At this time he is borderline candidate for primary prevention for an arrhythmic death with subsequent AICD and consideration for possible biventricular pacemaker.  Will make further recommendations following his next echocardiogram.  It would not be unreasonable to consider possible resynchronization therapy.  I suspect that his chronic left bundle branch block is contributing to his overall heart failure with mildly reduced ejection fraction.  I would not recommend device therapy at this time given that the patient is not having any active symptoms.  Will continue to monitor clinically and he is largely New [place] Heart Association class II and ACC/AHA stage C

## 2024-04-08 NOTE — PROGRESS NOTES
[facility]    PatientName:Carl HighelsDate: 2024  :1945    79 y.o.PCP:Zuleyma Cuevas M.D.  MRN:[id number]        Problems and Plans    Coronary artery disease involving native heart without angina pectoris  Regarding his noted coronary artery disease he remains on appropriate medical therapy no changes are made to his medical therapy and he is not having active symptoms suggestive of ischemia    Mixed hyperlipidemia  Regarding his dyslipidemia he remains on statin therapy and is also closely monitoring his diet.    Paroxysmal atrial fibrillation (HCC)  Regarding his paroxysmal atrial fibrillation.  He is not voicing any concerns of palpitations suggestive of recurrence of his paroxysmal atrial fibrillation.  He is EKG demonstrates a sinus rhythm with a noted left bundle branch block (chronic).  He will continue with ongoing Eliquis therapy at this time.  No changes were made    Heart failure with mildly reduced ejection fraction (HCC)  Regarding his heart failure with reduced ejection fraction he did see improvement back on his most recent echocardiogram with an ejection fraction of roughly 35% which is slightly improved to roughly 35 to 40%.  At this time he is borderline candidate for primary prevention for an arrhythmic death with subsequent AICD and consideration for possible biventricular pacemaker.  Will make further recommendations following his next echocardiogram.  It would not be unreasonable to consider possible resynchronization therapy.  I suspect that his chronic left bundle branch block is contributing to his overall heart failure with mildly reduced ejection fraction.  I would not recommend device therapy at this time given that the patient is not having any active symptoms.  Will continue to monitor clinically and he is largely New [place] Heart Association class II and ACC/AHA stage C    Return in about 6 months (around  10/8/2024).      Encounter    Reason for Visit / Chief Complaint: Coronary artery disease/heart failure with reduced ejection fraction    HPI    79-year-old male with known history of heart failure with reduced ejection fraction, coronary artery disease with prior drug-eluting stent placed into the right coronary artery consisting of a Xience 2.25 x 12 mm postdilated to 2.5 mm, hypertension, dyslipidemia, osteoarthritis, prior TIA, carotid artery disease presents in clinic for ongoing management of his heart failure with reduced ejection fraction     Currently, he notes he is feeling well and not having any adverse cardiovascular complaints.  He is heading down to [place] with his wife and also heading to [place] over the course of the next 3 to 4 months.  He has been taking his medications and not having any adverse side effects.    Most recent cardiovascular workup consists of echocardiogram demonstrating mild concentric left ventricular approach for with noted ejection fraction of 35 to 40% with grade 1 diastolic dysfunction with a known TAVR valve functioning appropriately  Past Medical History  Past Medical History:   Diagnosis Date    Afib (HCC)     Atrial fibrillation (HCC)     Hyperlipidemia     Hypertension     Stroke (HCC)      Past Surgical History  History reviewed. No pertinent surgical history.  Social History  Social History     Socioeconomic History    Marital status:      Spouse name: Not on file    Number of children: Not on file    Years of education: Not on file    Highest education level: Not on file   Occupational History    Not on file   Tobacco Use    Smoking status: Former    Smokeless tobacco: Never   Vaping Use    Vaping Use: Never used   Substance and Sexual Activity    Alcohol use: Yes     Comment: 2 drinks/night    Drug use: Never    Sexual activity: Not Currently   Other Topics Concern    Not on file   Social History Narrative    Not on file     Social Determinants of Health      Financial Resource Strain: Low Risk  (4/29/2022)    Overall Financial Resource Strain (CARDIA)     Difficulty of Paying Living Expenses: Not hard at all   Food Insecurity: No Food Insecurity (4/29/2022)    Hunger Vital Sign     Worried About Running Out of Food in the Last Year: Never true     Ran Out of Food in the Last Year: Never true   Transportation Needs: No Transportation Needs (4/29/2022)    PRAPARE - Transportation     Lack of Transportation (Medical): No     Lack of Transportation (Non-Medical): No   Physical Activity: Not on file   Stress: Not on file   Social Connections: Not on file   Intimate Partner Violence: Not on file   Housing Stability: Not on file     Past Family History  History reviewed. No pertinent family history.  Medication(s)    Current Outpatient Medications:     amoxicillin, TAKE 4 CAPSULE BY MOUTH 1 HOURS BEFORE DENTAL APPOINTMENT, PRN    LORazepam, TAKE 1/2 TABLET BY MOUTH EVERY DAY AS NEEDED, Taking    sacubitril-valsartan, TAKE 1 TABLET BY MOUTH TWICE A DAY FOR HEART FAILURE --STOP LISINOPRIL 36 HOURS OR MORE BEFORE STARTING THIS MEDICATION, Taking    Jardiance, 12.5 mg, Oral, DAILY, Taking    metoprolol SR, 25 mg, Oral, DAILY, Taking    levETIRAcetam, 500 mg, Oral, BID, Taking    spironolactone, 25 mg, Oral, DAILY, Taking    Mirabegron ER, 50 mg, Oral, DAILY, Taking    hydroxychloroquine, 200 mg, Oral, BID, Taking    diclofenac sodium, 4 g, Topical, 4X/DAY PRN, Taking    vitamin D3, 1,000 Units, Oral, DAILY, Taking    aspirin, 81 mg, Oral, DAILY, Taking    atorvastatin, 40 mg, Oral, Q EVENING, Taking    apixaban, 5 mg, Oral, BID, Taking    finasteride, 5 mg, Oral, QDAY, Taking    loratadine, 10 mg, Oral, DAILY, Taking    lidocaine, 1 Patch, Transdermal, QDAY PRN, Taking    traZODone, 100 mg, Oral, Nightly, Taking    therapeutic multivitamin-minerals, 1 Tablet, Oral, DAILY, Taking    cyanocobalamin, 1,000 mcg, Oral, DAILY, Taking    fish oil, 1,000 mg, Oral, DAILY, Taking     "cyclobenzaprine, 10 mg, Oral, TID PRN, Taking    pantoprazole, 40 mg, Oral, DAILY, Taking    tamsulosin, 0.4 mg, Oral, BID, Taking  Allergies  Patient has no known allergies.    Review of Systems    A comprehensive 10 system review was conducted and is negative except as noted above in the HPI or here.      Vital Signs  /60 (BP Location: Left arm, Patient Position: Sitting, BP Cuff Size: Adult)   Resp 17   Ht 1.854 m (6' 1\")   Wt 103 kg (226 lb)   BMI 29.82 kg/m²     Physical Exam  Constitutional:       Appearance: Normal appearance. He is obese.   HENT:      Head: Normocephalic and atraumatic.      Mouth/Throat:      Mouth: Mucous membranes are moist.      Pharynx: Oropharynx is clear.   Eyes:      Extraocular Movements: Extraocular movements intact.      Conjunctiva/sclera: Conjunctivae normal.   Cardiovascular:      Rate and Rhythm: Normal rate and regular rhythm.      Pulses: Normal pulses.      Heart sounds: Normal heart sounds. No murmur heard.     No friction rub. No gallop.   Pulmonary:      Effort: Pulmonary effort is normal.      Breath sounds: Normal breath sounds.   Abdominal:      General: Bowel sounds are normal.      Palpations: Abdomen is soft.   Musculoskeletal:         General: Normal range of motion.      Cervical back: Normal range of motion and neck supple.   Skin:     General: Skin is warm and dry.   Neurological:      General: No focal deficit present.      Mental Status: He is alert and oriented to person, place, and time. Mental status is at baseline.   Psychiatric:         Mood and Affect: Mood normal.         Behavior: Behavior normal.         Thought Content: Thought content normal.         Judgment: Judgment normal.         Lab Results   Component Value Date/Time    TSHULTRASEN 2.010 08/02/2021 2004      No results found for: \"FREET4\"     Lab Results   Component Value Date/Time    HBA1C 5.9 (H) 04/18/2023 08:38 AM       Lab Results   Component Value Date/Time    CHOLSTRLTOT 98 " (L) 04/25/2022 01:31 AM    LDL 30 04/25/2022 01:31 AM    HDL 59 04/25/2022 01:31 AM    TRIGLYCERIDE 44 04/25/2022 01:31 AM         Lab Results   Component Value Date/Time    SODIUM 138 01/04/2024 12:33 PM    POTASSIUM 4.3 01/04/2024 12:33 PM    CHLORIDE 103 01/04/2024 12:33 PM    CO2 23 01/04/2024 12:33 PM    GLUCOSE 123 (H) 01/04/2024 12:33 PM    BUN 11 01/04/2024 12:33 PM    CREATININE 0.83 01/04/2024 12:33 PM    BUNCREATRAT 15.0 07/08/2022 10:23 AM       Lab Results   Component Value Date/Time    ALKPHOSPHAT 100 (H) 01/04/2024 12:33 PM    ASTSGOT 21 01/04/2024 12:33 PM    ALTSGPT 21 01/04/2024 12:33 PM    TBILIRUBIN 0.6 01/04/2024 12:33 PM         Imaging  EKG demonstrates sinus rhythm with intermittent PVCs and a noted left bundle branch block.  I reviewed interpreted EKG.  Findings are discussed with the patient        Total patient time was estimated to be 30 minutes consisting of chart review, direct patient interaction, medication renewal, plan development and overall communication with the cardiovascular team.        Electronically signed by:   Cuauhtemoc Argueta DO, MPH  [facility]    Portions of this note were completed using voice recognition software (Dragon Naturally speaking software) . Occasional transcription errors may have escaped proof reading. I have made every reasonable attempt to correct obvious errors, but I expect that there are errors of grammar and possibly content that I did not discover before finalizing the note.

## 2024-04-12 LAB — EKG IMPRESSION: NORMAL

## 2024-04-12 PROCEDURE — 93010 ELECTROCARDIOGRAM REPORT: CPT | Performed by: INTERNAL MEDICINE

## 2024-04-29 ENCOUNTER — APPOINTMENT (OUTPATIENT)
Dept: RADIOLOGY | Facility: MEDICAL CENTER | Age: 79
End: 2024-04-29
Attending: SURGERY
Payer: MEDICARE

## 2024-05-28 DIAGNOSIS — I65.21 CAROTID STENOSIS, ASYMPTOMATIC, RIGHT: ICD-10-CM

## 2024-06-10 ENCOUNTER — TELEPHONE (OUTPATIENT)
Dept: VASCULAR SURGERY | Facility: MEDICAL CENTER | Age: 79
End: 2024-06-10
Payer: MEDICARE

## 2024-06-11 ENCOUNTER — TELEPHONE (OUTPATIENT)
Dept: VASCULAR SURGERY | Facility: MEDICAL CENTER | Age: 79
End: 2024-06-11
Payer: MEDICARE

## 2024-06-25 ENCOUNTER — APPOINTMENT (OUTPATIENT)
Dept: RADIOLOGY | Facility: MEDICAL CENTER | Age: 79
End: 2024-06-25
Attending: SURGERY
Payer: MEDICARE

## 2024-07-10 ENCOUNTER — APPOINTMENT (OUTPATIENT)
Dept: RADIOLOGY | Facility: MEDICAL CENTER | Age: 79
End: 2024-07-10
Attending: SURGERY
Payer: MEDICARE

## 2024-07-10 DIAGNOSIS — I65.21 CAROTID STENOSIS, ASYMPTOMATIC, RIGHT: ICD-10-CM

## 2024-07-10 PROCEDURE — 93880 EXTRACRANIAL BILAT STUDY: CPT

## 2024-07-10 PROCEDURE — 93880 EXTRACRANIAL BILAT STUDY: CPT | Mod: 26 | Performed by: INTERNAL MEDICINE

## 2024-07-16 ENCOUNTER — OFFICE VISIT (OUTPATIENT)
Dept: VASCULAR SURGERY | Facility: MEDICAL CENTER | Age: 79
End: 2024-07-16
Payer: MEDICARE

## 2024-07-16 VITALS
HEART RATE: 63 BPM | DIASTOLIC BLOOD PRESSURE: 48 MMHG | HEIGHT: 69 IN | WEIGHT: 225 LBS | TEMPERATURE: 97.6 F | OXYGEN SATURATION: 97 % | BODY MASS INDEX: 33.33 KG/M2 | SYSTOLIC BLOOD PRESSURE: 122 MMHG

## 2024-07-16 DIAGNOSIS — I65.21 CAROTID STENOSIS, ASYMPTOMATIC, RIGHT: ICD-10-CM

## 2024-07-16 PROCEDURE — 99213 OFFICE O/P EST LOW 20 MIN: CPT | Performed by: SURGERY

## 2024-07-16 PROCEDURE — 3074F SYST BP LT 130 MM HG: CPT | Performed by: SURGERY

## 2024-07-16 PROCEDURE — 3078F DIAST BP <80 MM HG: CPT | Performed by: SURGERY

## 2024-07-16 ASSESSMENT — FIBROSIS 4 INDEX: FIB4 SCORE: 1.55

## 2024-08-15 ENCOUNTER — TELEPHONE (OUTPATIENT)
Dept: CARDIOLOGY | Facility: MEDICAL CENTER | Age: 79
End: 2024-08-15
Payer: MEDICARE

## 2024-08-15 NOTE — TELEPHONE ENCOUNTER
Last OV: 04/08/2024  Proposed Surgery: EGD with Deep Sedation  Surgery Date: 09/25/2024  Requesting Office Name: GI  Fax Number: [phone]  Preference of Location (default is surgery center unless specified by Cardiologist or OLEG)  Prior Clearance Addressed: No      Anticoags/Antiplatelets: Apixaban   Anticoags/Antiplatelet managed by Cardiology? No Provider to advise.    Outstanding Cardiac Imaging : No  Stent, Cardiac Devices, or Catheterization: No  Ablation, TAVR/Valve (including open heart), Cardioversion: No  Recent Cardiac Hospitalization: No            When: N/A  History (cardiac history):   Past Medical History:   Diagnosis Date    Afib (HCC)     Atrial fibrillation (HCC)     Hyperlipidemia     Hypertension     Stroke (HCC)              Surgical Clearance Letter Sent: No Provider to advise.   **Scan clearance request letter into [facility].**

## 2024-08-15 NOTE — LETTER
PROCEDURE/SURGERY CLEARANCE FORM      Encounter Date: 8/15/2024    Patient: Carl Blount  YOB: 1945    CARDIOLOGIST:  Cuauhtemoc Argueta D.O.    REFERRING DOCTOR:  No ref. provider found    Procedure/surgery: EGD with Deep Sedation                                            Additional comments: Patient is currently optimized to undergo endoscopic evaluation. I would not recommend any changes to his current medical therapy aside from holding his Eliquis 48 hours prior to procedure and restarting when clinically appropriate likely within 24 to 48 hours after procedure. Patient is intermediate risk for an intermediate risk procedure. If the procedure is not completed within the next 60 days please refer the patient back to the cardiology department for further optimization and restratification     PROCEDURE/SURGERY CLEARANCE FORM    Date: 8/16/2024   Patient Name: Carl Blount    Dear Surgeon or Proceduralist,      Thank you for your request for cardiac stratification of our mutual patient Carl Blount 1945. We have reviewed their [facility] records; and to the best of our understanding this patient has not had stenting, ablation, cardiothoracic surgery or hospitalization for cardiovascular reasons in the past 6 months.  Carl Blount has been seen within the past 18 months and is considered to have non-modifiable cardiac risk for this low-risk procedure/surgery. They may proceed from a cardiovascular standpoint and may hold their antiplatelet/anticoagulation as briefly as possible. Please have patient resume this medication when hemodynamically stable to do so.     Aspirin or Prasugrel   - hold 7 days prior to procedure/surgery, resume when hemodynamically stable      Clopidrogrel or Ticagrelor  - hold 7 days for all neurological procedures, hold 5 days prior to all other procedure/surgery,  resume when hemodynamically stable     Warfarin - hold 7 days for all  neurological procedures, hold 5 days prior to all other procedure/surgery and coordinate with [facility] ([phone]) INR testing and dose management.      Pradaxa/Xarelto/Eliquis/Savesya - hold 1 day prior to procedure for low bleeding risk procedure, 2 days for high bleeding risk procedure, or consider holding 3 days or longer for patients with reduced kidney function (CrCl <30mL/min) or spinal/cranial surgeries/procedures.      If they have a mechanical heart valve, please coordinate with [facility] ([phone]) the proper management of their anticoagulant in the periprocedural or perioperative period.      Some patients have higher risk for cardiovascular complications or holding medication. If our patient has had prior complications of holding antiplatelet or anticoagulants in the past and we have seen them after these events, we have addressed these concerns with the patient. They are at an unknown degree of increased risk for recurrent complication.  You may hold anticoagulation/antiplatelets for the procedure or surgery if the benefits of the procedure or surgery outweigh this nonmodifiable risk.      If Carl Blount 1945 has new symptoms of heart failure decompensation, unstable arrythmia, or angina please reach out and we will assess the patient.      If you have other patient-specific concerns, please feel free to reach out to the patient's cardiologist directly at [phone].         Thank you,       [facility]      Electronically signed        MD Signature   Cuauhtemoc Argueta D.O.

## 2024-09-05 ENCOUNTER — OFFICE VISIT (OUTPATIENT)
Dept: NEUROLOGY | Facility: MEDICAL CENTER | Age: 79
End: 2024-09-05
Attending: STUDENT IN AN ORGANIZED HEALTH CARE EDUCATION/TRAINING PROGRAM
Payer: MEDICARE

## 2024-09-05 ENCOUNTER — TELEPHONE (OUTPATIENT)
Dept: NEUROLOGY | Facility: MEDICAL CENTER | Age: 79
End: 2024-09-05

## 2024-09-05 VITALS
RESPIRATION RATE: 16 BRPM | OXYGEN SATURATION: 98 % | HEART RATE: 51 BPM | DIASTOLIC BLOOD PRESSURE: 78 MMHG | WEIGHT: 235.23 LBS | TEMPERATURE: 97 F | BODY MASS INDEX: 31.18 KG/M2 | HEIGHT: 73 IN | SYSTOLIC BLOOD PRESSURE: 136 MMHG

## 2024-09-05 DIAGNOSIS — R29.818 TRANSIENT NEUROLOGICAL SYMPTOMS: ICD-10-CM

## 2024-09-05 DIAGNOSIS — R26.81 GAIT INSTABILITY: ICD-10-CM

## 2024-09-05 DIAGNOSIS — Z86.73 HISTORY OF STROKE: ICD-10-CM

## 2024-09-05 DIAGNOSIS — G40.101: ICD-10-CM

## 2024-09-05 DIAGNOSIS — I65.21 CAROTID STENOSIS, RIGHT: ICD-10-CM

## 2024-09-05 DIAGNOSIS — I25.5 ISCHEMIC CARDIOMYOPATHY: ICD-10-CM

## 2024-09-05 DIAGNOSIS — I48.0 PAROXYSMAL ATRIAL FIBRILLATION (HCC): ICD-10-CM

## 2024-09-05 PROCEDURE — 3078F DIAST BP <80 MM HG: CPT | Performed by: STUDENT IN AN ORGANIZED HEALTH CARE EDUCATION/TRAINING PROGRAM

## 2024-09-05 PROCEDURE — 99213 OFFICE O/P EST LOW 20 MIN: CPT | Performed by: STUDENT IN AN ORGANIZED HEALTH CARE EDUCATION/TRAINING PROGRAM

## 2024-09-05 PROCEDURE — 99212 OFFICE O/P EST SF 10 MIN: CPT | Performed by: STUDENT IN AN ORGANIZED HEALTH CARE EDUCATION/TRAINING PROGRAM

## 2024-09-05 PROCEDURE — 3075F SYST BP GE 130 - 139MM HG: CPT | Performed by: STUDENT IN AN ORGANIZED HEALTH CARE EDUCATION/TRAINING PROGRAM

## 2024-09-05 RX ORDER — LEVETIRACETAM 750 MG/1
750 TABLET ORAL 2 TIMES DAILY
Qty: 180 TABLET | Refills: 3 | Status: SHIPPED | OUTPATIENT
Start: 2024-09-05 | End: 2024-09-10 | Stop reason: SDUPTHER

## 2024-09-05 ASSESSMENT — FIBROSIS 4 INDEX: FIB4 SCORE: 1.55

## 2024-09-05 NOTE — PATIENT INSTRUCTIONS
NEUROLOGY CLINIC VISIT WITH DR. MARTIN     PLEASE READ THIS ENTIRE DOCUMENT CAREFULLY AND COMPLETELY:    First and foremost, you matter to Dr. Martin and you deserve the best care.   Dr. Martin prides himself on providing the best possible care to all his patients. He strives to make each appointment meaningful, so that all your concerns are being addressed and all your neurological problems are being optimally treated. In order to achieve these goals for everyone, Dr. Martin has listed important reminders and the best ways to prepare for each appointment. Please read each item carefully. Thank you!    Due to the high volume of patients we are trying to help, your physician will not be able to respond by phone or in [employer] to your routine concerns between appointments.  This does not reflect a lack of interest or concern for you or your diagnosis.  Please bring these questions and concerns to your appointment where your physician can answer.  Please relay more pressing concerns to our office, either via [employer], or by phone; if not able to reach us please visit nearby Urgent Care Center or Emergency Department.  If any emergent medical needs, please seek emergent medical help and/or call 911.    Also, please note that we are not able to fill out paperwork that might be related to your work, utility company, disability, and/or driving, among others, in between the visits.  Please schedule a dedicated appointment to address any and all paperwork.  This is not due to lack of concern or interest for your disease-related work/administrative problems, but to make sure that we provide the best possible care and to fill out your paperwork in a correct, complete, and timely manner.  ------------------------------------------------------------------------------------------  Please let our office know if you have any changes in your seizure frequency and/characteristics.     Please keep a diary of your seizures and bring it  with you to each appointment.    Please take vitamin D3 [id number] internation units daily.     Please abstain from driving until further notice    If you are a biological female with epilepsy who is of reproductive age, who is actively breastfeeding, and/or who infants/young children:  Please take folic acid 1 mg daily. This is an over-the-counter supplement that is recommended to prevent certain developmental problems in your baby, in case you become pregnant in the future.  It is critical that you let our office know as soon as you become pregnant or plan to become pregnant.  If you are caring for a baby/young child, please make sure to be sitting on a soft surface while holding your baby/young child, so in case you have a seizure, your baby/young child is not injured due to fall.   Please let us know if, while breastfeeding, you observe that your baby is excessively sleepy and/or has other behavioral changes. Because many antiseizure medications are collected in breast milk, some nursing babies can suffer adverse medication effects.    Please note that the following might precipitate seizures:   missed doses of antiseizure medications  being sick with a fever, stress  Fatigue  sleep deprivation or abnormal sleeping patterns  not eating regularly  not drinking enough water  drinking too much alcohol  stopping alcohol suddenly if you are currently using it on a regular/daily basis,   using recreational drugs, among others.    Please note that the following might lead to an injury or even be life-threatening in the event you have a seizure and/or lose awareness while:  being in a large body of water by yourself, such as bath, pool, lake, ocean, among others (risk of drowning)  being on unprotected heights (risk of fall)  being around and/or operating heavy machinery (risk of injury)  being around open fire/hot surfaces (risk of burns)  any other activities/circumstances, in which if you lose awareness, you might  injure yourself and/or others.  -------------------------------------------------------------------------------------------  SUDEP (SUDDEN UNEXPECTED DEATH IN EPILEPSY)  It is important that your seizures are well controlled and you have none or have them rarely. In addition to avoiding injury related to breakthrough seizures, frequent seizures increase risk of SUDEP (sudden unexpected death in epilepsy), where a person goes into a seizure and then never wakes up. The best way to prevent SUDEP is to control your seizures well.   ------------------------------------------------------------------------------------------  Please call for help (crisis line and/or 911) in case you have thoughts of harming yourself and/or others.  ------------------------------------------------------------------------------------------  INSTRUCTIONS FOR YOUR FAMILY/CAREGIVERS:  Please call 911 if the patient has a seizure longer than 2-3 minutes, if seizures are back to back without her recovering to her baseline, or she does not start recovering within 5-10 minutes after the seizure stops. During the seizure - please turn her on her side, please make sure her head is protected (for example, you should put a pillow under her head, if one is available), and please do not put anything in her mouth.   ------------------------------------------------------------------------------------------  PATIENT EXPECTATIONS,  IMPORTANT APPOINTMENT REMINDERS, AND ADDITIONAL HELPFUL TIPS:   REFILLS:   Request refills AT LEAST 1 week in advance to ensure you do not run out of medications    MyChart  It is STRONGLY encouraged that ALL patients sign up for MyChart. It is BY FAR the fastest and most convenient way for both Dr. Martin and patients to obtain timely refills.  If you are having trouble signing up or logging into your account, staff are available to help you. Please ask a medical assistant or staff at the  to assist you.    TEST RESULS:    All labs and diagnostic test results will be reviewed at your next visit, UNLESS  Dr. Martin determines that there are important findings on the tests need to be acted on sooner. Dr. Martin will either call or send a message through [employer] if this is the case.    BE PREPARED PRIOR TO EVERY APPOINTMENT:  All patient are responsible for ensuring that ALL test results that were completed outside of the [facility] system have been received by our Neurology Department PRIOR to your appointment with Dr. Martin.    IMPORTANT:  ALL images (not just the reports) must be sent and uploaded to the [facility] system. Dr. Martin reviews all images personally prior to each visit. Ensuring that ALL the test results and test images are accessible to Dr. Martin prior to your appointment is YOUR responsibility and an important part of making the most out of each appointment.   Bring a government-issues picture ID and an updated insurance card EVERY visit.  It is highly recommended that you bring at every visit a list of the most important topics that you want address. While it may not be possible to address all items on the list in a single visit, preparing a list will ensure that Dr. Martin addresses the items that are most important to you and your health    PAPERWORK, DOCUMENTATION, LETTER REQUESTS:  You must notify the office ahead of your appointment of all paperwork or letter requests.   Please DO NOT wait until the last minute to make these requests. Please give all paperwork to the medical assistant at the start of the appointment and check-in process. Please note that Dr. Martin may not be able complete some types of documentation in a single appointment or even within a single day or week. This is why it is important to communicate paperwork requests prior to your appointment and at least 2 weeks prior to any deadlines.    KNOW ALL YOU MEDICATIONS:   AT EVERY SINGLE APPOINTMENT, please bring a list of every single prescribed,  non-prescribed, and over the counter medication or supplements you are taking, including ones taken on a rare or intermittent basis.  Include the following information for each prescribed or non-prescribed medications:  Name of medication   The strength of EACH pill/capsule/tablet, etc.   The number of pills/capsules/tablets, etc taken per dose  The number and time of day that doses are taken  For every single Supplement that you take on a routine or intermittent basis, you must include:  The Brand Name   A complete list of every single ingredient, compound, vitamin, and/or mineral in each dose, along with the corresponding amounts/strengths of all ingredients, vitamins, minerals, etc., if such information is provided or known  The number of doses taken per day and time of day doses are taken  If medications are taken on an intermittent or as needed basis, please estimate how many days per week or days per month the medications are used  DO NOT just print out your medication list from [employer] or bring a list from a prior appointment or hospitalizations because the information is often often unreliable, inaccurate, outdated, and/or incomplete   The list should be printed or written  If you forget or do not have a list of all the medication, then it is acceptable, although less preferred, to bring all the bottles to the appointment     ARRIVE EARLY FOR ALL VISITS:  Please note that we are unable to accommodate late arrivals as per office policy.  YOU-the patient - (NOT a parent, spouse, or friend) must be physically present at check-in no later than 12 minutes after the scheduled appointment time, or you will be asked to reschedule   Consider scheduling a virtual appointment with Dr. Martin through [facility] as an alternative if transportation to the clinic is difficult or unavailable   Please note, however, that virtual visits can only be scheduled after being an established patient of Dr. Martin. All new appointments  "must be done in-person in clinic  Some insurances will not cover the cost of virtual appointments. Please check with your insurance to find out if these visits are covered    COMMUNICATING URGENT AND NON-URGENT MATTERS:  Your concerns are important and deserve to be heard and addressed. If you have an urgent matter, there are two methods that will ensure your concerns are prioritized appropriately:   Preferred method: Sign-up/Login to your [employer] account and send a message addressed to Dr. Martin or Angela Johnson (Dr. Martin's assistant). In the subject line, type \"urgent\" followed by a word or phrase describing the situation (For example, write \"Urgent: Out of antiseuzre med and need refill\" or \"Urgent: Severe side effects to new meds\". In doing this, our staff can ensure urgent messages are triaged appropriately and communicated to Dr. Martin that day.  Call [facility] main line at [phone]. Dr. Martin's voicemail extension is [phone]. When leaving a voice message, specifically indicate if it is urgent (or non-urgent) so that the matter can be triaged appropriately and addressed in a timely manner    Thank you for entrusting your neurological care to [facility] and we look forward to continuing to serve you.   "

## 2024-09-05 NOTE — PROGRESS NOTES
NEUROLOGY FOLLOW-UP - 09/05/2024     REASON FOR VISIT: Carl Blount 79 y.o. male presents today for follow-up       SUMMARY RELEVANT PAST MEDICAL HISTORY AND/OR COMPENDIUM OF RELEVANT WORK-UP AND TREATMENTS TO DATE:  sEE PRIOR NOTES    INTERVAL HISTORY:  Patient returns with his wife. He had been doing very well on low dose keppra 500 mg twice per day and had not had any focal seizures on last week when he had what sounds like a brief focal aware seizure affecting language. He was unable to to speak, lasted a few minutes. No post-ictal symptoms. He has been feeling well but did have a busy weekend, more so than usual with friends. Overall keppra has helped reduce the seizures with this one exception. NO other complaints    CURRENT MEDICATIONS AT THE TIME OF THIS ENCOUNTER:  Current Outpatient Medications on File Prior to Visit   Medication Sig Dispense Refill    amoxicillin (AMOXIL) 500 MG Cap TAKE 4 CAPSULE BY MOUTH 1 HOURS BEFORE DENTAL APPOINTMENT      LORazepam (ATIVAN) 0.5 MG Tab TAKE 1/2 TABLET BY MOUTH EVERY DAY AS NEEDED      sacubitril-valsartan (ENTRESTO) 24-26 MG Tab TAKE 1 TABLET BY MOUTH TWICE A DAY FOR HEART FAILURE --STOP LISINOPRIL 36 HOURS OR MORE BEFORE STARTING THIS MEDICATION      Empagliflozin (JARDIANCE) 25 MG Tab Take 12.5 mg by mouth every day.      metoprolol SR (TOPROL XL) 25 MG TABLET SR 24 HR Take 25 mg by mouth every day.      spironolactone (ALDACTONE) 25 MG Tab Take 1 Tablet by mouth every day. 90 Tablet 3    Mirabegron ER 50 MG TABLET SR 24 HR Take 50 mg by mouth every day.      diclofenac sodium (VOLTAREN) 1 % Gel Apply 4 g topically 4 times a day as needed. Apply to back   Indications: Joint Damage causing Pain and Loss of Function      vitamin D3 (CHOLECALCIFEROL) 1000 Unit (25 mcg) Tab Take 1,000 Units by mouth every day.      aspirin 81 MG EC tablet Take 81 mg by mouth every day.      atorvastatin (LIPITOR) 40 MG Tab Take 1 Tablet by mouth every evening. 100 Tablet 3     "apixaban (ELIQUIS) 5mg Tab Take 1 Tablet by mouth 2 times a day. 180 Tablet 1    finasteride (PROSCAR) 5 MG Tab Take 5 mg by mouth every day.      loratadine (CLARITIN) 10 MG Tab Take 10 mg by mouth every day.      lidocaine (LIDODERM) 5 % Patch Place 1 Patch on the skin 1 time a day as needed (Pain).      traZODone (DESYREL) 100 MG Tab Take 100 mg by mouth every evening.      therapeutic multivitamin-minerals (THERAGRAN-M) Tab Take 1 tablet by mouth every day.      cyanocobalamin (VITAMIN B12) 1000 MCG Tab Take 1,000 mcg by mouth every day.      Omega-3 Fatty Acids (FISH OIL) 1000 MG Cap capsule Take 1,000 mg by mouth every day.      cyclobenzaprine (FLEXERIL) 10 MG Tab Take 10 mg by mouth 3 times a day as needed for Muscle Spasms.      pantoprazole (PROTONIX) 40 MG Tablet Delayed Response Take 40 mg by mouth every day.      tamsulosin (FLOMAX) 0.4 MG capsule Take 0.4 mg by mouth 2 times a day.      hydroxychloroquine (PLAQUENIL) 200 MG Tab Take 200 mg by mouth 2 times a day.       No current facility-administered medications on file prior to visit.          EXAM:   /78 (BP Location: Right arm, Patient Position: Sitting, BP Cuff Size: Adult)   Pulse (!) 51   Temp 36.1 °C (97 °F) (Temporal)   Resp 16   Ht 1.854 m (6' 1\")   Wt 107 kg (235 lb 3.7 oz)   SpO2 98%    Wt Readings from Last 5 Encounters:   09/05/24 107 kg (235 lb 3.7 oz)   07/16/24 102 kg (225 lb)   06/24/24 102 kg (225 lb)   04/08/24 103 kg (226 lb)   04/03/24 105 kg (232 lb 5.8 oz)      Physical Exam:  Physical Exam  Constitutional:       General: He is awake.   Eyes:      Extraocular Movements: EOM normal. No nystagmus.   Neurological:      Mental Status: He is alert.        Neurological Exam   Neurological Exam  Mental Status  Awake and alert. Recalls 1 of 3 objects immediately. At 3 minutes recalls 3 of 3 objects. Fund of knowledge is appropriate for level of education.  Mild expressive aphasia noted. Word-finding difficulty. Decreased " fluency. Only able to say 5 words that begin with letter F in 1 minute. No word substituions. Language percewption intact. Able to follow complex multistep commands.    Cranial Nerves  CN II: Right normal visual field. Left normal visual field.  CN III, IV, VI: Extraocular movements intact bilaterally. No nystagmus. Normal saccades. Diminished smooth pursuit.   Right pupil: 3 mm. Round. Abnormal reactivity:   Left pupil: 3 mm. Round. Abnormal reactivity:  CN V: Facial sensation is normal.  CN VII: Full and symmetric facial movement.  CN XII: Tongue midline without atrophy or fasciculations.    Motor   Right pronator drift.  Mild slowness to finger tapping on right. Clumsiness, ataxic with left fingers. Mild diffuse weakness of LE.    Sensory  Light touch is normal in upper and lower extremities.  No right-sided hemispatial neglect. No left-sided hemispatial neglect. Right extinction absent: Left extinction absent:    Coordination  Right: Finger-to-nose normal. Rapid alternating movement abnormality:Left: Finger-to-nose abnormality: Rapid alternating movement abnormality:    Gait  Casual gait: Narrow stance. Reduced stride length. Hesitant gait.         ASSESSMENT, EDUCATION, AND COUNSELING:  This is a 79 y.o. male patient who presents to the neurology clinic. We had an extensive discussion about the patient's symptoms, signs, and work-up to date, if any. We discussed potential and/or definitive diagnoses, work-up, and potential treatments.     PLAN:  If applicable, the work-up such as labs, imaging, procedures, and/or other testing, referrals, and/or recommended treatment strategies are listed below.    Medications were administered today in clinic (if any):     Visit Diagnoses     ICD-10-CM   1. 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 Gait instability  R26.81   3. History of stroke   Z86.73   4. Carotid stenosis, right  I65.21   5. Ischemic cardiomyopathy  I25.5   6. Paroxysmal atrial fibrillation (HCC)  I48.0   7. Transient neurological symptoms  R29.818      Orders Placed This Encounter    levetiracetam (KEPPRA) 750 MG tablet        Patient with probable focal seizure affecting language as detailed above. Increase keppra to 750 mg twice/day. [facility] Side effects discussed. Close follow-up in 3 months        BILLING DOCUMENTATION:     The number of minutes of face-to-face time spent in this encounter was I spent a total of 20 minutes on the day of the visit.  . Over 50% of the time of the visit today was spent on counseling and/or coordination of care wtih the patient and/or family, as outlined above in assessment in plan.    Charles Martin MD  Department of Neurology at [facility]  Diplomate of the American Board of Psychiatry and Neurology, General Neurology  Diplomate of American Board of Psychiatry and Neurology, a Member Board of the American Board of Medical Subspecialties, Epilepsy  Director of [facility]'s Level III Comprehensive Epilepsy Program  Professor of Clinical Neurology, [facility].   [address] [facility], [address]  [facility] [id number] [phone]   Fax: [phone]  E-mail: [email or web address]

## 2024-09-10 ENCOUNTER — PATIENT MESSAGE (OUTPATIENT)
Dept: NEUROLOGY | Facility: MEDICAL CENTER | Age: 79
End: 2024-09-10
Payer: MEDICARE

## 2024-09-10 DIAGNOSIS — G40.101: ICD-10-CM

## 2024-09-11 ENCOUNTER — TELEPHONE (OUTPATIENT)
Dept: NEUROLOGY | Facility: MEDICAL CENTER | Age: 79
End: 2024-09-11
Payer: MEDICARE

## 2024-09-11 RX ORDER — LEVETIRACETAM 750 MG/1
750 TABLET ORAL 2 TIMES DAILY
Qty: 180 TABLET | Refills: 3 | Status: SHIPPED | OUTPATIENT
Start: 2024-09-11 | End: 2025-09-06

## 2024-10-29 ENCOUNTER — OFFICE VISIT (OUTPATIENT)
Dept: CARDIOLOGY | Facility: MEDICAL CENTER | Age: 79
End: 2024-10-29
Attending: INTERNAL MEDICINE
Payer: MEDICARE

## 2024-10-29 VITALS
OXYGEN SATURATION: 94 % | SYSTOLIC BLOOD PRESSURE: 104 MMHG | WEIGHT: 235 LBS | HEIGHT: 73 IN | HEART RATE: 60 BPM | DIASTOLIC BLOOD PRESSURE: 70 MMHG | BODY MASS INDEX: 31.14 KG/M2

## 2024-10-29 DIAGNOSIS — I25.10 CORONARY ARTERY DISEASE INVOLVING NATIVE HEART WITHOUT ANGINA PECTORIS, UNSPECIFIED VESSEL OR LESION TYPE: ICD-10-CM

## 2024-10-29 DIAGNOSIS — I48.0 PAROXYSMAL ATRIAL FIBRILLATION (HCC): ICD-10-CM

## 2024-10-29 DIAGNOSIS — E78.2 MIXED HYPERLIPIDEMIA: ICD-10-CM

## 2024-10-29 PROCEDURE — 99213 OFFICE O/P EST LOW 20 MIN: CPT | Performed by: INTERNAL MEDICINE

## 2024-10-29 ASSESSMENT — FIBROSIS 4 INDEX: FIB4 SCORE: 1.55

## 2024-11-11 NOTE — ED NOTES
Floor notified pt ready for transport.   
Med rec updated and complete. Allergies reviewed. Met with pt at bedside. Pt had a dental procedure approximately 3 weeks ago and took a one time loading dose of AMOXICILLIN  Prior to the procedure.        Last Rivaroxaban dose 08/01/21      Home pharmacy  [phone]          Current Outpatient Medications on File Prior to Encounter   Medication Sig Dispense Refill   • zolpidem (AMBIEN) 10 MG Tab Take 10 mg by mouth at bedtime as needed for Sleep.     • hydroxychloroquine (PLAQUENIL) 200 MG Tab Take 200 mg by mouth every day.     • therapeutic multivitamin-minerals (THERAGRAN-M) Tab Take 1 tablet by mouth every day.     • cyanocobalamin (VITAMIN B12) 1000 MCG Tab Take 1,000 mcg by mouth every day.     • Omega-3 Fatty Acids (FISH OIL) 1000 MG Cap capsule Take 1,000 mg by mouth every day.     • atorvastatin (LIPITOR) 80 MG tablet Take 1 tablet by mouth at bedtime. 90 tablet 3   • aspirin (ASA) 81 MG Chew Tab chewable tablet Chew 81 mg at bedtime. 100 tablet    • metoprolol SR (TOPROL XL) 25 MG TABLET SR 24 HR Take 1 tablet by mouth every day. 90 tablet 3   • cetirizine (ZYRTEC) 10 MG Tab Take 10 mg by mouth every day.     • cyclobenzaprine (FLEXERIL) 10 MG Tab Take 10 mg by mouth 3 times a day as needed for Muscle Spasms.     • hydroCHLOROthiazide (HYDRODIURIL) 25 MG Tab Take 25 mg by mouth every day.     • lisinopril (PRINIVIL) 20 MG Tab Take 20 mg by mouth every day.     • pantoprazole (PROTONIX) 40 MG Tablet Delayed Response Take 40 mg by mouth every day.     • rivaroxaban (XARELTO) 20 MG Tab tablet Take 20 mg by mouth with dinner.     • tamsulosin (FLOMAX) 0.4 MG capsule Take 0.4 mg by mouth at bedtime.                                    
Pt transported to inpatient room by floor RN and tech on cardiac monitor on thomas. Pt left ED a/o x 4 GCS 15.   
Pt up to bedside to use urinal. Pt steady on feet.   
Report from Radha DEAN.  Pt in red 12, ERP and neurology at bedside.  Pt a&ox4 at this time, speaking in clear sentences. EKG complete. Family at bedside.  
X-ray at bedside.  
I have personally seen and examined the patient. I have collaborated with and supervised the

## 2024-12-19 ENCOUNTER — OFFICE VISIT (OUTPATIENT)
Dept: NEUROLOGY | Facility: MEDICAL CENTER | Age: 79
End: 2024-12-19
Attending: STUDENT IN AN ORGANIZED HEALTH CARE EDUCATION/TRAINING PROGRAM
Payer: MEDICARE

## 2024-12-19 VITALS
RESPIRATION RATE: 12 BRPM | BODY MASS INDEX: 31.12 KG/M2 | DIASTOLIC BLOOD PRESSURE: 62 MMHG | SYSTOLIC BLOOD PRESSURE: 114 MMHG | OXYGEN SATURATION: 98 % | TEMPERATURE: 96.8 F | HEIGHT: 73 IN | HEART RATE: 68 BPM | WEIGHT: 234.79 LBS

## 2024-12-19 DIAGNOSIS — F41.9 ANXIETY: ICD-10-CM

## 2024-12-19 DIAGNOSIS — Z91.81 RISK FOR FALLS: ICD-10-CM

## 2024-12-19 DIAGNOSIS — Z98.890 HISTORY OF CAROTID ENDARTERECTOMY: ICD-10-CM

## 2024-12-19 DIAGNOSIS — R56.9 SEIZURES (HCC): ICD-10-CM

## 2024-12-19 DIAGNOSIS — G40.101: ICD-10-CM

## 2024-12-19 DIAGNOSIS — F41.9 CHRONIC ANXIETY: ICD-10-CM

## 2024-12-19 DIAGNOSIS — R26.81 GAIT INSTABILITY: ICD-10-CM

## 2024-12-19 DIAGNOSIS — R29.818 TRANSIENT NEUROLOGICAL SYMPTOMS: ICD-10-CM

## 2024-12-19 DIAGNOSIS — Z86.73 HISTORY OF STROKE: ICD-10-CM

## 2024-12-19 DIAGNOSIS — R40.4 TRANSIENT ALTERATION OF AWARENESS: ICD-10-CM

## 2024-12-19 DIAGNOSIS — I65.21 CAROTID STENOSIS, RIGHT: ICD-10-CM

## 2024-12-19 DIAGNOSIS — I25.5 ISCHEMIC CARDIOMYOPATHY: ICD-10-CM

## 2024-12-19 DIAGNOSIS — R47.89 WORD FINDING DIFFICULTY: ICD-10-CM

## 2024-12-19 DIAGNOSIS — I48.0 PAROXYSMAL ATRIAL FIBRILLATION (HCC): ICD-10-CM

## 2024-12-19 PROCEDURE — 3078F DIAST BP <80 MM HG: CPT | Performed by: STUDENT IN AN ORGANIZED HEALTH CARE EDUCATION/TRAINING PROGRAM

## 2024-12-19 PROCEDURE — 99213 OFFICE O/P EST LOW 20 MIN: CPT | Performed by: STUDENT IN AN ORGANIZED HEALTH CARE EDUCATION/TRAINING PROGRAM

## 2024-12-19 PROCEDURE — 3074F SYST BP LT 130 MM HG: CPT | Performed by: STUDENT IN AN ORGANIZED HEALTH CARE EDUCATION/TRAINING PROGRAM

## 2024-12-19 PROCEDURE — 99212 OFFICE O/P EST SF 10 MIN: CPT | Performed by: STUDENT IN AN ORGANIZED HEALTH CARE EDUCATION/TRAINING PROGRAM

## 2024-12-19 RX ORDER — LORAZEPAM 0.5 MG/1
TABLET ORAL
Qty: 30 TABLET | Refills: 0 | Status: SHIPPED | OUTPATIENT
Start: 2024-12-19 | End: 2025-03-19

## 2024-12-19 ASSESSMENT — PATIENT HEALTH QUESTIONNAIRE - PHQ9: CLINICAL INTERPRETATION OF PHQ2 SCORE: 0

## 2024-12-19 ASSESSMENT — FIBROSIS 4 INDEX: FIB4 SCORE: 1.55

## 2024-12-19 NOTE — PROGRESS NOTES
NEUROLOGY FOLLOW-UP - 12/19/2024     REASON FOR VISIT: Carl Blount 79 y.o. 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 He was unable to to speak, lasted a few minutes. No post-ictal symptoms. He has been feeling well but did have a busy weekend, ) so I recommended that we increase keppra to 750 mg BID. He also take ativan 1/2 tab of 0.5 mg as needed for anxiety    Reports he had fallen last month while in [place]. He did hit his head. Denies any symptoms of headache, double vision, sensorimotor changes, change in mood or personality. Wife here today to corroborate.    Takes vitamin D 5000 IU /day    CURRENT MEDICATIONS:  Current Outpatient Medications on File Prior to Visit   Medication Sig Dispense Refill    levetiracetam (KEPPRA) 750 MG tablet Take 1 Tablet by mouth 2 times a day for 360 days. 180 Tablet 3    sacubitril-valsartan (ENTRESTO) 24-26 MG Tab TAKE 1 TABLET BY MOUTH TWICE A DAY FOR HEART FAILURE --STOP LISINOPRIL 36 HOURS OR MORE BEFORE STARTING THIS MEDICATION      Empagliflozin (JARDIANCE) 25 MG Tab Take 12.5 mg by mouth every day.      metoprolol SR (TOPROL XL) 25 MG TABLET SR 24 HR Take 25 mg by mouth every day.      spironolactone (ALDACTONE) 25 MG Tab Take 1 Tablet by mouth every day. 90 Tablet 3    Mirabegron ER 50 MG TABLET SR 24 HR Take 50 mg by mouth every day.      diclofenac sodium (VOLTAREN) 1 % Gel Apply 4 g topically 4 times a day as needed. Apply to back   Indications: Joint Damage causing Pain and Loss of Function      vitamin D3 (CHOLECALCIFEROL) 1000 Unit (25 mcg) Tab Take 1,000 Units by mouth every day.      aspirin 81 MG EC tablet Take 81 mg by mouth every day.      atorvastatin (LIPITOR)  "40 MG Tab Take 1 Tablet by mouth every evening. 100 Tablet 3    apixaban (ELIQUIS) 5mg Tab Take 1 Tablet by mouth 2 times a day. 180 Tablet 1    finasteride (PROSCAR) 5 MG Tab Take 5 mg by mouth every day.      loratadine (CLARITIN) 10 MG Tab Take 10 mg by mouth every day.      lidocaine (LIDODERM) 5 % Patch Place 1 Patch on the skin 1 time a day as needed (Pain).      traZODone (DESYREL) 100 MG Tab Take 100 mg by mouth every evening.      therapeutic multivitamin-minerals (THERAGRAN-M) Tab Take 1 tablet by mouth every day.      cyanocobalamin (VITAMIN B12) 1000 MCG Tab Take 1,000 mcg by mouth every day.      Omega-3 Fatty Acids (FISH OIL) 1000 MG Cap capsule Take 1,000 mg by mouth every day.      cyclobenzaprine (FLEXERIL) 10 MG Tab Take 10 mg by mouth 3 times a day as needed for Muscle Spasms.      pantoprazole (PROTONIX) 40 MG Tablet Delayed Response Take 40 mg by mouth every day.      tamsulosin (FLOMAX) 0.4 MG capsule Take 0.4 mg by mouth 2 times a day.       No current facility-administered medications on file prior to visit.        EXAM:   /62 (BP Location: Left arm, Patient Position: Sitting, BP Cuff Size: Adult)   Pulse 68   Temp 36 °C (96.8 °F) (Temporal)   Resp 12   Ht 1.854 m (6' 1\")   Wt 107 kg (234 lb 12.6 oz)   SpO2 98%    Wt Readings from Last 5 Encounters:   12/19/24 107 kg (234 lb 12.6 oz)   10/29/24 107 kg (235 lb)   09/05/24 107 kg (235 lb 3.7 oz)   07/16/24 102 kg (225 lb)   06/24/24 102 kg (225 lb)      Physical Exam:  Physical Exam  Constitutional:       General: He is awake.   Eyes:      Extraocular Movements: EOM normal. No nystagmus.   Neurological:      Mental Status: He is alert.        Neurological Exam   Neurological Exam  Mental Status  Awake and alert. Recalls 1 of 3 objects immediately. At 3 minutes recalls 3 of 3 objects. Fund of knowledge is appropriate for level of education.  Mild expressive aphasia noted. Word-finding difficulty. Decreased fluency. Only able to say 5 " words that begin with letter F in 1 minute. No word substituions. Language percewption intact. Able to follow complex multistep commands.    Cranial Nerves  CN II: Right normal visual field. Left normal visual field.  CN III, IV, VI: Extraocular movements intact bilaterally. No nystagmus. Normal saccades. Diminished smooth pursuit.   Right pupil: 3 mm. Round. Abnormal reactivity:   Left pupil: 3 mm. Round. Abnormal reactivity:  CN V: Facial sensation is normal.  CN VII: Full and symmetric facial movement.  CN XII: Tongue midline without atrophy or fasciculations.    Motor   Right pronator drift.  Mild slowness to finger tapping on right. Clumsiness, ataxic with left fingers. Mild diffuse weakness of LE.    Sensory  Light touch is normal in upper and lower extremities.  No right-sided hemispatial neglect. No left-sided hemispatial neglect. Right extinction absent: Left extinction absent:    Coordination  Right: Finger-to-nose normal. Rapid alternating movement abnormality:Left: Finger-to-nose abnormality: Rapid alternating movement abnormality:    Gait  Casual gait: Narrow stance. Unable to rise from chair without using arms.  Walks with a gonzalez. Slightly unsteady on his feet but is cautious.       ASSESSMENT, EDUCATION, AND COUNSELING:  This is a 79 y.o. male patient who presents to the neurology clinic. We had an extensive discussion about the patient's symptoms, signs, and work-up to date, if any. We discussed potential and/or definitive diagnoses, work-up, and potential treatments.     PLAN:  Medications administered in today's encounter if applicable:       If applicable, the work-up such as labs, imaging, procedures, and/or other testing, referrals, and/or recommended treatment strategies are listed below.  Orders Placed This Encounter    Patient identified as fall risk.  Appropriate orders and counseling given.    LORazepam (ATIVAN) 0.5 MG Tab     Lab Frequency Next Occurrence   US-CAROTID DOPPLER BILAT Once  07/16/2025         Medication List            Accurate as of December 19, 2024 11:07 AM. If you have any questions, ask your nurse or doctor.                CHANGE how you take these medications        Instructions   LORazepam 0.5 MG Tabs  What changed: See the new instructions.  Commonly known as: Ativan  Changed by: Dr. Charles Martin   Doctor's comments: Request 30 tabs of 0.5 mg to 30 days  Take 1/2 to 1 tab as needed for anxiety or seizures            CONTINUE taking these medications        Instructions   apixaban 5mg Tabs  Commonly known as: Eliquis   Doctor's comments: This Rx has been ordered by a pharmacist working under a collaborative practice agreement.  Take 1 Tablet by mouth 2 times a day.  Dose: 5 mg     aspirin 81 MG EC tablet   Take 81 mg by mouth every day.  Dose: 81 mg     atorvastatin 40 MG Tabs  Commonly known as: Lipitor   Take 1 Tablet by mouth every evening.  Dose: 40 mg     cyanocobalamin 1000 MCG Tabs  Commonly known as: Vitamin B12   Take 1,000 mcg by mouth every day.  Dose: 1,000 mcg     cyclobenzaprine 10 mg Tabs  Commonly known as: Flexeril   Take 10 mg by mouth 3 times a day as needed for Muscle Spasms.  Dose: 10 mg     diclofenac sodium 1 % Gel  Commonly known as: Voltaren   Apply 4 g topically 4 times a day as needed. Apply to back   Indications: Joint Damage causing Pain and Loss of Function  Dose: 4 g     finasteride 5 MG Tabs  Commonly known as: Proscar   Take 5 mg by mouth every day.  Dose: 5 mg     fish oil 1000 MG Caps capsule   Take 1,000 mg by mouth every day.  Dose: 1,000 mg     Jardiance 25 MG Tabs  Generic drug: Empagliflozin   Take 12.5 mg by mouth every day.  Dose: 12.5 mg     levetiracetam 750 MG tablet  Commonly known as: Keppra   Take 1 Tablet by mouth 2 times a day for 360 days.  Dose: 750 mg     lidocaine 5 % Ptch  Commonly known as: Lidoderm   Place 1 Patch on the skin 1 time a day as needed (Pain).  Dose: 1 Patch     loratadine 10 MG Tabs  Commonly known as:  Claritin   Take 10 mg by mouth every day.  Dose: 10 mg     metoprolol SR 25 MG Tb24  Commonly known as: Toprol XL   Take 25 mg by mouth every day.  Dose: 25 mg     Mirabegron ER 50 MG Tb24   Take 50 mg by mouth every day.  Dose: 50 mg     pantoprazole 40 MG Tbec  Commonly known as: Protonix   Take 40 mg by mouth every day.  Dose: 40 mg     sacubitril-valsartan 24-26 MG Tabs  Commonly known as: Entresto   TAKE 1 TABLET BY MOUTH TWICE A DAY FOR HEART FAILURE --STOP LISINOPRIL 36 HOURS OR MORE BEFORE STARTING THIS MEDICATION     spironolactone 25 MG Tabs  Commonly known as: Aldactone   Take 1 Tablet by mouth every day.  Dose: 25 mg     tamsulosin 0.4 MG capsule  Commonly known as: Flomax   Take 0.4 mg by mouth 2 times a day.  Dose: 0.4 mg     therapeutic multivitamin-minerals Tabs   Take 1 tablet by mouth every day.  Dose: 1 Tablet     traZODone 100 MG Tabs  Commonly known as: Desyrel   Take 100 mg by mouth every evening.  Dose: 100 mg     vitamin D3 1000 Unit (25 mcg) Tabs  Commonly known as: Cholecalciferol   Take 1,000 Units by mouth every day.  Dose: 1,000 Units            STOP taking these medications      amoxicillin 500 MG Caps  Commonly known as: Amoxil  Stopped by: Dr. Charles Martin               Patient with probable focal seizure affecting language as detailed above. Recommend he continue keppra at 750 mg twice per day for now. Refills sent back in September so no refills were needed today. Refilled ativan to be used as needed for seizures, anxiety.    He should continue vitamin D 5000 IU/day,    Follow-up in 6 months, sooner if needed, at which time we will obtain updated blood work/      BILLING DOCUMENTATION:     The number of minutes of face-to-face time spent in this encounter was I spent a total of 20 minutes on the day of the visit.  . Over 50% of the time of the visit today was spent on counseling and/or coordination of care wtih the patient and/or family, as outlined above in assessment in  plan.    Charles Martin MD  Department of Neurology at [facility]  Diplomate of the American Board of Psychiatry and Neurology, General Neurology  Diplomate of American Board of Psychiatry and Neurology, a Member Board of the American Board of Medical Subspecialties, Epilepsy  Director of [facility] Level III Comprehensive Epilepsy Program  Professor of Clinical Neurology, [facility].   [address], [address]  [facility] [id number] [phone]   Fax: [phone]  E-mail: [email or web address]

## 2024-12-19 NOTE — PATIENT INSTRUCTIONS
NEUROLOGY CLINIC VISIT WITH DR. MARTIN     PLEASE READ THIS ENTIRE DOCUMENT CAREFULLY AND COMPLETELY:    First and foremost, you matter to Dr. Martin and you deserve the best care.   Dr. Martin prides himself on providing the best possible care to all his patients. He strives to make each appointment meaningful, so that all your concerns are being addressed and all your neurological problems are being optimally treated. In order to achieve these goals for everyone, Dr. Martin has listed important reminders and the best ways to prepare for each appointment. Please read each item carefully. Thank you!    Due to the high volume of patients we are trying to help, your physician will not be able to respond by phone or in [employer] to your routine concerns between appointments.  This does not reflect a lack of interest or concern for you or your diagnosis.  Please bring these questions and concerns to your appointment where your physician can answer.  Please relay more pressing concerns to our office, either via [employer], or by phone; if not able to reach us please visit nearby Urgent Care Center or Emergency Department.  If any emergent medical needs, please seek emergent medical help and/or call 911.    Also, please note that we are not able to fill out paperwork that might be related to your work, utility company, disability, and/or driving, among others, in between the visits.  Please schedule a dedicated appointment to address any and all paperwork.  This is not due to lack of concern or interest for your disease-related work/administrative problems, but to make sure that we provide the best possible care and to fill out your paperwork in a correct, complete, and timely manner.  ------------------------------------------------------------------------------------------  Please let our office know if you have any changes in your seizure frequency and/characteristics.     Please keep a diary of your seizures and bring it  with you to each appointment.    Please take vitamin D3 6540-7886 internation units daily.     Please abstain from driving until further notice    If you are a biological female with epilepsy who is of reproductive age, who is actively breastfeeding, and/or who infants/young children:  Please take folic acid 1 mg daily. This is an over-the-counter supplement that is recommended to prevent certain developmental problems in your baby, in case you become pregnant in the future.  It is critical that you let our office know as soon as you become pregnant or plan to become pregnant.  If you are caring for a baby/young child, please make sure to be sitting on a soft surface while holding your baby/young child, so in case you have a seizure, your baby/young child is not injured due to fall.   Please let us know if, while breastfeeding, you observe that your baby is excessively sleepy and/or has other behavioral changes. Because many antiseizure medications are collected in breast milk, some nursing babies can suffer adverse medication effects.    Please note that the following might precipitate seizures:   missed doses of antiseizure medications  being sick with a fever, stress  Fatigue  sleep deprivation or abnormal sleeping patterns  not eating regularly  not drinking enough water  drinking too much alcohol  stopping alcohol suddenly if you are currently using it on a regular/daily basis,   using recreational drugs, among others.    Please note that the following might lead to an injury or even be life-threatening in the event you have a seizure and/or lose awareness while:  being in a large body of water by yourself, such as bath, pool, lake, ocean, among others (risk of drowning)  being on unprotected heights (risk of fall)  being around and/or operating heavy machinery (risk of injury)  being around open fire/hot surfaces (risk of burns)  any other activities/circumstances, in which if you lose awareness, you might  injure yourself and/or others.  -------------------------------------------------------------------------------------------  SUDEP (SUDDEN UNEXPECTED DEATH IN EPILEPSY)  It is important that your seizures are well controlled and you have none or have them rarely. In addition to avoiding injury related to breakthrough seizures, frequent seizures increase risk of SUDEP (sudden unexpected death in epilepsy), where a person goes into a seizure and then never wakes up. The best way to prevent SUDEP is to control your seizures well.   ------------------------------------------------------------------------------------------  Please call for help (crisis line and/or 911) in case you have thoughts of harming yourself and/or others.  ------------------------------------------------------------------------------------------  INSTRUCTIONS FOR YOUR FAMILY/CAREGIVERS:  Please call 911 if the patient has a seizure longer than 2-3 minutes, if seizures are back to back without her recovering to her baseline, or she does not start recovering within 5-10 minutes after the seizure stops. During the seizure - please turn her on her side, please make sure her head is protected (for example, you should put a pillow under her head, if one is available), and please do not put anything in her mouth.   ------------------------------------------------------------------------------------------  PATIENT EXPECTATIONS,  IMPORTANT APPOINTMENT REMINDERS, AND ADDITIONAL HELPFUL TIPS:   REFILLS:   Request refills AT LEAST 1 week in advance to ensure you do not run out of medications    MyChart  It is STRONGLY encouraged that ALL patients sign up for MyChart. It is BY FAR the fastest and most convenient way for both Dr. Martin and patients to obtain timely refills.  If you are having trouble signing up or logging into your account, staff are available to help you. Please ask a medical assistant or staff at the  to assist you.    TEST RESULS:    All labs and diagnostic test results will be reviewed at your next visit, UNLESS  Dr. Martin determines that there are important findings on the tests need to be acted on sooner. Dr. Martin will either call or send a message through [employer] if this is the case.    BE PREPARED PRIOR TO EVERY APPOINTMENT:  All patient are responsible for ensuring that ALL test results that were completed outside of the [facility] system have been received by our Neurology Department PRIOR to your appointment with Dr. Martin.    IMPORTANT:  ALL images (not just the reports) must be sent and uploaded to the [facility] system. Dr. Martin reviews all images personally prior to each visit. Ensuring that ALL the test results and test images are accessible to Dr. Martin prior to your appointment is YOUR responsibility and an important part of making the most out of each appointment.   Bring a government-issues picture ID and an updated insurance card EVERY visit.  It is highly recommended that you bring at every visit a list of the most important topics that you want address. While it may not be possible to address all items on the list in a single visit, preparing a list will ensure that Dr. Martin addresses the items that are most important to you and your health    PAPERWORK, DOCUMENTATION, LETTER REQUESTS:  You must notify the office ahead of your appointment of all paperwork or letter requests.   Please DO NOT wait until the last minute to make these requests. Please give all paperwork to the medical assistant at the start of the appointment and check-in process. Please note that Dr. Martin may not be able complete some types of documentation in a single appointment or even within a single day or week. This is why it is important to communicate paperwork requests prior to your appointment and at least 2 weeks prior to any deadlines.    KNOW ALL YOU MEDICATIONS:   AT EVERY SINGLE APPOINTMENT, please bring a list of every single prescribed,  non-prescribed, and over the counter medication or supplements you are taking, including ones taken on a rare or intermittent basis.  Include the following information for each prescribed or non-prescribed medications:  Name of medication   The strength of EACH pill/capsule/tablet, etc.   The number of pills/capsules/tablets, etc taken per dose  The number and time of day that doses are taken  For every single Supplement that you take on a routine or intermittent basis, you must include:  The Brand Name   A complete list of every single ingredient, compound, vitamin, and/or mineral in each dose, along with the corresponding amounts/strengths of all ingredients, vitamins, minerals, etc., if such information is provided or known  The number of doses taken per day and time of day doses are taken  If medications are taken on an intermittent or as needed basis, please estimate how many days per week or days per month the medications are used  DO NOT just print out your medication list from [employer] [facility] or bring a list from a prior appointment or hospitalizations because the information is often often unreliable, inaccurate, outdated, and/or incomplete   The list should be printed or written  If you forget or do not have a list of all the medication, then it is acceptable, although less preferred, to bring all the bottles to the appointment     ARRIVE EARLY FOR ALL VISITS:  Please note that we are unable to accommodate late arrivals as per office policy.  YOU-the patient - (NOT a parent, spouse, or friend) must be physically present at check-in no later than 12 minutes after the scheduled appointment time, or you will be asked to reschedule   Consider scheduling a virtual appointment with Dr. Martin through [employer] [facility] as an alternative if transportation to the clinic is difficult or unavailable   Please note, however, that virtual visits can only be scheduled after being an established patient of Dr. Martin. All new appointments  "must be done in-person in clinic  Some insurances will not cover the cost of virtual appointments. Please check with your insurance to find out if these visits are covered    COMMUNICATING URGENT AND NON-URGENT MATTERS:  Your concerns are important and deserve to be heard and addressed. If you have an urgent matter, there are two methods that will ensure your concerns are prioritized appropriately:   Preferred method: Sign-up/Login to your [employer] account and send a message addressed to Dr. Martin or Angela Johnson (Dr. Martin's assistant). In the subject line, type \"urgent\" followed by a word or phrase describing the situation (For example, write \"Urgent: Out of antiseuzre med and need refill\" or \"Urgent: Severe side effects to new meds\". In doing this, our staff can ensure urgent messages are triaged appropriately and communicated to Dr. Martin that day.  Call [facility] Neurology main line at [phone]. Dr. Martin's voicemail extension is [phone]. When leaving a voice message, specifically indicate if it is urgent (or non-urgent) so that the matter can be triaged appropriately and addressed in a timely manner    Thank you for entrusting your neurological care to [facility] Neurology and we look forward to continuing to serve you.   "

## 2025-01-11 DIAGNOSIS — R56.9 SEIZURES (HCC): ICD-10-CM

## 2025-01-11 DIAGNOSIS — G40.101: ICD-10-CM

## 2025-01-11 DIAGNOSIS — F41.9 ANXIETY: ICD-10-CM

## 2025-01-13 RX ORDER — LORAZEPAM 0.5 MG/1
TABLET ORAL
Qty: 30 TABLET | Refills: 0 | Status: SHIPPED | OUTPATIENT
Start: 2025-01-13 | End: 2025-04-11

## 2025-01-13 NOTE — TELEPHONE ENCOUNTER
Patient comment: Dr WEST left the Lorazepam in [place] could you order me another 30. Sorry

## 2025-03-18 DIAGNOSIS — R56.9 SEIZURES (HCC): ICD-10-CM

## 2025-03-18 DIAGNOSIS — G40.101: ICD-10-CM

## 2025-03-18 DIAGNOSIS — F41.9 ANXIETY: ICD-10-CM

## 2025-03-18 NOTE — TELEPHONE ENCOUNTER
Received request via: Pharmacy    Medication Name/Dosage lorazepam 0.5 MG    When was medication last prescribed 01/13/2025    How many refills were previously provided 0    How many Refills does he patient have left from last prescription 0    Was the patient seen in the last year in this department? Yes   Date of last office visit 12/19/2024     Per last Neurology Office Visit, when was the date of next follow up visit set for?                          6 months  Date of office visit follow up request 06/19/2025     Does the patient have an upcoming appointment? Yes   If yes, when 06/19/2025             If no, schedule appointment 0    Does the patient have custodial Plus and need 100 day supply (blood pressure, diabetes and cholesterol meds only)? Patient does not have SCP

## 2025-03-19 RX ORDER — LORAZEPAM 0.5 MG/1
TABLET ORAL
Qty: 30 TABLET | Refills: 0 | Status: SHIPPED | OUTPATIENT
Start: 2025-03-19 | End: 2025-04-18

## 2025-04-09 NOTE — PROGRESS NOTES
Cardiology Follow-up Consultation Note    Date of note:    4/11/2025    Primary Care Provider: Vale Brar M.D.    Patient Name: Carl Blount   YOB: 1945  MRN:              [id number]    Chief Complaint: Follow-up A-fib and CAD    History of Present Illness: Mr. Carl Blount is an 80 y.o. male whose current medical problems include HFrEF, status post TAVR, CAD status post PCI to RCA 4/2018, dyslipidemia, TIA, carotid artery disease, paroxysmal A-fib who is here for follow-up A-fib and CAD.    The patient was previously a patient of Dr. Argueta last seen 10/29/2024.  No changes were made to his medications at the last visit.  There were discussions of ICD placement with the patient saw [facility], 10/2023.  However, his LVEF thought to have increased when he last saw Dr. Argueta, and the conversation regarding ICD was deferred.    The patient presents today for follow-up.  The patient presents with his wife.  The patient reports feeling well today.  He denies any chest pain or shortness of breath on exertion although has not been exerting much.  He is planning to start exercising.  He denies any orthopnea, PND, or leg swelling.  No palpitations.  No syncope or presyncopal episodes.      Cardiovascular Risk Factors:  1. Smoking status: Former smoker  2. Type II Diabetes Mellitus: Prediabetes, diet controlled  Lab Results   Component Value Date/Time    HBA1C 5.9 (H) 04/18/2023 08:38 AM    HBA1C 5.5 06/06/2022 10:16 AM     3. Hypertension: On medication  4. Dyslipidemia: On statin  Cholesterol,Tot   Date Value Ref Range Status   04/25/2022 98 (L) 100 - 199 mg/dL Final     LDL   Date Value Ref Range Status   04/25/2022 30 <100 mg/dL Final     HDL   Date Value Ref Range Status   04/25/2022 59 >=40 mg/dL Final     Triglycerides   Date Value Ref Range Status   04/25/2022 44 0 - 149 mg/dL Final     5. Family history of early Coronary Artery Disease in a first degree relative (Male  less than 55 years of age; Female less than 65 years of age): None  6.  Obesity and/or Metabolic Syndrome: Body mass index is 30.98 kg/m².  7. Sedentary lifestyle: Not active    Review of Systems   Constitutional: Negative for fever, malaise/fatigue and night sweats.   Cardiovascular:  Negative for chest pain, dyspnea on exertion, irregular heartbeat, leg swelling, near-syncope, orthopnea, palpitations, paroxysmal nocturnal dyspnea and syncope.   Respiratory:  Negative for cough, shortness of breath and wheezing.    Gastrointestinal:  Negative for abdominal pain, diarrhea, nausea and vomiting.   Neurological:  Negative for dizziness, focal weakness and weakness.       All other systems reviewed and are negative.       Current Outpatient Medications   Medication Sig Dispense Refill    ketoconazole (NIZORAL) 2 % Cream Apply  topically.      LORazepam (ATIVAN) 0.5 MG Tab TAKE 1/2 TO 1 TABLET BY MOUTH AS NEEDED FOR ANXIETY OR SEIZURES 30 Tablet 0    levetiracetam (KEPPRA) 750 MG tablet Take 1 Tablet by mouth 2 times a day for 360 days. 180 Tablet 3    sacubitril-valsartan (ENTRESTO) 24-26 MG Tab TAKE 1 TABLET BY MOUTH TWICE A DAY FOR HEART FAILURE --STOP LISINOPRIL 36 HOURS OR MORE BEFORE STARTING THIS MEDICATION      Empagliflozin (JARDIANCE) 25 MG Tab Take 12.5 mg by mouth every day.      metoprolol SR (TOPROL XL) 25 MG TABLET SR 24 HR Take 25 mg by mouth every day.      spironolactone (ALDACTONE) 25 MG Tab Take 1 Tablet by mouth every day. 90 Tablet 3    Mirabegron ER 50 MG TABLET SR 24 HR Take 50 mg by mouth every day.      diclofenac sodium (VOLTAREN) 1 % Gel Apply 4 g topically 4 times a day as needed. Apply to back   Indications: Joint Damage causing Pain and Loss of Function      vitamin D3 (CHOLECALCIFEROL) 1000 Unit (25 mcg) Tab Take 1,000 Units by mouth every day.      aspirin 81 MG EC tablet Take 81 mg by mouth every day.      atorvastatin (LIPITOR) 40 MG Tab Take 1 Tablet by mouth every evening. 100 Tablet  "3    apixaban (ELIQUIS) 5mg Tab Take 1 Tablet by mouth 2 times a day. 180 Tablet 1    finasteride (PROSCAR) 5 MG Tab Take 5 mg by mouth every day.      loratadine (CLARITIN) 10 MG Tab Take 10 mg by mouth every day.      lidocaine (LIDODERM) 5 % Patch Place 1 Patch on the skin 1 time a day as needed (Pain).      traZODone (DESYREL) 100 MG Tab Take 100 mg by mouth every evening.      therapeutic multivitamin-minerals (THERAGRAN-M) Tab Take 1 tablet by mouth every day.      cyanocobalamin (VITAMIN B12) 1000 MCG Tab Take 1,000 mcg by mouth every day.      Omega-3 Fatty Acids (FISH OIL) 1000 MG Cap capsule Take 1,000 mg by mouth every day.      cyclobenzaprine (FLEXERIL) 10 MG Tab Take 10 mg by mouth 3 times a day as needed for Muscle Spasms.      pantoprazole (PROTONIX) 40 MG Tablet Delayed Response Take 40 mg by mouth every day.      tamsulosin (FLOMAX) 0.4 MG capsule Take 0.4 mg by mouth 2 times a day.       No current facility-administered medications for this visit.         No Known Allergies      Physical Exam:  Ambulatory Vitals  /78 (BP Location: Left arm, Patient Position: Sitting, BP Cuff Size: Adult)   Pulse 64   Resp 16   Ht 1.854 m (6' 0.99\")   Wt 107 kg (234 lb 12.8 oz)   SpO2 96%    Oxygen Therapy:  Pulse Oximetry: 96 %  BP Readings from Last 4 Encounters:   04/11/25 114/78   12/19/24 114/62   10/29/24 104/70   09/05/24 136/78       Weight/BMI: Body mass index is 30.98 kg/m².  Wt Readings from Last 4 Encounters:   04/11/25 107 kg (234 lb 12.8 oz)   12/19/24 107 kg (234 lb 12.6 oz)   10/29/24 107 kg (235 lb)   09/05/24 107 kg (235 lb 3.7 oz)         General: In no apparent distress  Eyes: nl conjunctiva, no icteric sclera  ENT: normal external appearance of ears, nose, and throat  Neck: no visible JVP,  no carotid bruits  Lungs: normal respiratory effort, CTAB  Heart: RRR, no murmurs, no rubs or gallops,  no edema bilateral lower extremities. No LV/RV heave on cardiac palpatation. + bilateral " radial pulses.  + bilateral dp pulses.   Abdomen: soft, non tender, non distended, no masses, normal bowel sounds.  No HSM.  Extremities/MSK: no clubbing, no cyanosis  Neurological: No focal sensory deficits  Psychiatric: Appropriate affect, A/O x 3, intact judgement and insight  Skin: Warm extremities      Lab Data Review:  Lab Results   Component Value Date/Time    CHOLSTRLTOT 98 (L) 04/25/2022 01:31 AM    LDL 30 04/25/2022 01:31 AM    HDL 59 04/25/2022 01:31 AM    TRIGLYCERIDE 44 04/25/2022 01:31 AM       Lab Results   Component Value Date/Time    SODIUM 138 01/04/2024 12:33 PM    POTASSIUM 4.3 01/04/2024 12:33 PM    CHLORIDE 103 01/04/2024 12:33 PM    CO2 23 01/04/2024 12:33 PM    GLUCOSE 123 (H) 01/04/2024 12:33 PM    BUN 11 01/04/2024 12:33 PM    CREATININE 0.83 01/04/2024 12:33 PM    BUNCREATRAT 15.0 07/08/2022 10:23 AM     Lab Results   Component Value Date/Time    ALKPHOSPHAT 100 (H) 01/04/2024 12:33 PM    ASTSGOT 21 01/04/2024 12:33 PM    ALTSGPT 21 01/04/2024 12:33 PM    TBILIRUBIN 0.6 01/04/2024 12:33 PM      Lab Results   Component Value Date/Time    WBC 7.5 01/04/2024 12:33 PM    HEMOGLOBIN 14.5 01/04/2024 12:33 PM     Lab Results   Component Value Date/Time    HBA1C 5.9 (H) 04/18/2023 08:38 AM    HBA1C 5.5 06/06/2022 10:16 AM         Cardiac Imaging and Procedures Review:    EKG dated 4/8/2024: My personal interpretation is Sinus rhythm  Ventricular premature complex, Left bundle branch block    Echo dated 3/22/2024:   CONCLUSIONS  Compared to the prior study on 11/02/2023.  No significant changes.  Normal left ventricular chamber size. Mild concentric left ventricular   hypertrophy. Moderately reduced left ventricular systolic function. The   left ventricular ejection fraction is visually estimated to be 35-40%.   Grade I diastolic dysfunction.    Known TAVR aortic valve that is functioning normally with normal   transvalvular gradients      Assessment & Plan     1. HFrEF (heart failure with reduced  ejection fraction) ([facility])  EC-ECHOCARDIOGRAM COMPLETE W/O CONT      2. CHF (NYHA class II, ACC/AHA stage C) ([facility])  EC-ECHOCARDIOGRAM COMPLETE W/O CONT      3. Ischemic cardiomyopathy  EC-ECHOCARDIOGRAM COMPLETE W/O CONT      4. History of transcatheter aortic valve replacement (TAVR)        5. Coronary artery disease due to lipid rich plaque        6. History of heart artery stent        7. Paroxysmal A-fib ([facility])        8. Hypercoagulable state due to atrial fibrillation, unspecified type ([facility])              Shared Medical Decision Making:    HFrEF  NYHA class II stage C heart failure  Ischemic cardiomyopathy  Status post TAVR  Euvolemic on exam.  -Continue Entresto 24-26 mg twice daily  -Continue metoprolol 25mg daily  -Continue aldactone 25mg daily  -Continue Jardiance  -As last LVEF was above 35%, will defer ICD  -Repeat echocardiogram prior to next visit    CAD  Status post PCI to RCA  Asymptomatic  -Continue aspirin and atorvastatin   -Continue metoprolol   -[facility] ([facility])  -Counseled the patient on heart healthy diet and exercise    Paroxysmal Afib  Hypercoagulable state due to AF  -Continue Eliquis 5mg twice daily  -Continue metoprolol as above    Today's visit is associated with medical care services that serve as the continuing focal point for all necessary health care services related to the patient's single, serious condition or multiple chronic complex conditions.  This includes providing services to the patient on an ongoing basis that results in care that is collaborative and personalized to the patient.  The services result in a comprehensive, longitudinal, and continuous relationship with the patient and involve delivery of team-based care that is accessible, coordinated with other practitioners and providers, and integrated with the broader health care landscape.      All of the patient's excellent questions were answered to the best of my knowledge and to his  satisfaction.  It was a pleasure seeing Mr. Carl Blount in my clinic today. Return in about 6 months (around 10/11/2025). Patient is aware to call the cardiology clinic with any questions or concerns.      Shannon Falcon MD  [facility], [facility]  [address] [address]  Phone: [phone]  Fax: [phone]

## 2025-04-11 ENCOUNTER — OFFICE VISIT (OUTPATIENT)
Dept: CARDIOLOGY | Facility: MEDICAL CENTER | Age: 80
End: 2025-04-11
Attending: STUDENT IN AN ORGANIZED HEALTH CARE EDUCATION/TRAINING PROGRAM
Payer: MEDICARE

## 2025-04-11 ENCOUNTER — TELEPHONE (OUTPATIENT)
Dept: CARDIOLOGY | Facility: MEDICAL CENTER | Age: 80
End: 2025-04-11

## 2025-04-11 VITALS
HEART RATE: 64 BPM | DIASTOLIC BLOOD PRESSURE: 78 MMHG | OXYGEN SATURATION: 96 % | RESPIRATION RATE: 16 BRPM | WEIGHT: 234.8 LBS | HEIGHT: 73 IN | SYSTOLIC BLOOD PRESSURE: 114 MMHG | BODY MASS INDEX: 31.12 KG/M2

## 2025-04-11 DIAGNOSIS — Z95.2 HISTORY OF TRANSCATHETER AORTIC VALVE REPLACEMENT (TAVR): ICD-10-CM

## 2025-04-11 DIAGNOSIS — I50.9 CHF (NYHA CLASS II, ACC/AHA STAGE C) (HCC): ICD-10-CM

## 2025-04-11 DIAGNOSIS — Z95.5 HISTORY OF HEART ARTERY STENT: ICD-10-CM

## 2025-04-11 DIAGNOSIS — I25.5 ISCHEMIC CARDIOMYOPATHY: ICD-10-CM

## 2025-04-11 DIAGNOSIS — I48.91 HYPERCOAGULABLE STATE DUE TO ATRIAL FIBRILLATION, UNSPECIFIED TYPE (HCC): ICD-10-CM

## 2025-04-11 DIAGNOSIS — I25.83 CORONARY ARTERY DISEASE DUE TO LIPID RICH PLAQUE: ICD-10-CM

## 2025-04-11 DIAGNOSIS — I48.0 PAROXYSMAL A-FIB (HCC): ICD-10-CM

## 2025-04-11 DIAGNOSIS — I50.20 HFREF (HEART FAILURE WITH REDUCED EJECTION FRACTION) (HCC): ICD-10-CM

## 2025-04-11 DIAGNOSIS — D68.69 HYPERCOAGULABLE STATE DUE TO ATRIAL FIBRILLATION, UNSPECIFIED TYPE (HCC): ICD-10-CM

## 2025-04-11 DIAGNOSIS — I25.10 CORONARY ARTERY DISEASE DUE TO LIPID RICH PLAQUE: ICD-10-CM

## 2025-04-11 PROCEDURE — 3078F DIAST BP <80 MM HG: CPT | Performed by: STUDENT IN AN ORGANIZED HEALTH CARE EDUCATION/TRAINING PROGRAM

## 2025-04-11 PROCEDURE — 99214 OFFICE O/P EST MOD 30 MIN: CPT | Performed by: STUDENT IN AN ORGANIZED HEALTH CARE EDUCATION/TRAINING PROGRAM

## 2025-04-11 PROCEDURE — G2211 COMPLEX E/M VISIT ADD ON: HCPCS | Performed by: STUDENT IN AN ORGANIZED HEALTH CARE EDUCATION/TRAINING PROGRAM

## 2025-04-11 PROCEDURE — 3074F SYST BP LT 130 MM HG: CPT | Performed by: STUDENT IN AN ORGANIZED HEALTH CARE EDUCATION/TRAINING PROGRAM

## 2025-04-11 PROCEDURE — 99213 OFFICE O/P EST LOW 20 MIN: CPT | Performed by: STUDENT IN AN ORGANIZED HEALTH CARE EDUCATION/TRAINING PROGRAM

## 2025-04-11 RX ORDER — KETOCONAZOLE 20 MG/G
CREAM TOPICAL
COMMUNITY
Start: 2024-12-28

## 2025-04-11 ASSESSMENT — ENCOUNTER SYMPTOMS
DIZZINESS: 0
PALPITATIONS: 0
WHEEZING: 0
DIARRHEA: 0
IRREGULAR HEARTBEAT: 0
FEVER: 0
WEAKNESS: 0
VOMITING: 0
ORTHOPNEA: 0
PND: 0
NIGHT SWEATS: 0
NAUSEA: 0
DYSPNEA ON EXERTION: 0
NEAR-SYNCOPE: 0
FOCAL WEAKNESS: 0
ABDOMINAL PAIN: 0
SYNCOPE: 0
COUGH: 0
SHORTNESS OF BREATH: 0

## 2025-04-11 ASSESSMENT — FIBROSIS 4 INDEX: FIB4 SCORE: 1.57

## 2025-04-11 NOTE — TELEPHONE ENCOUNTER
Fax'd medical records request to [facility] (fax #[phone]) for lab results from the previous month as requested by . Confirmation report uploaded into [employer].

## 2025-06-12 ENCOUNTER — HOSPITAL ENCOUNTER (OUTPATIENT)
Dept: LAB | Facility: MEDICAL CENTER | Age: 80
End: 2025-06-12
Attending: UROLOGY
Payer: MEDICARE

## 2025-06-12 LAB — PSA SERPL DL<=0.01 NG/ML-MCNC: 0.37 NG/ML (ref 0–4)

## 2025-06-12 PROCEDURE — 84153 ASSAY OF PSA TOTAL: CPT | Mod: GA

## 2025-06-12 PROCEDURE — 36415 COLL VENOUS BLD VENIPUNCTURE: CPT

## 2025-06-19 ENCOUNTER — OFFICE VISIT (OUTPATIENT)
Dept: NEUROLOGY | Facility: MEDICAL CENTER | Age: 80
End: 2025-06-19
Attending: STUDENT IN AN ORGANIZED HEALTH CARE EDUCATION/TRAINING PROGRAM
Payer: MEDICARE

## 2025-06-19 VITALS
HEART RATE: 67 BPM | HEIGHT: 73 IN | DIASTOLIC BLOOD PRESSURE: 62 MMHG | SYSTOLIC BLOOD PRESSURE: 128 MMHG | TEMPERATURE: 97 F | OXYGEN SATURATION: 96 % | BODY MASS INDEX: 31.09 KG/M2 | WEIGHT: 234.57 LBS

## 2025-06-19 DIAGNOSIS — R26.81 GAIT INSTABILITY: ICD-10-CM

## 2025-06-19 DIAGNOSIS — I65.21 CAROTID STENOSIS, RIGHT: ICD-10-CM

## 2025-06-19 DIAGNOSIS — C44.622 SCC (SQUAMOUS CELL CARCINOMA), ARM, RIGHT: ICD-10-CM

## 2025-06-19 DIAGNOSIS — Z86.73 HISTORY OF STROKE: ICD-10-CM

## 2025-06-19 DIAGNOSIS — R29.818 TRANSIENT NEUROLOGICAL SYMPTOMS: ICD-10-CM

## 2025-06-19 DIAGNOSIS — I48.0 PAROXYSMAL ATRIAL FIBRILLATION (HCC): ICD-10-CM

## 2025-06-19 DIAGNOSIS — R47.89 WORD FINDING DIFFICULTY: ICD-10-CM

## 2025-06-19 DIAGNOSIS — F41.9 CHRONIC ANXIETY: ICD-10-CM

## 2025-06-19 DIAGNOSIS — G40.919 BREAKTHROUGH SEIZURE (HCC): ICD-10-CM

## 2025-06-19 DIAGNOSIS — I25.5 ISCHEMIC CARDIOMYOPATHY: ICD-10-CM

## 2025-06-19 DIAGNOSIS — R56.9 SEIZURES (HCC): Primary | ICD-10-CM

## 2025-06-19 DIAGNOSIS — I10 ESSENTIAL HYPERTENSION: ICD-10-CM

## 2025-06-19 DIAGNOSIS — R40.4 TRANSIENT ALTERATION OF AWARENESS: ICD-10-CM

## 2025-06-19 DIAGNOSIS — G40.101: ICD-10-CM

## 2025-06-19 DIAGNOSIS — E78.2 MIXED HYPERLIPIDEMIA: ICD-10-CM

## 2025-06-19 DIAGNOSIS — Z91.81 RISK FOR FALLS: ICD-10-CM

## 2025-06-19 DIAGNOSIS — Z98.890 HISTORY OF CAROTID ENDARTERECTOMY: ICD-10-CM

## 2025-06-19 PROCEDURE — 99213 OFFICE O/P EST LOW 20 MIN: CPT | Performed by: STUDENT IN AN ORGANIZED HEALTH CARE EDUCATION/TRAINING PROGRAM

## 2025-06-19 PROCEDURE — 3078F DIAST BP <80 MM HG: CPT | Performed by: STUDENT IN AN ORGANIZED HEALTH CARE EDUCATION/TRAINING PROGRAM

## 2025-06-19 PROCEDURE — 3074F SYST BP LT 130 MM HG: CPT | Performed by: STUDENT IN AN ORGANIZED HEALTH CARE EDUCATION/TRAINING PROGRAM

## 2025-06-19 RX ORDER — LORAZEPAM 1 MG/1
1 TABLET ORAL
Qty: 30 TABLET | Refills: 0 | Status: SHIPPED | OUTPATIENT
Start: 2025-06-19 | End: 2025-06-25 | Stop reason: SDUPTHER

## 2025-06-19 RX ORDER — LEVETIRACETAM 750 MG/1
750 TABLET ORAL 2 TIMES DAILY
Qty: 180 TABLET | Refills: 3 | Status: SHIPPED | OUTPATIENT
Start: 2025-06-19 | End: 2025-06-25 | Stop reason: SDUPTHER

## 2025-06-19 ASSESSMENT — FIBROSIS 4 INDEX: FIB4 SCORE: 1.57

## 2025-06-19 ASSESSMENT — PATIENT HEALTH QUESTIONNAIRE - PHQ9: CLINICAL INTERPRETATION OF PHQ2 SCORE: 0

## 2025-06-19 NOTE — PROGRESS NOTES
"NEUROLOGY FOLLOW-UP - 06/19/2025     REASON FOR VISIT: Carl Blount 80 y.o. male presents today for follow-up     SUMMARY RELEVANT PAST MEDICAL HISTORY   See prior notes    COMPENDIUM OF RELEVANT WORK-UP AND TREATMENTS TO DATE:  See prior notes    INTERVAL HISTORY:  Patient returns with his wife for routine follow-up. He had a probable seizure at the end of December 2024 that was typical (unable to speak for several minutes). He recalls having hosted his family when this happened so he and his wife were under a bit more stress with hosting family. He denies having any sort of illness at the time. He reports being compliant with keppra at that time. HE continues to be compliant with his medication. Denies any side effects.    Wife does report some worsening memory where he often asks the same question, repeats himself which is new.    CURRENT MEDICATIONS:  Medications Ordered Prior to Encounter[1]     EXAM:   /62 (BP Location: Right arm, Patient Position: Sitting, BP Cuff Size: Adult)   Pulse 67   Temp 36.1 °C (97 °F) (Temporal)   Ht 1.854 m (6' 1\")   Wt 106 kg (234 lb 9.1 oz)   SpO2 96%    Wt Readings from Last 5 Encounters:   06/19/25 106 kg (234 lb 9.1 oz)   04/11/25 107 kg (234 lb 12.8 oz)   12/19/24 107 kg (234 lb 12.6 oz)   10/29/24 107 kg (235 lb)   09/05/24 107 kg (235 lb 3.7 oz)      Physical Exam:  Physical Exam  Constitutional:       General: He is awake.   Eyes:      Extraocular Movements: EOM normal. No nystagmus.   Neurological:      Mental Status: He is alert.        Neurological Exam   Neurological Exam  Mental Status  Awake and alert. Recalls 1 of 3 objects immediately. At 3 minutes recalls 3 of 3 objects. Fund of knowledge is appropriate for level of education.  Mild expressive aphasia noted. Word-finding difficulty. Decreased fluency. Only able to say 5 words that begin with letter F in 1 minute. No word substituions. Language percewption intact. Able to follow complex " multistep commands.    Cranial Nerves  CN II: Right normal visual field. Left normal visual field.  CN III, IV, VI: Extraocular movements intact bilaterally. No nystagmus. Normal saccades. Diminished smooth pursuit.   Right pupil: 3 mm. Round. Abnormal reactivity:   Left pupil: 3 mm. Round. Abnormal reactivity:  CN V: Facial sensation is normal.  CN VII: Full and symmetric facial movement.  CN XII: Tongue midline without atrophy or fasciculations.    Motor   Right pronator drift.  Mild slowness to finger tapping on right. Clumsiness, ataxic with left fingers. Mild diffuse weakness of LE.    Sensory  Light touch is normal in upper and lower extremities.  No right-sided hemispatial neglect. No left-sided hemispatial neglect. Right extinction absent: Left extinction absent:    Coordination  Right: Finger-to-nose normal. Rapid alternating movement abnormality:Left: Finger-to-nose abnormality: Rapid alternating movement abnormality:    Gait  Casual gait: Narrow stance. Unable to rise from chair without using arms.  Walks with a gonzalez. Slightly unsteady on his feet but is cautious.       ASSESSMENT, EDUCATION, AND COUNSELING:  This is a 80 y.o. male patient who presents to the neurology clinic. We had an extensive discussion about the patient's symptoms, signs, and work-up to date, if any. We discussed potential and/or definitive diagnoses, work-up, and potential treatments.     PLAN:  Medications administered in today's encounter if applicable:       If applicable, the work-up such as labs, imaging, procedures, and/or other testing, referrals, and/or recommended treatment strategies are listed below.  Orders Placed This Encounter    levetiracetam (KEPPRA) 750 MG tablet    LORazepam (ATIVAN) 1 MG Tab     Lab Frequency Next Occurrence   US-CAROTID DOPPLER BILAT Once 07/16/2025         Medication List            Accurate as of June 19, 2025 11:15 AM. If you have any questions, ask your nurse or doctor.                START  taking these medications        Instructions   LORazepam 1 MG Tabs  Commonly known as: Ativan  Started by: Dr. Charles Martin   Doctor's comments: Request 30 tabs of 1 mg to cover 30 days.  Take 1 Tablet by mouth 1 time a day as needed (seizures/auras) for up to 30 days.  Dose: 1 mg            CONTINUE taking these medications        Instructions   apixaban 5mg Tabs  Commonly known as: Eliquis   Doctor's comments: This Rx has been ordered by a pharmacist working under a collaborative practice agreement.  Take 1 Tablet by mouth 2 times a day.  Dose: 5 mg     aspirin 81 MG EC tablet   Take 81 mg by mouth every day.  Dose: 81 mg     atorvastatin 40 MG Tabs  Commonly known as: Lipitor   Take 1 Tablet by mouth every evening.  Dose: 40 mg     cyanocobalamin 1000 MCG Tabs  Commonly known as: Vitamin B12   Take 1,000 mcg by mouth every day.  Dose: 1,000 mcg     cyclobenzaprine 10 MG Tabs  Commonly known as: Flexeril   Take 10 mg by mouth 3 times a day as needed for Muscle Spasms.  Dose: 10 mg     diclofenac sodium 1 % Gel  Commonly known as: Voltaren   Apply 4 g topically 4 times a day as needed. Apply to back   Indications: Joint Damage causing Pain and Loss of Function  Dose: 4 g     finasteride 5 MG Tabs  Commonly known as: Proscar   Take 5 mg by mouth every day.  Dose: 5 mg     fish oil 1000 MG Caps capsule   Take 1,000 mg by mouth every day.  Dose: 1,000 mg     Jardiance 25 MG Tabs  Generic drug: Empagliflozin   Take 12.5 mg by mouth every day.  Dose: 12.5 mg     ketoconazole 2 % Crea  Commonly known as: Nizoral   Apply  topically.     levetiracetam 750 MG tablet  Commonly known as: Keppra   Take 1 Tablet by mouth 2 times a day for 360 days.  Dose: 750 mg     lidocaine 5 % Ptch  Commonly known as: Lidoderm   Place 1 Patch on the skin 1 time a day as needed (Pain).  Dose: 1 Patch     loratadine 10 MG Tabs  Commonly known as: Claritin   Take 10 mg by mouth every day.  Dose: 10 mg     metoprolol SR 25 MG Tb24  Commonly  known as: Toprol XL   Take 25 mg by mouth every day.  Dose: 25 mg     Mirabegron ER 50 MG Tb24   Take 50 mg by mouth every day.  Dose: 50 mg     pantoprazole 40 MG Tbec  Commonly known as: Protonix   Take 40 mg by mouth every day.  Dose: 40 mg     sacubitril-valsartan 24-26 MG Tabs  Commonly known as: Entresto   TAKE 1 TABLET BY MOUTH TWICE A DAY FOR HEART FAILURE --STOP LISINOPRIL 36 HOURS OR MORE BEFORE STARTING THIS MEDICATION     spironolactone 25 MG Tabs  Commonly known as: Aldactone   Take 1 Tablet by mouth every day.  Dose: 25 mg     tamsulosin 0.4 MG capsule  Commonly known as: Flomax   Take 0.4 mg by mouth 2 times a day.  Dose: 0.4 mg     therapeutic multivitamin-minerals Tabs   Take 1 tablet by mouth every day.  Dose: 1 Tablet     traZODone 100 MG Tabs  Commonly known as: Desyrel   Take 100 mg by mouth every evening.  Dose: 100 mg     vitamin D3 1000 Unit (25 mcg) Tabs  Commonly known as: Cholecalciferol   Take 1,000 Units by mouth every day.  Dose: 1,000 Units               Patient with 1 breakthrough seizure that he is aware of since end of December 2024. I offered to increase keppra to 1000 mg twice per day but he wished to stay at the dose he is at right now. If he changes his mind he will let me know. I told him I can send in a new prescription at a higher dose if need be. I am prescribing ativan 1 mg to be used as needed for seizures, auras, and to take pre-emotively before a plain flight. He will be going to [place] in the coming months.    I asked patient, wife to keep track of his short term memory difficulties. If symptoms progress we can consider additional work-up.        Follow-up in 6 months      BILLING DOCUMENTATION:     The number of minutes of face-to-face time spent in this encounter was I spent a total of 20 minutes on the day of the visit.  . Over 50% of the time of the visit today was spent on counseling and/or coordination of care wtih the patient and/or family, as outlined above in  assessment in plan.    Charles Martin MD  Department of Neurology at [facility]  Diplomate of the American Board of Psychiatry and Neurology, General Neurology  Diplomate of American Board of Psychiatry and Neurology, a Member Board of the American Board of Medical Subspecialties, Epilepsy  Director of [facility] Level III Comprehensive Epilepsy Program  Professor of Clinical Neurology, [facility].   [address], [address]  [facility] [address] [phone]   Fax: [phone]  E-mail: [email or web address]         [1]   Current Outpatient Medications on File Prior to Visit   Medication Sig Dispense Refill    ketoconazole (NIZORAL) 2 % Cream Apply  topically.      sacubitril-valsartan (ENTRESTO) 24-26 MG Tab TAKE 1 TABLET BY MOUTH TWICE A DAY FOR HEART FAILURE --STOP LISINOPRIL 36 HOURS OR MORE BEFORE STARTING THIS MEDICATION      Empagliflozin (JARDIANCE) 25 MG Tab Take 12.5 mg by mouth every day.      metoprolol SR (TOPROL XL) 25 MG TABLET SR 24 HR Take 25 mg by mouth every day.      spironolactone (ALDACTONE) 25 MG Tab Take 1 Tablet by mouth every day. 90 Tablet 3    Mirabegron ER 50 MG TABLET SR 24 HR Take 50 mg by mouth every day.      diclofenac sodium (VOLTAREN) 1 % Gel Apply 4 g topically 4 times a day as needed. Apply to back   Indications: Joint Damage causing Pain and Loss of Function      vitamin D3 (CHOLECALCIFEROL) 1000 Unit (25 mcg) Tab Take 1,000 Units by mouth every day.      aspirin 81 MG EC tablet Take 81 mg by mouth every day.      atorvastatin (LIPITOR) 40 MG Tab Take 1 Tablet by mouth every evening. 100 Tablet 3    apixaban (ELIQUIS) 5mg Tab Take 1 Tablet by mouth 2 times a day. 180 Tablet 1    finasteride (PROSCAR) 5 MG Tab Take 5 mg by mouth every day.      loratadine (CLARITIN) 10 MG Tab Take 10 mg by mouth every day.      lidocaine (LIDODERM) 5 % Patch Place 1 Patch on the skin 1 time a day as needed (Pain).      traZODone  (DESYREL) 100 MG Tab Take 100 mg by mouth every evening.      therapeutic multivitamin-minerals (THERAGRAN-M) Tab Take 1 tablet by mouth every day.      cyanocobalamin (VITAMIN B12) 1000 MCG Tab Take 1,000 mcg by mouth every day.      Omega-3 Fatty Acids (FISH OIL) 1000 MG Cap capsule Take 1,000 mg by mouth every day.      cyclobenzaprine (FLEXERIL) 10 MG Tab Take 10 mg by mouth 3 times a day as needed for Muscle Spasms.      pantoprazole (PROTONIX) 40 MG Tablet Delayed Response Take 40 mg by mouth every day.      tamsulosin (FLOMAX) 0.4 MG capsule Take 0.4 mg by mouth 2 times a day.       No current facility-administered medications on file prior to visit.

## 2025-06-19 NOTE — PATIENT INSTRUCTIONS
NEUROLOGY CLINIC VISIT WITH DR. MARTIN     PLEASE READ THIS ENTIRE DOCUMENT CAREFULLY AND COMPLETELY:    First and foremost, you matter to Dr. Martin and you deserve the best care.   Dr. Martin prides himself on providing the best possible care to all his patients. He strives to make each appointment meaningful, so that all your concerns are being addressed and all your neurological problems are being optimally treated. In order to achieve these goals for everyone, Dr. Martin has listed important reminders and the best ways to prepare for each appointment. Please read each item carefully. Thank you!    Due to the high volume of patients we are trying to help, your physician will not be able to respond by phone or in [employer] to your routine concerns between appointments.  This does not reflect a lack of interest or concern for you or your diagnosis.  Please bring these questions and concerns to your appointment where your physician can answer.  Please relay more pressing concerns to our office, either via [employer], or by phone; if not able to reach us please visit nearby Urgent Care Center or Emergency Department.  If any emergent medical needs, please seek emergent medical help and/or call 911.    Also, please note that we are not able to fill out paperwork that might be related to your work, utility company, disability, and/or driving, among others, in between the visits.  Please schedule a dedicated appointment to address any and all paperwork.  This is not due to lack of concern or interest for your disease-related work/administrative problems, but to make sure that we provide the best possible care and to fill out your paperwork in a correct, complete, and timely manner.  ------------------------------------------------------------------------------------------  Please let our office know if you have any changes in your seizure frequency and/characteristics.     Please keep a diary of your seizures and bring it  with you to each appointment.    Please take vitamin D3 [id number] internation units daily.     Please abstain from driving until further notice    If you are a biological female with epilepsy who is of reproductive age, who is actively breastfeeding, and/or who infants/young children:  Please take folic acid 1 mg daily. This is an over-the-counter supplement that is recommended to prevent certain developmental problems in your baby, in case you become pregnant in the future.  It is critical that you let our office know as soon as you become pregnant or plan to become pregnant.  If you are caring for a baby/young child, please make sure to be sitting on a soft surface while holding your baby/young child, so in case you have a seizure, your baby/young child is not injured due to fall.   Please let us know if, while breastfeeding, you observe that your baby is excessively sleepy and/or has other behavioral changes. Because many antiseizure medications are collected in breast milk, some nursing babies can suffer adverse medication effects.    Please note that the following might precipitate seizures:   missed doses of antiseizure medications  being sick with a fever, stress  Fatigue  sleep deprivation or abnormal sleeping patterns  not eating regularly  not drinking enough water  drinking too much alcohol  stopping alcohol suddenly if you are currently using it on a regular/daily basis,   using recreational drugs, among others.    Please note that the following might lead to an injury or even be life-threatening in the event you have a seizure and/or lose awareness while:  being in a large body of water by yourself, such as bath, pool, lake, ocean, among others (risk of drowning)  being on unprotected heights (risk of fall)  being around and/or operating heavy machinery (risk of injury)  being around open fire/hot surfaces (risk of burns)  any other activities/circumstances, in which if you lose awareness, you might  injure yourself and/or others.  -------------------------------------------------------------------------------------------  SUDEP (SUDDEN UNEXPECTED DEATH IN EPILEPSY)  It is important that your seizures are well controlled and you have none or have them rarely. In addition to avoiding injury related to breakthrough seizures, frequent seizures increase risk of SUDEP (sudden unexpected death in epilepsy), where a person goes into a seizure and then never wakes up. The best way to prevent SUDEP is to control your seizures well.   ------------------------------------------------------------------------------------------  Please call for help (crisis line and/or 911) in case you have thoughts of harming yourself and/or others.  ------------------------------------------------------------------------------------------  INSTRUCTIONS FOR YOUR FAMILY/CAREGIVERS:  Please call 911 if the patient has a seizure longer than 2-3 minutes, if seizures are back to back without her recovering to her baseline, or she does not start recovering within 5-10 minutes after the seizure stops. During the seizure - please turn her on her side, please make sure her head is protected (for example, you should put a pillow under her head, if one is available), and please do not put anything in her mouth.   ------------------------------------------------------------------------------------------  PATIENT EXPECTATIONS,  IMPORTANT APPOINTMENT REMINDERS, AND ADDITIONAL HELPFUL TIPS:   REFILLS:   Request refills AT LEAST 1 week in advance to ensure you do not run out of medications    MyChart  It is STRONGLY encouraged that ALL patients sign up for MyChart. It is BY FAR the fastest and most convenient way for both Dr. Martin and patients to obtain timely refills.  If you are having trouble signing up or logging into your account, staff are available to help you. Please ask a medical assistant or staff at the  to assist you.    TEST RESULS:    All labs and diagnostic test results will be reviewed at your next visit, UNLESS  Dr. Martin determines that there are important findings on the tests need to be acted on sooner. Dr. Martin will either call or send a message through [employer] if this is the case.    BE PREPARED PRIOR TO EVERY APPOINTMENT:  All patient are responsible for ensuring that ALL test results that were completed outside of the [facility] system have been received by our Neurology Department PRIOR to your appointment with Dr. Martin.    IMPORTANT:  ALL images (not just the reports) must be sent and uploaded to the [facility] system. Dr. Martin reviews all images personally prior to each visit. Ensuring that ALL the test results and test images are accessible to Dr. Martin prior to your appointment is YOUR responsibility and an important part of making the most out of each appointment.   Bring a government-issues picture ID and an updated insurance card EVERY visit.  It is highly recommended that you bring at every visit a list of the most important topics that you want address. While it may not be possible to address all items on the list in a single visit, preparing a list will ensure that Dr. Martin addresses the items that are most important to you and your health    PAPERWORK, DOCUMENTATION, LETTER REQUESTS:  You must notify the office ahead of your appointment of all paperwork or letter requests.   Please DO NOT wait until the last minute to make these requests. Please give all paperwork to the medical assistant at the start of the appointment and check-in process. Please note that Dr. Martin may not be able complete some types of documentation in a single appointment or even within a single day or week. This is why it is important to communicate paperwork requests prior to your appointment and at least 2 weeks prior to any deadlines.    KNOW ALL YOU MEDICATIONS:   AT EVERY SINGLE APPOINTMENT, please bring a list of every single prescribed,  non-prescribed, and over the counter medication or supplements you are taking, including ones taken on a rare or intermittent basis.  Include the following information for each prescribed or non-prescribed medications:  Name of medication   The strength of EACH pill/capsule/tablet, etc.   The number of pills/capsules/tablets, etc taken per dose  The number and time of day that doses are taken  For every single Supplement that you take on a routine or intermittent basis, you must include:  The Brand Name   A complete list of every single ingredient, compound, vitamin, and/or mineral in each dose, along with the corresponding amounts/strengths of all ingredients, vitamins, minerals, etc., if such information is provided or known  The number of doses taken per day and time of day doses are taken  If medications are taken on an intermittent or as needed basis, please estimate how many days per week or days per month the medications are used  DO NOT just print out your medication list from [employer] or bring a list from a prior appointment or hospitalizations because the information is often often unreliable, inaccurate, outdated, and/or incomplete   The list should be printed or written  If you forget or do not have a list of all the medication, then it is acceptable, although less preferred, to bring all the bottles to the appointment     ARRIVE EARLY FOR ALL VISITS:  Please note that we are unable to accommodate late arrivals as per office policy.  YOU-the patient - (NOT a parent, spouse, or friend) must be physically present at check-in no later than 12 minutes after the scheduled appointment time, or you will be asked to reschedule   Consider scheduling a virtual appointment with Dr. Martin through [employer] as an alternative if transportation to the clinic is difficult or unavailable   Please note, however, that virtual visits can only be scheduled after being an established patient of Dr. Martin. All new appointments  "must be done in-person in clinic  Some insurances will not cover the cost of virtual appointments. Please check with your insurance to find out if these visits are covered    COMMUNICATING URGENT AND NON-URGENT MATTERS:  Your concerns are important and deserve to be heard and addressed. If you have an urgent matter, there are two methods that will ensure your concerns are prioritized appropriately:   Preferred method: Sign-up/Login to your [employer] account and send a message addressed to Dr. Martin or Angela Johnson (Dr. Martin's assistant). In the subject line, type \"urgent\" followed by a word or phrase describing the situation (For example, write \"Urgent: Out of antiseuzre med and need refill\" or \"Urgent: Severe side effects to new meds\". In doing this, our staff can ensure urgent messages are triaged appropriately and communicated to Dr. Martin that day.  Call [facility] main line at [phone]. Dr. Martin's voicemail extension is [phone]. When leaving a voice message, specifically indicate if it is urgent (or non-urgent) so that the matter can be triaged appropriately and addressed in a timely manner    Thank you for entrusting your neurological care to [facility] and we look forward to continuing to serve you.   "

## 2025-06-23 ENCOUNTER — TELEPHONE (OUTPATIENT)
Dept: NEUROLOGY | Facility: MEDICAL CENTER | Age: 80
End: 2025-06-23
Payer: MEDICARE

## 2025-06-23 NOTE — TELEPHONE ENCOUNTER
Pt is having issues getting his prescriptions [facility], and would like it to be sent to the [facility] pharmacy on file.

## 2025-06-25 DIAGNOSIS — R56.9 SEIZURES (HCC): ICD-10-CM

## 2025-06-25 DIAGNOSIS — G40.101: ICD-10-CM

## 2025-06-30 RX ORDER — LORAZEPAM 1 MG/1
1 TABLET ORAL
Qty: 30 TABLET | Refills: 0 | Status: SHIPPED | OUTPATIENT
Start: 2025-06-30 | End: 2025-07-30

## 2025-06-30 RX ORDER — LEVETIRACETAM 750 MG/1
750 TABLET ORAL 2 TIMES DAILY
Qty: 180 TABLET | Refills: 3 | Status: SHIPPED | OUTPATIENT
Start: 2025-06-30 | End: 2026-06-25

## 2025-07-17 ENCOUNTER — APPOINTMENT (OUTPATIENT)
Dept: RADIOLOGY | Facility: MEDICAL CENTER | Age: 80
End: 2025-07-17
Attending: SURGERY
Payer: MEDICARE

## 2025-07-22 ENCOUNTER — HOSPITAL ENCOUNTER (OUTPATIENT)
Dept: RADIOLOGY | Facility: MEDICAL CENTER | Age: 80
End: 2025-07-22
Attending: SURGERY
Payer: MEDICARE

## 2025-07-22 DIAGNOSIS — I65.21 CAROTID STENOSIS, ASYMPTOMATIC, RIGHT: ICD-10-CM

## 2025-07-22 PROCEDURE — 93880 EXTRACRANIAL BILAT STUDY: CPT | Mod: 26 | Performed by: INTERNAL MEDICINE

## 2025-07-22 PROCEDURE — 93880 EXTRACRANIAL BILAT STUDY: CPT

## 2025-07-29 ENCOUNTER — OFFICE VISIT (OUTPATIENT)
Facility: MEDICAL CENTER | Age: 80
End: 2025-07-29
Payer: MEDICARE

## 2025-07-29 VITALS
WEIGHT: 234 LBS | OXYGEN SATURATION: 97 % | DIASTOLIC BLOOD PRESSURE: 66 MMHG | TEMPERATURE: 97.5 F | SYSTOLIC BLOOD PRESSURE: 120 MMHG | HEIGHT: 73 IN | BODY MASS INDEX: 31.01 KG/M2 | HEART RATE: 73 BPM

## 2025-07-29 DIAGNOSIS — I65.21 CAROTID STENOSIS, ASYMPTOMATIC, RIGHT: Primary | ICD-10-CM

## 2025-07-29 ASSESSMENT — FIBROSIS 4 INDEX: FIB4 SCORE: 1.57

## 2025-07-29 NOTE — PROGRESS NOTES
"                 VASCULAR SURGERY                 Clinic Progress Note  _________________________________    Vitals for Today's Visit  /66 (BP Location: Left arm, Patient Position: Sitting, BP Cuff Size: Adult)   Pulse 73   Temp 36.4 °C (97.5 °F) (Temporal)   Ht 1.854 m (6' 1\")   Wt 106 kg (234 lb)   SpO2 97%   BMI 30.87 kg/m²   _________________________________          7/29/2025  Patient presents for routine carotid artery surveillance visit.  He has been feeling well.  No complaints.  He has a history of a left carotid endarterectomy from many years ago.  Updated testing shows a moderate right carotid stenosis with a PSV of 229, this is a significant jump from testing 1 year ago when the PSV was only 65.  No associated symptoms.  The left carotid artery shows no significant stenosis on the current scan, the endarterectomy site is widely patent.  At this point I am recommending a CTA to further evaluate the right carotid artery.  If the plaque to progress this much there is a possibility that he could have an abnormal plaque or maybe even an unstable plaque and we should be able to identify that on the CT angiogram.    Patient has been instructed to get updated blood work and then his CT scan and then he will call Ally to let her know when the CT scan is done and I will be able to review the results with him over the phone.    If the CT scan does not show significant carotid concerns then the patient will get another surveillance duplex in a year.    If the CT scan does look concerning then we will need to discuss a right carotid endarterectomy      Baudilio Turpin MD  [facility]  [phone]  [address], [place] [address]  "

## 2025-08-11 ENCOUNTER — APPOINTMENT (OUTPATIENT)
Dept: LAB | Facility: MEDICAL CENTER | Age: 80
End: 2025-08-11
Payer: MEDICARE

## 2025-08-18 ENCOUNTER — HOSPITAL ENCOUNTER (OUTPATIENT)
Dept: RADIOLOGY | Facility: MEDICAL CENTER | Age: 80
End: 2025-08-18
Attending: SURGERY
Payer: MEDICARE

## 2025-08-18 DIAGNOSIS — I65.21 CAROTID STENOSIS, ASYMPTOMATIC, RIGHT: ICD-10-CM

## 2025-08-18 PROCEDURE — 700117 HCHG RX CONTRAST REV CODE 255: Performed by: SURGERY

## 2025-08-18 PROCEDURE — 70498 CT ANGIOGRAPHY NECK: CPT

## 2025-08-18 RX ADMIN — IOHEXOL 100 ML: 350 INJECTION, SOLUTION INTRAVENOUS at 15:02

## 2025-08-19 ENCOUNTER — TELEPHONE (OUTPATIENT)
Dept: CARDIOLOGY | Facility: MEDICAL CENTER | Age: 80
End: 2025-08-19
Payer: MEDICARE

## 2025-08-19 ENCOUNTER — TELEPHONE (OUTPATIENT)
Facility: MEDICAL CENTER | Age: 80
End: 2025-08-19
Payer: MEDICARE

## 2025-08-19 DIAGNOSIS — E78.2 MIXED HYPERLIPIDEMIA: Primary | ICD-10-CM

## 2025-08-21 ENCOUNTER — OFFICE VISIT (OUTPATIENT)
Facility: MEDICAL CENTER | Age: 80
End: 2025-08-21
Payer: MEDICARE

## 2025-08-21 DIAGNOSIS — I65.21 CAROTID STENOSIS, ASYMPTOMATIC, RIGHT: Primary | ICD-10-CM

## 2025-08-21 PROCEDURE — 99212 OFFICE O/P EST SF 10 MIN: CPT | Performed by: SURGERY

## 2025-08-22 ENCOUNTER — HOSPITAL ENCOUNTER (OUTPATIENT)
Dept: CARDIOLOGY | Facility: MEDICAL CENTER | Age: 80
End: 2025-08-22
Attending: STUDENT IN AN ORGANIZED HEALTH CARE EDUCATION/TRAINING PROGRAM
Payer: MEDICARE

## 2025-08-22 DIAGNOSIS — I50.20 HFREF (HEART FAILURE WITH REDUCED EJECTION FRACTION) (HCC): ICD-10-CM

## 2025-08-22 DIAGNOSIS — I25.5 ISCHEMIC CARDIOMYOPATHY: ICD-10-CM

## 2025-08-22 DIAGNOSIS — I50.9 CHF (NYHA CLASS II, ACC/AHA STAGE C) (HCC): ICD-10-CM

## 2025-08-22 PROCEDURE — 700117 HCHG RX CONTRAST REV CODE 255: Performed by: STUDENT IN AN ORGANIZED HEALTH CARE EDUCATION/TRAINING PROGRAM

## 2025-08-22 PROCEDURE — 93306 TTE W/DOPPLER COMPLETE: CPT

## 2025-08-22 RX ADMIN — HUMAN ALBUMIN MICROSPHERES AND PERFLUTREN 3 ML: 10; .22 INJECTION, SOLUTION INTRAVENOUS at 13:15

## 2025-08-24 PROCEDURE — 93306 TTE W/DOPPLER COMPLETE: CPT | Mod: 26 | Performed by: STUDENT IN AN ORGANIZED HEALTH CARE EDUCATION/TRAINING PROGRAM
